# Patient Record
Sex: MALE | Race: BLACK OR AFRICAN AMERICAN | NOT HISPANIC OR LATINO | Employment: UNEMPLOYED | URBAN - METROPOLITAN AREA
[De-identification: names, ages, dates, MRNs, and addresses within clinical notes are randomized per-mention and may not be internally consistent; named-entity substitution may affect disease eponyms.]

---

## 2017-02-08 ENCOUNTER — HOSPITAL ENCOUNTER (OUTPATIENT)
Facility: OTHER | Age: 52
Discharge: HOME | End: 2017-02-08
Attending: INTERNAL MEDICINE | Admitting: INTERNAL MEDICINE
Payer: MEDICAID

## 2017-02-08 ENCOUNTER — SURGERY (OUTPATIENT)
Age: 52
End: 2017-02-08

## 2017-02-08 VITALS
HEART RATE: 71 BPM | OXYGEN SATURATION: 100 % | DIASTOLIC BLOOD PRESSURE: 107 MMHG | HEIGHT: 66 IN | SYSTOLIC BLOOD PRESSURE: 189 MMHG | TEMPERATURE: 98 F | RESPIRATION RATE: 20 BRPM | BODY MASS INDEX: 23.3 KG/M2 | WEIGHT: 145 LBS

## 2017-02-08 DIAGNOSIS — T82.49XA COMPLICATIONS, DIALYSIS, CATHETER, MECHANICAL, INITIAL ENCOUNTER: ICD-10-CM

## 2017-02-08 DIAGNOSIS — T82.49XA: ICD-10-CM

## 2017-02-08 PROCEDURE — 25000003 PHARM REV CODE 250

## 2017-02-08 PROCEDURE — C1752 CATH,HEMODIALYSIS,SHORT-TERM: HCPCS | Performed by: INTERNAL MEDICINE

## 2017-02-08 PROCEDURE — C1769 GUIDE WIRE: HCPCS | Performed by: INTERNAL MEDICINE

## 2017-02-08 PROCEDURE — 36581 REPLACE TUNNELED CV CATH: CPT | Performed by: INTERNAL MEDICINE

## 2017-02-08 PROCEDURE — 25000003 PHARM REV CODE 250: Performed by: INTERNAL MEDICINE

## 2017-02-08 PROCEDURE — 25500020 PHARM REV CODE 255: Performed by: INTERNAL MEDICINE

## 2017-02-08 PROCEDURE — 77001 FLUOROGUIDE FOR VEIN DEVICE: CPT | Performed by: INTERNAL MEDICINE

## 2017-02-08 PROCEDURE — 25500020 PHARM REV CODE 255

## 2017-02-08 RX ORDER — HEPARIN SODIUM 1000 [USP'U]/ML
INJECTION, SOLUTION INTRAVENOUS; SUBCUTANEOUS
Status: DISCONTINUED | OUTPATIENT
Start: 2017-02-08 | End: 2017-02-08 | Stop reason: HOSPADM

## 2017-02-08 RX ORDER — HEPARIN SOD,PORCINE/0.9 % NACL 1000/500ML
INTRAVENOUS SOLUTION INTRAVENOUS
Status: DISCONTINUED | OUTPATIENT
Start: 2017-02-08 | End: 2017-02-08 | Stop reason: HOSPADM

## 2017-02-08 RX ORDER — LIDOCAINE HYDROCHLORIDE 10 MG/ML
INJECTION INFILTRATION; PERINEURAL
Status: DISCONTINUED | OUTPATIENT
Start: 2017-02-08 | End: 2017-02-08 | Stop reason: HOSPADM

## 2017-02-08 RX ADMIN — HEPARIN SODIUM 4000 UNITS: 1000 INJECTION, SOLUTION INTRAVENOUS; SUBCUTANEOUS at 10:02

## 2017-02-08 RX ADMIN — IOHEXOL 4 ML: 300 INJECTION, SOLUTION INTRAVENOUS at 10:02

## 2017-02-08 RX ADMIN — LIDOCAINE HYDROCHLORIDE 10 ML: 10 INJECTION, SOLUTION INFILTRATION; PERINEURAL at 10:02

## 2017-02-08 RX ADMIN — HEPARIN SODIUM IN SODIUM CHLORIDE 500 ML: 200 INJECTION INTRAVENOUS at 09:02

## 2017-02-08 NOTE — BRIEF OP NOTE
[facility]  Brief Operative Note     SUMMARY     Surgery Date: 2/8/2017     Surgeon(s) and Role:     * Ady Chung MD - Primary    Assisting Surgeon: None    Pre-op Diagnosis:  ESRD (end stage renal disease) on dialysis [N18.6, Z99.2]  Mechanical complication of arteriovenous fistula surgically created [T82.590A]    Post-op Diagnosis:  Post-Op Diagnosis Codes:     * ESRD (end stage renal disease) on dialysis [N18.6, Z99.2]     * Mechanical complication of arteriovenous fistula surgically created [T82.590A]    Procedure(s) (LRB):  PERMCATH REWIRE- TUNNELED CATH REWIRE (Right)  ULTRASOUND (N/A)    Anesthesia: Local    Description of the findings of the procedure: TDC rewire    Findings/Key Components: patent SVC; new 23cm palindrome placed with tip right atrium    Estimated Blood Loss: * No values recorded between 2/8/2017 12:00 AM and 2/8/2017 10:39 AM *         Specimens:   Specimen     None          Discharge Note    SUMMARY     Admit Date: 2/8/2017    Discharge Date and Time:  02/08/2017 10:44 AM    Hospital Course (synopsis of major diagnoses, care, treatment, and services provided during the course of the hospital stay): patient admitted for TDC rewire; procedure successful no complications     Final Diagnosis: Post-Op Diagnosis Codes:     * ESRD (end stage renal disease) on dialysis [N18.6, Z99.2]     * Mechanical complication of arteriovenous fistula surgically created [T82.590A]    Disposition: Home or Self Care    Follow Up/Patient Instructions:     Medications:  Reconciled Home Medications:   Current Discharge Medication List      CONTINUE these medications which have NOT CHANGED    Details   amlodipine (NORVASC) 10 MG tablet Take 1 tablet (10 mg total) by mouth once daily.  Qty: 30 tablet, Refills: 0      levetiracetam (KEPPRA) 250 MG Tab Take 2 tablets (500 mg total) by mouth 2 (two) times daily.  Qty: 60 tablet, Refills: 0      sevelamer carbonate (RENVELA) 800 mg Tab Take 2  tablets (1,600 mg total) by mouth 3 (three) times daily with meals.  Qty: 90 tablet, Refills: 0             Discharge Procedure Orders  Activity as tolerated       Follow-up Information     Follow up with LOREN LINDER In 1 day.    Why:  As needed    Contact information:    Benny Lindo [address] [address]  [phone]        Follow up with Nydia Linder.    Specialty:  Dialysis Center    Contact information:    Benny LINDO [facility]  [phone]            Resume previous renal diet

## 2017-02-08 NOTE — H&P
[facility]  History & Physical    Subjective:      Chief Complaint/Reason for Admission: TDC exchange    Greyson Hudson III is a 51 y.o. male with ESRD who presents for above.  He states his current TDC was placed about 1 year ago in [place]; recently unable to achive sufficient BFR.  He has a right upper arm AVF that was placed about that time, but has never been used.  His dialysis facility is requesting ultrasound of AVF as well.    Past Medical History   Diagnosis Date    Hypertension     Kidney failure     Seizure disorder 2016     Past Surgical History   Procedure Laterality Date    Fistula creation Right 01/2016     History reviewed. No pertinent family history.  Social History   Substance Use Topics    Smoking status: Never Smoker    Smokeless tobacco: None    Alcohol use None       PTA Medications   Medication Sig    amlodipine (NORVASC) 10 MG tablet Take 1 tablet (10 mg total) by mouth once daily.    levetiracetam (KEPPRA) 250 MG Tab Take 2 tablets (500 mg total) by mouth 2 (two) times daily.    sevelamer carbonate (RENVELA) 800 mg Tab Take 2 tablets (1,600 mg total) by mouth 3 (three) times daily with meals.     Review of patient's allergies indicates:   Allergen Reactions    Pcn [penicillins] Itching        Review of Systems   Constitutional: Negative.    Respiratory: Negative.    Cardiovascular: Negative.    All other systems reviewed and are negative.      Objective:      Vital Signs (Most Recent)  Temp: 98.2 °F (36.8 °C) (02/08/17 0947)  Pulse: 69 (02/08/17 0947)  Resp: 18 (02/08/17 0947)  BP: (!) 201/98 (02/08/17 0947)  SpO2: 100 % (02/08/17 0947)    Vital Signs Range (Last 24H):  Temp:  [98.2 °F (36.8 °C)]   Pulse:  [69]   Resp:  [18]   BP: (201)/(98)   SpO2:  [100 %]     Physical Exam   Constitutional: He appears well-developed and well-nourished. No distress.   HENT:   Head: Normocephalic and atraumatic.   Cardiovascular: Normal rate and regular rhythm.     Pulmonary/Chest: No respiratory distress.   Musculoskeletal:   Right upper arm brachiocephalic AVF appears well developed; soft thrill   Neurological: He is alert.   Skin: He is not diaphoretic.           Assessment:    Poorly functioning TDC    Plan:    TDC rewire; Ultrasound to assess maturity of right upper extremity AVF

## 2017-02-08 NOTE — PROCEDURES
[facility] Interventional Nephrology Service    Date Of Procedure: 2/8/17    Procedure: Tunneled Dialysis Central Catheter Exchange    Indication: poor bfr via TDC    Primary Surgeon: Ady Chung MD  Assist: none    Referring Provider: Dr. Bazan    Blood Loss: 10cc  Contrast Used: 10cc    Procedure in Detail:  Patient prepped and draped in usual sterile fashion.  2% lidocaine with epinephrine used for local sedation to anesthetize soft tissues.  Blunt hemostat was used to dissect exit site and fibrin sheath around catheter and its cuff.  Catheter location right IJ.  Once the catheter and the cuff were freed, a guidewire was inserted through distal port into the central venous circulation (CVC).  This was done under fluoroscopic guidance.  Once wire was positioned to inferior vena cava, the old catheter was pulled while maintaining hemostasis as well as position of the wire in the CVC under fluoroscopy.  Before pulling catheter, 10cc of contrast was injected to evaluate for a fibrin sheath.  No sheath was noted. A new 23cm catheter was then placed over the guidewire and confirmed in correct positioning under fluoroscopy.  Both ports flushed well.  The catheter was packed with 1000 units/mL heparin.  Suture was used to secure the catheter at the exit site and to anchor to patient's skin.      A total of 3 sutures were placed    Patient tolerated the procedure well and there were no complications      Ultrasound of AVF:  - patent arterial anastamosis  - mid AVF approximately 8mm diameter; one large accessory vein noted  - AVF outflow with narrowing to approximately 5mm, but no pulsatility or critical stenosis  - BFV approximately 1250 ml/min      Impression/Plan:  - Successful rewire of TDC.  In addition to this, AVF appears mature for cannulation and recommend attempt at cannulation.  If unable to successfully cannulate for dialysis send for fistulogram.

## 2017-02-08 NOTE — IP AVS SNAPSHOT
[facility] Location ([place])  [address] [place] [address]  Phone: [phone]           Patient Discharge Instructions     Our goal is to set you up for success. This packet includes information on your condition, medications, and your home care. It will help you to care for yourself so you don't get sicker and need to go back to the hospital.     Please ask your nurse if you have any questions.        There are many details to remember when preparing to leave the hospital. Here is what you will need to do:    1. Take your medicine. If you are prescribed medications, review your Medication List in the following pages. You may have new medications to  at the pharmacy and others that you'll need to stop taking. Review the instructions for how and when to take your medications. Talk with your doctor or nurses if you are unsure of what to do.     2. Go to your follow-up appointments. Specific follow-up information is listed in the following pages. Your may be contacted by a transition nurse or clinical provider about future appointments. Be sure we have all of the phone numbers to reach you, if needed. Please contact your provider's office if you are unable to make an appointment.     3. Watch for warning signs. Your doctor or nurse will give you detailed warning signs to watch for and when to call for assistance. These instructions may also include educational information about your condition. If you experience any of warning signs to your health, call your doctor.               ** Verify the list of medication(s) below is accurate and up to date. Carry this with you in case of emergency. If your medications have changed, please notify your healthcare provider.             Medication List      ASK your doctor about these medications        Additional Info                      amlodipine 10 MG tablet   Commonly known as:  NORVASC   Quantity:  30 tablet   Refills:  0   Dose:  10 mg     "Instructions:  Take 1 tablet (10 mg total) by mouth once daily.     Begin Date    AM    Noon    PM    Bedtime       levetiracetam 250 MG Tab   Commonly known as:  KEPPRA   Quantity:  60 tablet   Refills:  0   Dose:  500 mg    Instructions:  Take 2 tablets (500 mg total) by mouth 2 (two) times daily.     Begin Date    AM    Noon    PM    Bedtime       sevelamer carbonate 800 mg Tab   Commonly known as:  RENVELA   Quantity:  90 tablet   Refills:  0   Dose:  1600 mg    Instructions:  Take 2 tablets (1,600 mg total) by mouth 3 (three) times daily with meals.     Begin Date    AM    Noon    PM    Bedtime                  Please bring to all follow up appointments:    1. A copy of your discharge instructions.  2. All medicines you are currently taking in their original bottles.  3. Identification and insurance card.    Please arrive 15 minutes ahead of scheduled appointment time.    Please call 24 hours in advance if you must reschedule your appointment and/or time.        Follow-up Information     Follow up with [facility] In 1 day.    Why:  As needed    Contact information:    [address] [place] [address]  [phone]        Discharge References/Attachments     CENTRAL VEIN ACCESS, CARING FOR YOUR (ENGLISH)    CENTRAL LINE (CENTRAL VENOUS ACCESS DEVICE) (ENGLISH)        Admission Information     Date & Time Provider Department CSN    2/8/2017  9:01 AM Ady Chung MD [facility] [id number]      Care Providers     Provider Role Specialty Primary office phone    Ady Chung MD Attending Provider Nephrology [phone]    Ady Chung MD Surgeon  Nephrology [phone]      Your Vitals Were     BP Pulse Temp Resp Height Weight    178/101 71 98.2 °F (36.8 °C) (Oral) 18 5' 6" (1.676 m) 65.8 kg (145 lb)    SpO2 BMI             100% 23.4 kg/m2         Recent Lab Values     No lab values to display.      Allergies as of 2/8/2017        Reactions    Pcn " [Penicillins] Itching      [facility]     [facility] Nurse Care Line - 24/7 Assistance  Unless otherwise directed by your provider, please contact [facility], our nurse care line that is available for 24/7 assistance.     Registered nurses in the [facility] provide clinical advisement, health education, appointment booking, and other advisory services.  Call for this free service at [phone].        Advance Directives     An advance directive is a document which, in the event you are no longer able to make decisions for yourself, tells your healthcare team what kind of treatment you do or do not want to receive, or who you would like to make those decisions for you.  If you do not currently have an advance directive, [facility] encourages you to create one.  For more information call:  [phone]), [phone] ([phone]),  or log on to [email or web address].        Language Assistance Services     ATTENTION: Language assistance services are available, free of charge. Please call [phone].      ATENCIÓN: Si habla español, tiene a lloyd disposición servicios gratuitos de asistencia lingüística. Llame al [phone].     CHÚ Ý: N?u b?n nói Ti?ng Vi?t, có các d?ch v? h? tr? ngôn ng? mi?n phí dành cho b?n. G?i s? [phone].        Chronic Kindey Disease Education             [facility] Sign-Up     Activating your [facility] account is as easy as 1-2-3!     1) Visit [email or web address], select Sign Up Now, enter this activation code and your date of birth, then select Next.  [email or web address]  Expires: 3/25/2017 10:39 AM      2) Create a username and password to use when you visit [facility] in the future and select a security question in case you lose your password and select Next.    3) Enter your e-mail address and click Sign Up!    Additional Information  If you have questions, please e-mail [email or web address] or call [phone] to talk to our [facility]  staff. Remember, [facility] is NOT to be used for urgent needs. For medical emergencies, dial 911.          [facility] complies with applicable Federal civil rights laws and does not discriminate on the basis of race, color, national origin, age, disability, or sex.

## 2017-02-11 ENCOUNTER — HOSPITAL ENCOUNTER (EMERGENCY)
Facility: OTHER | Age: 52
Discharge: HOME | End: 2017-02-11
Attending: EMERGENCY MEDICINE
Payer: MEDICAID

## 2017-02-11 VITALS
BODY MASS INDEX: 23.3 KG/M2 | HEIGHT: 66 IN | HEART RATE: 86 BPM | TEMPERATURE: 99 F | OXYGEN SATURATION: 100 % | DIASTOLIC BLOOD PRESSURE: 89 MMHG | WEIGHT: 145 LBS | SYSTOLIC BLOOD PRESSURE: 165 MMHG | RESPIRATION RATE: 14 BRPM

## 2017-02-11 DIAGNOSIS — T82.49XA COMPLICATIONS, DIALYSIS, CATHETER, MECHANICAL, INITIAL ENCOUNTER: Primary | ICD-10-CM

## 2017-02-11 LAB
ANION GAP SERPL CALC-SCNC: 10 MMOL/L
BUN SERPL-MCNC: 61 MG/DL
CALCIUM SERPL-MCNC: 8.5 MG/DL
CHLORIDE SERPL-SCNC: 107 MMOL/L
CO2 SERPL-SCNC: 22 MMOL/L
CREAT SERPL-MCNC: 13.2 MG/DL
EST. GFR  (AFRICAN AMERICAN): 4 ML/MIN/1.73 M^2
EST. GFR  (NON AFRICAN AMERICAN): 4 ML/MIN/1.73 M^2
GLUCOSE SERPL-MCNC: 93 MG/DL
POTASSIUM SERPL-SCNC: 4.1 MMOL/L
SODIUM SERPL-SCNC: 139 MMOL/L

## 2017-02-11 PROCEDURE — 80048 BASIC METABOLIC PNL TOTAL CA: CPT

## 2017-02-11 PROCEDURE — 99285 EMERGENCY DEPT VISIT HI MDM: CPT

## 2017-02-11 RX ORDER — HYDROCODONE BITARTRATE AND ACETAMINOPHEN 5; 325 MG/1; MG/1
1 TABLET ORAL EVERY 4 HOURS PRN
Qty: 4 TABLET | Refills: 0 | Status: ON HOLD | OUTPATIENT
Start: 2017-02-11 | End: 2018-11-01 | Stop reason: HOSPADM

## 2017-02-11 NOTE — ED AVS SNAPSHOT
[facility]  [address]  [facility] [address]               Greyson Hudson III   2017  5:12 PM   ED    Description:  Male : 1965   Department:  [facility]           Your Care was Coordinated By:     Provider Role From To    Nubia Pichardo MD Attending Provider [phone] --      Reason for Visit     Dialysis Port Problem           Diagnoses this Visit        Comments    Complications, dialysis, catheter, mechanical, initial encounter    -  Primary       ED Disposition     None           To Do List           Follow-up Information     Follow up with Primary Care Physician  .       These Medications        Disp Refills Start End    hydrocodone-acetaminophen 5-325mg (NORCO) 5-325 mg per tablet 4 tablet 0 2017     Take 1 tablet by mouth every 4 (four) hours as needed for Pain. - Oral    Pharmacy: [facility] [address] [place] [address] Ph #: [phone]         [facility]     [facility] Care [facility] -  Assistance  Registered nurses in the [facility] provide clinical advisement, health education, appointment booking, and other advisory services.  Call for this free service at [phone].             Medications           Message regarding Medications     Verify the changes and/or additions to your medication regime listed below are the same as discussed with your clinician today.  If any of these changes or additions are incorrect, please notify your healthcare provider.        START taking these NEW medications        Refills    hydrocodone-acetaminophen 5-325mg (NORCO) 5-325 mg per tablet 0    Sig: Take 1 tablet by mouth every 4 (four) hours as needed for Pain.    Class: Print    Route: Oral           Verify that the below list of medications is an accurate representation of the medications you are currently taking.  If none reported, the  "list may be blank. If incorrect, please contact your healthcare provider. Carry this list with you in case of emergency.           Current Medications     amlodipine (NORVASC) 10 MG tablet Take 1 tablet (10 mg total) by mouth once daily.    levetiracetam (KEPPRA) 250 MG Tab Take 2 tablets (500 mg total) by mouth 2 (two) times daily.    sevelamer carbonate (RENVELA) 800 mg Tab Take 2 tablets (1,600 mg total) by mouth 3 (three) times daily with meals.    hydrocodone-acetaminophen 5-325mg (NORCO) 5-325 mg per tablet Take 1 tablet by mouth every 4 (four) hours as needed for Pain.           Clinical Reference Information           Your Vitals Were     BP Pulse Temp Resp Height Weight    185/103 (BP Location: Left arm, Patient Position: Sitting, BP Method: Automatic) 85 98.8 °F (37.1 °C) (Oral) 14 5' 6" (1.676 m) 65.8 kg (145 lb)    SpO2 BMI             100% 23.4 kg/m2         Allergies as of 2/11/2017        Reactions    Pcn [Penicillins] Itching      Immunizations Administered on Date of Encounter - 2/11/2017     None      ED Micro, Lab, POCT     Start Ordered       Status Ordering Provider    02/11/17 1749 02/11/17 1748  Basic metabolic panel  STAT      Final result       ED Imaging Orders     Start Ordered       Status Ordering Provider    02/11/17 1749 02/11/17 1748  X-Ray Chest 1 View  1 time imaging      Final result         Discharge Instructions       We have prescribed you pain medication. Please fill and take as directed. Do not drink alcohol or drive while taking this medication.  Do not take any additional tylenol while taking this medication.  It is important to take stool softeners and/or eat plenty of fiber to prevent constipation while taking this medication.      Please return to the ER if you have chest pain, difficulty breathing, fevers, altered mental status, dizziness, weakness, or any other concerns.      Follow up with your primary care physician.        Discharge References/Attachments     CENTRAL " LINE (CENTRAL VENOUS ACCESS DEVICE) (ENGLISH)      [facility] Sign-Up     Activating your [facility] account is as easy as 1-2-3!     1) Visit [email or web address], select Sign Up Now, enter this activation code and your date of birth, then select Next.  [email or web address]  Expires: 3/25/2017 10:39 AM      2) Create a username and password to use when you visit [facility] in the future and select a security question in case you lose your password and select Next.    3) Enter your e-mail address and click Sign Up!    Additional Information  If you have questions, please e-mail [email or web address] or call [phone] to talk to our [facility] staff. Remember, [facility] is NOT to be used for urgent needs. For medical emergencies, dial 911.          [facility] complies with applicable Federal civil rights laws and does not discriminate on the basis of race, color, national origin, age, disability, or sex.        Language Assistance Services     ATTENTION: Language assistance services are available, free of charge. Please call [phone].      ATENCIÓN: Si habla español, tiene a lloyd disposición servicios gratuitos de asistencia lingüística. Llame al [phone].     CHÚ Ý: N?u b?n nói Ti?ng Vi?t, có các d?ch v? h? tr? ngôn ng? mi?n phí dành cho b?n. G?i s? [phone].

## 2017-02-11 NOTE — ED PROVIDER NOTES
Encounter Date: 2/11/2017    SCRIBE #1 NOTE: I, Tracy Early, am scribing for, and in the presence of,  Dr. Brooks I have scribed the entire note.       History     Chief Complaint   Patient presents with    Dialysis Port Problem     pt to ED with complaint of pain to port sight onset at dialysis.  Had port placed to right chest last week started using today approx 1 1/2 hours in started having pain     Review of patient's allergies indicates:   Allergen Reactions    Pcn [penicillins] Itching     HPI Comments: Time seen by provider: 5:44 PM    This is a 51 y.o. male who presents with complaint of dialysis port pain. He reports onset of symptoms was just prior to arrival in the ED. The patient states he recently had a new catheter placed 3 days ago to the right chest. He reports he also has a fistula to the right arm. The patient is uncertain which site was accessed for dialysis. Today was first use of the new port. He reports he was only able to have about 1.5 hr of dialysis prior to being sent to the ED for evaluation of chest discomfort. The patient denies any chest pain, SOB, palpitations, nausea, vomiting, fever or chills.     The history is provided by the patient.     Past Medical History   Diagnosis Date    Hypertension     Kidney failure     Seizure disorder 2016     No past medical history pertinent negatives.  Past Surgical History   Procedure Laterality Date    Fistula creation Right 01/2016     History reviewed. No pertinent family history.  Social History   Substance Use Topics    Smoking status: Never Smoker    Smokeless tobacco: None    Alcohol use None     Review of Systems   Constitutional: Negative for chills and fever.   Respiratory: Negative for cough and shortness of breath.    Cardiovascular: Negative for chest pain and palpitations.   Gastrointestinal: Negative for nausea and vomiting.   Skin: Negative for rash and wound.        Dialysis port site tenderness   Neurological: Negative  for dizziness and headaches.       Physical Exam   Initial Vitals   BP Pulse Resp Temp SpO2   02/11/17 1721 02/11/17 1721 02/11/17 1721 02/11/17 1721 02/11/17 1721   185/103 85 14 98.8 °F (37.1 °C) 100 %     Physical Exam    Nursing note and vitals reviewed.  Constitutional: He appears well-developed and well-nourished. He is not diaphoretic. No distress.   HENT:   Head: Normocephalic and atraumatic.   Right Ear: External ear normal.   Left Ear: External ear normal.   Eyes: Conjunctivae and EOM are normal.   Neck: Normal range of motion. Neck supple.   Cardiovascular: Normal rate, regular rhythm and normal heart sounds.   Pulmonary/Chest: He has no wheezes. He has no rhonchi. He has no rales.   Right upper chest catheter that is clean dry and intact.  No surrounding erythema.     Musculoskeletal: Normal range of motion. He exhibits edema. He exhibits no tenderness.   Right upper extremity AV fistula with palpable thrill. 1+ pitting edema BLE.    Lymphadenopathy:     He has no cervical adenopathy.   Neurological: He is alert and oriented to person, place, and time. He has normal strength.   Skin: Skin is warm and dry. No rash noted.         ED Course   Procedures  Labs Reviewed   BASIC METABOLIC PANEL - Abnormal; Notable for the following:        Result Value    CO2 22 (*)     BUN, Bld 61 (*)     Creatinine 13.2 (*)     Calcium 8.5 (*)     eGFR if  4 (*)     eGFR if non  4 (*)     All other components within normal limits        Imaging Results         X-Ray Chest 1 View (Final result) Result time:  02/11/17 18:06:51    Final result by Flower Rosen MD (02/11/17 18:06:51)    Impression:        As above.      Electronically signed by: FLOWER ROSEN MD, MD  Date:     02/11/17  Time:    18:06     Narrative:    COMPARISON: Chest radiograph 10/30/16    FINDINGS: AP portable upright view of the chest. Monitoring leads overlie the chest. The cardiomediastinal silhouette is stable without  evidence of failure.  Right IJ CVC tip appears somewhat lower in position projected over the right atrium.  No new focal opacity. No large pneumothorax or pleural effusion. Osseous structures appear intact.              X-Rays:   Independently Interpreted Readings:   Chest X-Ray: Portable Chest X-ray Reading (6:04 PM): Right catheter in place. Large heart. No effusion, infiltrate or edema.       Medical Decision Making:   History:   Old Medical Records: I decided to obtain old medical records.  Initial Assessment:   5:44PM:  Pt is a 52 y/o M who presents to ED for evaluation of the R upper chest catheter. Pt appears well, nontoxic.  His AV fistula seems patent.  He had a previous catheter but was replaced over a wire earlier this week.  Will plan for xray, labs, will continue to follow and reassess.    Independently Interpreted Test(s):   I have ordered and independently interpreted X-rays - see prior notes.  Clinical Tests:   Lab Tests: Ordered and Reviewed  Radiological Study: Ordered and Reviewed  Other:   I have discussed this case with another health care provider.    Additional MDM:   X-Rays: I have independently interpreted X-Ray(s) - see notes.     5:48 PM Called [facility] dialysis center    5:52 PM Staff at the dialysis center noted that both AV fistula and cath were access today. Staff report the patient typically has a dry weight of 61 kg and was 61.6 kg today. 0. 2 kg was removed today during 1.5 hrs. Patient was supposed to have a 4 hr session    7:45 PM Pt's potassium is stable.  CXR does not indicate significant fluid overload.  Will plan to page Dr. Chung of [facility]    7:47 PM Case discussed with Dr. Chung, states the patient can have expected pain at the site after catheter change and can be discharged home to follow up in clinic    7:55PM:  I updated pt regarding results and my conversation with Dr. Chung.  I have discussed with the pt ED return warnings and need for close PCP f/u.  Pt agreeable  to plan and all questions answered.  I feel that pt is stable for discharge and management as an outpatient and no further intervention is needed at this time.  Pt is comfortable returning to the ED if needed.  Will DC home in stable condition.             Scribe Attestation:   Scribe #1: I performed the above scribed service and the documentation accurately describes the services I performed. I attest to the accuracy of the note.    Attending Attestation:           Physician Attestation for Scribe:  Physician Attestation Statement for Scribe #1: I, Dr. Pichardo, reviewed documentation, as scribed by Tracy Early in my presence, and it is both accurate and complete.                 ED Course     Clinical Impression:     1. Complications, dialysis, catheter, mechanical, initial encounter                Nubia Pichardo MD  02/11/17 6444

## 2017-02-12 NOTE — ED NOTES
Patient moved to ED room 1 via EMS, patient assisted onto stretcher and changed into a gown. Patient placed on cardiac monitor, continuous pulse oximetry and automatic blood pressure cuff. Bed placed in low locked position, side rails up x 2, call light is within reach of patient or family, orientation to room and explanation of wait provided to family and patient, alarms set and turned on for monitor and pulse ox, awaiting MD evaluation and orders, will continue to monitor.

## 2017-02-12 NOTE — DISCHARGE INSTRUCTIONS
We have prescribed you pain medication. Please fill and take as directed. Do not drink alcohol or drive while taking this medication.  Do not take any additional tylenol while taking this medication.  It is important to take stool softeners and/or eat plenty of fiber to prevent constipation while taking this medication.      Please return to the ER if you have chest pain, difficulty breathing, fevers, altered mental status, dizziness, weakness, or any other concerns.      Follow up with your primary care physician.

## 2017-02-12 NOTE — ED TRIAGE NOTES
"Pt presents to ER w/ reports of new onset of pain to dialysis access to right chest wall. Pt reports access placed on Thursday and started hurting while at dialysis today. Pt states," Its like a stabbing pain every now and then but it just feels really uncomfortable". Pt denies chest pain, SOB, drainage or bleeding to site.   "

## 2017-02-12 NOTE — ED NOTES
Attempted x 1 for blood work pt states he does not want blood work. Dr. Pichardo aware. Pt aware need for blood work, Phlebotomy called for collection.

## 2017-02-12 NOTE — ED NOTES
Patient identifiers verified and correct for Greyson Tristan III.  Pt denies chest pain or SOB at this time.   LOC: The patient is awake, alert and aware of environment with an appropriate affect, the patient is oriented x 3 and speaking appropriately.  APPEARANCE: Patient resting comfortably and in no acute distress, patient is clean and well groomed, patient's clothing is properly fastened.  SKIN: The skin is warm and dry, color consistent with ethnicity, patient has normal skin turgor and moist mucus membranes, skin intact, no breakdown or bruising noted.  MUSCULOSKELETAL: Patient moving all extremities spontaneously, no obvious swelling or deformities noted.  RESPIRATORY: Airway is open and patent, respirations are spontaneous, patient has a normal effort and rate, no accessory muscle use noted, bilateral breath sounds clear.  CARDIAC: Patient has a normal rate and regular rhythm, no periphreal edema noted, capillary refill < 3 seconds. Dialysis access noted to RUE + thrill, right chest wall dialysis access noted w/ bandage applied no drainage or bleeding noted to site.   ABDOMEN: Soft and non tender to palpation, no distention noted.  NEUROLOGIC: PERRL, 3 mm bilaterally, eyes open spontaneously, behavior appropriate to situation, follows commands, facial expression symmetrical, bilateral hand grasp equal and even, purposeful motor response noted, normal sensation in all extremities when touched with a finger.    FALL RISK BAND, ALLERGY BAND AND LIMB ALERT APPLIED TO RUE.

## 2017-02-21 ENCOUNTER — HOSPITAL ENCOUNTER (EMERGENCY)
Facility: OTHER | Age: 52
Discharge: HOME | End: 2017-02-22
Attending: EMERGENCY MEDICINE
Payer: MEDICAID

## 2017-02-21 DIAGNOSIS — R05.9 COUGH: ICD-10-CM

## 2017-02-21 DIAGNOSIS — M79.10 MYALGIA: Primary | ICD-10-CM

## 2017-02-21 PROCEDURE — 93005 ELECTROCARDIOGRAM TRACING: CPT

## 2017-02-21 PROCEDURE — 96374 THER/PROPH/DIAG INJ IV PUSH: CPT

## 2017-02-21 PROCEDURE — 96375 TX/PRO/DX INJ NEW DRUG ADDON: CPT

## 2017-02-21 PROCEDURE — 99284 EMERGENCY DEPT VISIT MOD MDM: CPT | Mod: 25

## 2017-02-21 NOTE — ED AVS SNAPSHOT
[facility]  [address]  [facility] [address]               Greyson Hudson III   2017 11:24 PM   ED    Description:  Male : 1965   Department:  [facility]           Your Care was Coordinated By:     Provider Role From To    Nubia Pichardo MD Attending Provider 17 0137 --      Reason for Visit     Generalized Body Aches           Diagnoses this Visit        Comments    Myalgia    -  Primary     Cough           ED Disposition     None           To Do List           Follow-up Information     Follow up with Ady Chung MD.    Specialty:  Nephrology    Contact information:    [id number]  [facility] [address]  [phone]         These Medications        Disp Refills Start End    cephALEXin (KEFLEX) 500 MG capsule 10 capsule 0 2017    Take 1 capsule (500 mg total) by mouth every 12 (twelve) hours. - Oral    Pharmacy: [facility] [address] [place] [address] Ph #: [phone]         [facility]     [facility] -  Assistance  Registered nurses in the [facility] provide clinical advisement, health education, appointment booking, and other advisory services.  Call for this free service at [phone].             Medications           Message regarding Medications     Verify the changes and/or additions to your medication regime listed below are the same as discussed with your clinician today.  If any of these changes or additions are incorrect, please notify your healthcare provider.        START taking these NEW medications        Refills    cephALEXin (KEFLEX) 500 MG capsule 0    Sig: Take 1 capsule (500 mg total) by mouth every 12 (twelve) hours.    Class: Print    Route: Oral      These medications were administered today        Dose Freq    morphine injection 2 mg 2 mg ED 1 Time    Sig: Inject 1 mL (2  "mg total) into the vein ED 1 Time.    Class: Normal    Route: Intravenous    ondansetron injection 4 mg 4 mg ED 1 Time    Sig: Inject 4 mg into the vein ED 1 Time.    Class: Normal    Route: Intravenous           Verify that the below list of medications is an accurate representation of the medications you are currently taking.  If none reported, the list may be blank. If incorrect, please contact your healthcare provider. Carry this list with you in case of emergency.           Current Medications     amlodipine (NORVASC) 10 MG tablet Take 1 tablet (10 mg total) by mouth once daily.    hydrocodone-acetaminophen 5-325mg (NORCO) 5-325 mg per tablet Take 1 tablet by mouth every 4 (four) hours as needed for Pain.    levetiracetam (KEPPRA) 250 MG Tab Take 2 tablets (500 mg total) by mouth 2 (two) times daily.    sevelamer carbonate (RENVELA) 800 mg Tab Take 2 tablets (1,600 mg total) by mouth 3 (three) times daily with meals.    cephALEXin (KEFLEX) 500 MG capsule Take 1 capsule (500 mg total) by mouth every 12 (twelve) hours.           Clinical Reference Information           Your Vitals Were     BP Pulse Temp Resp Height Weight    161/89 94 99 °F (37.2 °C) (Oral) 0 5' 6" (1.676 m) 61.7 kg (136 lb)    SpO2 BMI             100% 21.95 kg/m2         Allergies as of 2/22/2017        Reactions    Pcn [Penicillins] Itching      Immunizations Administered on Date of Encounter - 2/22/2017     None      ED Micro, Lab, POCT     Start Ordered       Status Ordering Provider    02/22/17 0150 02/22/17 0150  Comprehensive metabolic panel  STAT      Final result     02/22/17 0150 02/22/17 0150  CBC auto differential  STAT      Preliminary result     02/22/17 0150 02/22/17 0150  Blood Culture #1 **CANNOT BE ORDERED STAT**  Once      In process     02/22/17 0150 02/22/17 0150  Influenza antigen Nasopharyngeal Swab  Once      Final result     02/22/17 0150 02/22/17 0150  Magnesium  STAT      Final result       ED Imaging Orders     Start " Ordered       Status Ordering Provider    02/22/17 0150 02/22/17 0150  X-Ray Chest 1 View  1 time imaging      Final result         Discharge Instructions       We have prescribed you antibiotics. Please fill and take as directed. It is important that you completely finish the antibiotics.      Please return to the ER if you have chest pain, difficulty breathing, fevers, altered mental status, dizziness, weakness, or any other concerns.      Follow up with your nephrologist.    Discharge References/Attachments     MYALGIAS (ENGLISH)      [facility] Sign-Up     Activating your [facility] account is as easy as 1-2-3!     1) Visit [email or web address], select Sign Up Now, enter this activation code and your date of birth, then select Next.  [email or web address]  Expires: 3/25/2017 10:39 AM      2) Create a username and password to use when you visit [facility] in the future and select a security question in case you lose your password and select Next.    3) Enter your e-mail address and click Sign Up!    Additional Information  If you have questions, please e-mail [email or web address] or call [phone] to talk to our [facility] staff. Remember, [facility] is NOT to be used for urgent needs. For medical emergencies, dial 911.          [facility] complies with applicable Federal civil rights laws and does not discriminate on the basis of race, color, national origin, age, disability, or sex.        Language Assistance Services     ATTENTION: Language assistance services are available, free of charge. Please call [phone].      ATENCIÓN: Si habla español, tiene a lloyd disposición servicios gratuitos de asistencia lingüística. Llame al [phone].     CHÚ Ý: N?u b?n nói Ti?ng Vi?t, có các d?ch v? h? tr? ngôn ng? mi?n phí dành cho b?n. G?i s? [phone].

## 2017-02-22 VITALS
TEMPERATURE: 99 F | RESPIRATION RATE: 16 BRPM | WEIGHT: 136 LBS | BODY MASS INDEX: 21.86 KG/M2 | HEIGHT: 66 IN | HEART RATE: 94 BPM | SYSTOLIC BLOOD PRESSURE: 161 MMHG | DIASTOLIC BLOOD PRESSURE: 89 MMHG | OXYGEN SATURATION: 100 %

## 2017-02-22 LAB
ALBUMIN SERPL BCP-MCNC: 2.8 G/DL
ALP SERPL-CCNC: 61 U/L
ALT SERPL W/O P-5'-P-CCNC: 7 U/L
ANION GAP SERPL CALC-SCNC: 15 MMOL/L
AST SERPL-CCNC: 13 U/L
BASOPHILS # BLD AUTO: 0.01 K/UL
BASOPHILS NFR BLD: 0.2 %
BILIRUB SERPL-MCNC: 0.3 MG/DL
BUN SERPL-MCNC: 68 MG/DL
CALCIUM SERPL-MCNC: 8.2 MG/DL
CHLORIDE SERPL-SCNC: 108 MMOL/L
CO2 SERPL-SCNC: 18 MMOL/L
CREAT SERPL-MCNC: 13.7 MG/DL
DIFFERENTIAL METHOD: ABNORMAL
EOSINOPHIL # BLD AUTO: 0.2 K/UL
EOSINOPHIL NFR BLD: 3.9 %
ERYTHROCYTE [DISTWIDTH] IN BLOOD BY AUTOMATED COUNT: 15 %
EST. GFR  (AFRICAN AMERICAN): 4 ML/MIN/1.73 M^2
EST. GFR  (NON AFRICAN AMERICAN): 4 ML/MIN/1.73 M^2
FLUAV AG SPEC QL IA: NEGATIVE
FLUBV AG SPEC QL IA: NEGATIVE
GLUCOSE SERPL-MCNC: 90 MG/DL
HCT VFR BLD AUTO: 28.3 %
HGB BLD-MCNC: 9.4 G/DL
LYMPHOCYTES # BLD AUTO: 1.1 K/UL
LYMPHOCYTES NFR BLD: 22.1 %
MAGNESIUM SERPL-MCNC: 1.8 MG/DL
MCH RBC QN AUTO: 29.1 PG
MCHC RBC AUTO-ENTMCNC: 33.2 %
MCV RBC AUTO: 88 FL
MONOCYTES # BLD AUTO: 0.4 K/UL
MONOCYTES NFR BLD: 7.7 %
NEUTROPHILS # BLD AUTO: 3.4 K/UL
NEUTROPHILS NFR BLD: 66.1 %
PLATELET # BLD AUTO: 125 K/UL
PLATELET BLD QL SMEAR: ABNORMAL
PMV BLD AUTO: 11.1 FL
POTASSIUM SERPL-SCNC: 4.1 MMOL/L
PROT SERPL-MCNC: 9.1 G/DL
RBC # BLD AUTO: 3.23 M/UL
SODIUM SERPL-SCNC: 141 MMOL/L
SPECIMEN SOURCE: NORMAL
WBC # BLD AUTO: 5.17 K/UL

## 2017-02-22 PROCEDURE — 87077 CULTURE AEROBIC IDENTIFY: CPT

## 2017-02-22 PROCEDURE — 63600175 PHARM REV CODE 636 W HCPCS: Performed by: EMERGENCY MEDICINE

## 2017-02-22 PROCEDURE — 93010 ELECTROCARDIOGRAM REPORT: CPT | Mod: ,,, | Performed by: INTERNAL MEDICINE

## 2017-02-22 PROCEDURE — 83735 ASSAY OF MAGNESIUM: CPT

## 2017-02-22 PROCEDURE — 85025 COMPLETE CBC W/AUTO DIFF WBC: CPT

## 2017-02-22 PROCEDURE — 80053 COMPREHEN METABOLIC PANEL: CPT

## 2017-02-22 PROCEDURE — 87186 SC STD MICRODIL/AGAR DIL: CPT

## 2017-02-22 PROCEDURE — 87400 INFLUENZA A/B EACH AG IA: CPT | Mod: 59

## 2017-02-22 PROCEDURE — 87040 BLOOD CULTURE FOR BACTERIA: CPT

## 2017-02-22 RX ORDER — CEPHALEXIN 500 MG/1
500 CAPSULE ORAL EVERY 12 HOURS
Qty: 10 CAPSULE | Refills: 0 | Status: SHIPPED | OUTPATIENT
Start: 2017-02-22 | End: 2017-02-27

## 2017-02-22 RX ORDER — ONDANSETRON 2 MG/ML
4 INJECTION INTRAMUSCULAR; INTRAVENOUS
Status: COMPLETED | OUTPATIENT
Start: 2017-02-22 | End: 2017-02-22

## 2017-02-22 RX ORDER — MORPHINE SULFATE 2 MG/ML
2 INJECTION, SOLUTION INTRAMUSCULAR; INTRAVENOUS
Status: COMPLETED | OUTPATIENT
Start: 2017-02-22 | End: 2017-02-22

## 2017-02-22 RX ADMIN — MORPHINE SULFATE 2 MG: 2 INJECTION, SOLUTION INTRAMUSCULAR; INTRAVENOUS at 02:02

## 2017-02-22 RX ADMIN — ONDANSETRON 4 MG: 2 INJECTION, SOLUTION INTRAMUSCULAR; INTRAVENOUS at 02:02

## 2017-02-22 NOTE — ED NOTES
Rounding on the patient has been done. he has been updated on the plan of care and his current status. Pain was assessed and is currently a 8/10. Comfort positioning and restroom needs were addressed. Necessary items were placed with in his reach and he was advised when a reassessment would take place. The call bell remains at the bedside for any additional patient needs. The patient is resting comfortably on the stretcher, respirations are even and unlabored, skin warm and dry. Will continue to monitor.

## 2017-02-22 NOTE — DISCHARGE INSTRUCTIONS
We have prescribed you antibiotics. Please fill and take as directed. It is important that you completely finish the antibiotics.      Please return to the ER if you have chest pain, difficulty breathing, fevers, altered mental status, dizziness, weakness, or any other concerns.      Follow up with your nephrologist.

## 2017-02-22 NOTE — ED NOTES
Pt c/o body ache starting during dialysis yesterday afternoon @ 1400 hrs. Pt states he did not get to complete his dialysis. Pt has a small area of infection to the suture site of the Argueta cath w/ small amt of pus, redness and inflammation noted. Pt is A & O x 3, denies SOB, fever, chills and N/V/D. Skin is warm and dry w/ pink mucosa. VSS. EAMON x 3mm. BBS- CTA. Abd- SNT. PSM x 4 exts. Bed is locked and in the low position w/ the side rails up and locked for safety. Call bell @ BS. Will continue to monitor closely.

## 2017-02-22 NOTE — ED PROVIDER NOTES
Encounter Date: 2/21/2017    SCRIBE #1 NOTE: I, Tori Crocker , am scribing for, and in the presence of, Dr. rBooks       History     Chief Complaint   Patient presents with    Generalized Body Aches     with chills, and cough since having dialysis today.     Review of patient's allergies indicates:   Allergen Reactions    Pcn [penicillins] Itching     HPI Comments: Time seen by provider: 1:45 AM    This is a 51 y.o. male who presents with complaint of generalized body aches. Symptoms began today.  Pt reports chills, congestion, nausea, vomiting, cough, and headache, but denies fever, fatigue, rhinorrhea, sneezing, SOB, blood in vomitus, abdominal pain, or difficulty urinating. Symptoms are described as constant. Pt denies any alleviating factors, and notes symptoms began after undergoing half of his dialysis session (dialyzed every Tuesday, Thursday, and Saturday) and therefore he stopped his session prematurely.  He has an AV fistula and chest wall catheter that are both being used.  His last full session of dialysis was Saturday. Pt does still urinate.      The history is provided by the patient.     Past Medical History   Diagnosis Date    Hypertension     Kidney failure     Seizure disorder 2016     No past medical history pertinent negatives.  Past Surgical History   Procedure Laterality Date    Fistula creation Right 01/2016     No family history on file.  Social History   Substance Use Topics    Smoking status: Never Smoker    Smokeless tobacco: Not on file    Alcohol use Not on file     Review of Systems   Constitutional: Positive for chills. Negative for fever.   HENT: Positive for congestion. Negative for rhinorrhea, sneezing and sore throat.    Eyes: Negative for redness and visual disturbance.   Respiratory: Positive for cough. Negative for chest tightness and shortness of breath.    Cardiovascular: Negative for chest pain and palpitations.   Gastrointestinal: Positive for nausea and vomiting.  Negative for abdominal pain and diarrhea.        Negative for blood in vomitus.   Genitourinary: Negative for difficulty urinating and dysuria.   Musculoskeletal: Positive for myalgias. Negative for back pain.   Skin: Negative for rash.   Neurological: Positive for headaches. Negative for dizziness and weakness.   Psychiatric/Behavioral: Negative for confusion.       Physical Exam   Initial Vitals   BP Pulse Resp Temp SpO2   02/21/17 2327 02/21/17 2327 02/21/17 2327 02/21/17 2327 02/21/17 2327   170/88 99 18 99 °F (37.2 °C) 100 %     Physical Exam    Nursing note and vitals reviewed.  Constitutional: He appears well-developed and well-nourished. He is not diaphoretic. No distress.   HENT:   Head: Normocephalic and atraumatic.   Right Ear: External ear normal.   Left Ear: External ear normal.   Eyes: EOM are normal. Pupils are equal, round, and reactive to light. Right eye exhibits no discharge. Left eye exhibits no discharge.   Neck: Normal range of motion. Neck supple.   Cardiovascular: Normal rate, regular rhythm and normal heart sounds.   Pulses:       Dorsalis pedis pulses are 2+ on the right side, and 2+ on the left side.   Pulmonary/Chest: Breath sounds normal. No respiratory distress. He has no wheezes. He has no rhonchi. He has no rales.   Lungs are clear to auscultation bilaterally. Catheter in place in right chest wall. Entry site is clean, dry, and intact with no erythema.  However, suture sites of catheter are slight erythematous and tender to touch.  No visible drainage.   Abdominal: Soft. Bowel sounds are normal. He exhibits no distension. There is no tenderness. There is no rebound and no guarding.   Musculoskeletal: Normal range of motion. He exhibits no edema or tenderness.   No lower extremity edema bilaterally.   Lymphadenopathy:     He has no cervical adenopathy.   Neurological: He is alert and oriented to person, place, and time.   Skin: Skin is warm and dry. No rash and no abscess noted. No  erythema. No pallor.   Psychiatric: He has a normal mood and affect. His behavior is normal. Judgment and thought content normal.         ED Course   Procedures  Labs Reviewed   COMPREHENSIVE METABOLIC PANEL - Abnormal; Notable for the following:        Result Value    CO2 18 (*)     BUN, Bld 68 (*)     Creatinine 13.7 (*)     Calcium 8.2 (*)     Total Protein 9.1 (*)     Albumin 2.8 (*)     ALT 7 (*)     eGFR if  4 (*)     eGFR if non  4 (*)     All other components within normal limits   CBC W/ AUTO DIFFERENTIAL - Abnormal; Notable for the following:     RBC 3.23 (*)     Hemoglobin 9.4 (*)     Hematocrit 28.3 (*)     RDW 15.0 (*)     Platelets 125 (*)     Platelet Estimate Decreased (*)     All other components within normal limits   CULTURE, BLOOD   INFLUENZA A AND B ANTIGEN   MAGNESIUM     Imaging Results         X-Ray Chest 1 View (Final result) Result time:  02/22/17 02:08:31    Final result by Flowre Rosen MD (02/22/17 02:08:31)    Impression:        No detrimental change or radiographic acute intrathoracic process seen on this single view.      Electronically signed by: FLOWER ROSEN MD, MD  Date:     02/22/17  Time:    02:08     Narrative:    COMPARISON: Chest radiograph 2/11/17    FINDINGS: AP portable semiupright view of the chest. Right-sided CVC appears grossly stable. Cardiomediastinal silhouette is stable without evidence of failure. The lungs are symmetrically normally inflated and grossly clear. No large pleural effusion or pneumothorax. Included osseous structures appear stable without acute process seen.              EKG Readings: (Independently Interpreted)   2:43 AM- Normal sinus rhythm at rate of 93 bpm. Left axis deviation. Normal intervals. Anterior Q waves. No other ST or ischemic changes.             X-Rays:   Independently Interpreted Readings:   Chest X-Ray: Trachea midline. Right-sided catheter in place. No cardiomegaly. No infiltrate, effusion, or  edema.      Medical Decision Making:   History:   Old Medical Records: I decided to obtain old medical records.  Old Records Summarized: records from clinic visits, records from previous admission(s) and other records.  Initial Assessment:   1:45AM:  Pt is a 50 y/o M who presents to ED with congestion, cough, N/V, myalgias, occurring during dialysis today and therefore did not finish session.  Pt appears well, nontoxic.  His catheter does have a mild amount of erythema along the suture site, and the area is slightly tender to touch, may be developing a slight infection. Will plan for labs, EKG, CXR, influenza. Will continue to follow and reassess.    Independently Interpreted Test(s):   I have ordered and independently interpreted X-rays - see prior notes.  I have ordered and independently interpreted EKG Reading(s) - see prior notes  Clinical Tests:   Lab Tests: Ordered and Reviewed  Radiological Study: Ordered and Reviewed  Medical Tests: Ordered and Reviewed    3:24 AM: Pt doing well. He remains stable and feels better. He has not had any episodes of vomiting here. His labs are unremarkable with a normal potassium and a stable CXR and EKG.  Given the developing erythema of suture sites, will plan to treat with a course of keflex and have him f/u with his nephrologist. I updated pt regarding results and discussed plan.  I counseled pt regarding supportive care measures.  I have discussed with the pt ED return warnings and need for close f/u.  Pt agreeable to plan and all questions answered.  I feel that pt is stable for discharge and management as an outpatient and no further intervention is needed at this time.  Pt is comfortable returning to the ED if needed.  Will DC home in stable condition.                Scribe Attestation:   Scribe #1: I performed the above scribed service and the documentation accurately describes the services I performed. I attest to the accuracy of the note.    Attending Attestation:            Physician Attestation for Scribe:  Physician Attestation Statement for Scribe #1: I, Dr. Pichardo, reviewed documentation, as scribed by Tori Crocker  in my presence, and it is both accurate and complete.                 ED Course     Clinical Impression:     1. Myalgia    2. Cough                Nubia Pichardo MD  02/22/17 0438

## 2017-02-23 NOTE — ED NOTES
Patient notified per MD request of positive blood culture and need to return to ED for possible admit.

## 2017-02-24 LAB
BACTERIA BLD CULT: NORMAL

## 2018-01-06 PROCEDURE — 96366 THER/PROPH/DIAG IV INF ADDON: CPT

## 2018-01-06 PROCEDURE — 96365 THER/PROPH/DIAG IV INF INIT: CPT

## 2018-01-06 PROCEDURE — 99285 EMERGENCY DEPT VISIT HI MDM: CPT | Mod: 25

## 2018-01-06 PROCEDURE — 21400001 HC TELEMETRY ROOM

## 2018-01-06 PROCEDURE — 96375 TX/PRO/DX INJ NEW DRUG ADDON: CPT

## 2018-01-07 ENCOUNTER — HOSPITAL ENCOUNTER (INPATIENT)
Facility: HOSPITAL | Age: 53
LOS: 3 days | Discharge: HOME | DRG: 640 | End: 2018-01-10
Attending: EMERGENCY MEDICINE | Admitting: INTERNAL MEDICINE
Payer: MEDICAID

## 2018-01-07 DIAGNOSIS — R31.9 HEMATURIA, UNSPECIFIED TYPE: ICD-10-CM

## 2018-01-07 DIAGNOSIS — Z99.2 HEMODIALYSIS STATUS: ICD-10-CM

## 2018-01-07 DIAGNOSIS — N18.6 ESRD ON DIALYSIS: Chronic | ICD-10-CM

## 2018-01-07 DIAGNOSIS — R56.9 SEIZURE: Chronic | ICD-10-CM

## 2018-01-07 DIAGNOSIS — N18.6 ESRD (END STAGE RENAL DISEASE): ICD-10-CM

## 2018-01-07 DIAGNOSIS — D63.1 ANEMIA OF CHRONIC RENAL FAILURE, STAGE 5: ICD-10-CM

## 2018-01-07 DIAGNOSIS — Z99.2 ESRD ON DIALYSIS: Chronic | ICD-10-CM

## 2018-01-07 DIAGNOSIS — Z91.199 NONCOMPLIANCE: Primary | Chronic | ICD-10-CM

## 2018-01-07 DIAGNOSIS — B20 HIV DISEASE: ICD-10-CM

## 2018-01-07 DIAGNOSIS — Z91.199 H/O NONCOMPLIANCE WITH MEDICAL TREATMENT, PRESENTING HAZARDS TO HEALTH: ICD-10-CM

## 2018-01-07 DIAGNOSIS — R07.9 CHEST PAIN: ICD-10-CM

## 2018-01-07 DIAGNOSIS — E87.5 HYPERKALEMIA, DIMINISHED RENAL EXCRETION: ICD-10-CM

## 2018-01-07 DIAGNOSIS — L02.91 ABSCESS: ICD-10-CM

## 2018-01-07 DIAGNOSIS — K92.2 GI BLEED: ICD-10-CM

## 2018-01-07 DIAGNOSIS — K62.5 BRBPR (BRIGHT RED BLOOD PER RECTUM): ICD-10-CM

## 2018-01-07 DIAGNOSIS — E87.70 HYPERVOLEMIA, UNSPECIFIED HYPERVOLEMIA TYPE: ICD-10-CM

## 2018-01-07 DIAGNOSIS — J01.90 ACUTE SINUSITIS: ICD-10-CM

## 2018-01-07 DIAGNOSIS — I10 ESSENTIAL HYPERTENSION: Chronic | ICD-10-CM

## 2018-01-07 DIAGNOSIS — N18.5 ANEMIA OF CHRONIC RENAL FAILURE, STAGE 5: ICD-10-CM

## 2018-01-07 DIAGNOSIS — D64.9 ANEMIA, UNSPECIFIED TYPE: ICD-10-CM

## 2018-01-07 DIAGNOSIS — R10.9 ABDOMINAL PAIN, UNSPECIFIED ABDOMINAL LOCATION: ICD-10-CM

## 2018-01-07 DIAGNOSIS — L98.9 SKIN LESION: ICD-10-CM

## 2018-01-07 DIAGNOSIS — E87.5 HYPERKALEMIA: ICD-10-CM

## 2018-01-07 LAB
ALBUMIN SERPL BCP-MCNC: 2.9 G/DL
ALP SERPL-CCNC: 72 U/L
ALT SERPL W/O P-5'-P-CCNC: 9 U/L
ANION GAP SERPL CALC-SCNC: 15 MMOL/L
APTT BLDCRRT: 31.1 SEC
AST SERPL-CCNC: 17 U/L
BACTERIA #/AREA URNS HPF: ABNORMAL /HPF
BASOPHILS # BLD AUTO: 0 K/UL
BASOPHILS NFR BLD: 0 %
BILIRUB SERPL-MCNC: 0.3 MG/DL
BILIRUB UR QL STRIP: NEGATIVE
BNP SERPL-MCNC: 3004 PG/ML
BUN SERPL-MCNC: 129 MG/DL
CALCIUM SERPL-MCNC: 9 MG/DL
CHLORIDE SERPL-SCNC: 116 MMOL/L
CLARITY UR: CLEAR
CO2 SERPL-SCNC: 10 MMOL/L
COLOR UR: ABNORMAL
CREAT SERPL-MCNC: 21.6 MG/DL
CTP QC/QA: YES
DIFFERENTIAL METHOD: ABNORMAL
EOSINOPHIL # BLD AUTO: 0.1 K/UL
EOSINOPHIL NFR BLD: 1.7 %
ERYTHROCYTE [DISTWIDTH] IN BLOOD BY AUTOMATED COUNT: 15.8 %
EST. GFR  (AFRICAN AMERICAN): 2 ML/MIN/1.73 M^2
EST. GFR  (NON AFRICAN AMERICAN): 2 ML/MIN/1.73 M^2
FECAL OCCULT BLOOD, POC: POSITIVE
GLUCOSE SERPL-MCNC: 88 MG/DL
GLUCOSE UR QL STRIP: ABNORMAL
HCT VFR BLD AUTO: 22.6 %
HGB BLD-MCNC: 7.4 G/DL
HGB UR QL STRIP: ABNORMAL
HYALINE CASTS #/AREA URNS LPF: 0 /LPF
INR PPP: 1
KETONES UR QL STRIP: NEGATIVE
LACTATE SERPL-SCNC: 1.2 MMOL/L
LEUKOCYTE ESTERASE UR QL STRIP: NEGATIVE
LIPASE SERPL-CCNC: 232 U/L
LYMPHOCYTES # BLD AUTO: 1 K/UL
LYMPHOCYTES NFR BLD: 24.1 %
MCH RBC QN AUTO: 30.8 PG
MCHC RBC AUTO-ENTMCNC: 32.7 G/DL
MCV RBC AUTO: 94 FL
MICROSCOPIC COMMENT: ABNORMAL
MONOCYTES # BLD AUTO: 0.4 K/UL
MONOCYTES NFR BLD: 8.5 %
NEUTROPHILS # BLD AUTO: 2.7 K/UL
NEUTROPHILS NFR BLD: 65.9 %
NITRITE UR QL STRIP: NEGATIVE
PH UR STRIP: 6 [PH] (ref 5–8)
PLATELET # BLD AUTO: 236 K/UL
PMV BLD AUTO: 10 FL
POCT GLUCOSE: 109 MG/DL (ref 70–110)
POTASSIUM SERPL-SCNC: 6.7 MMOL/L
PROT SERPL-MCNC: 9.9 G/DL
PROT UR QL STRIP: ABNORMAL
PROTHROMBIN TIME: 10.7 SEC
RBC # BLD AUTO: 2.4 M/UL
RBC #/AREA URNS HPF: 12 /HPF (ref 0–4)
SODIUM SERPL-SCNC: 141 MMOL/L
SP GR UR STRIP: 1.01 (ref 1–1.03)
SQUAMOUS #/AREA URNS HPF: 2 /HPF
TROPONIN I SERPL DL<=0.01 NG/ML-MCNC: 0.08 NG/ML
URN SPEC COLLECT METH UR: ABNORMAL
UROBILINOGEN UR STRIP-ACNC: NEGATIVE EU/DL
WBC # BLD AUTO: 4.11 K/UL
WBC #/AREA URNS HPF: 2 /HPF (ref 0–5)

## 2018-01-07 PROCEDURE — 80100016 HC MAINTENANCE HEMODIALYSIS

## 2018-01-07 PROCEDURE — 83605 ASSAY OF LACTIC ACID: CPT

## 2018-01-07 PROCEDURE — 81000 URINALYSIS NONAUTO W/SCOPE: CPT

## 2018-01-07 PROCEDURE — 25000003 PHARM REV CODE 250: Performed by: NURSE PRACTITIONER

## 2018-01-07 PROCEDURE — 83690 ASSAY OF LIPASE: CPT

## 2018-01-07 PROCEDURE — 93005 ELECTROCARDIOGRAM TRACING: CPT

## 2018-01-07 PROCEDURE — 80053 COMPREHEN METABOLIC PANEL: CPT

## 2018-01-07 PROCEDURE — 86359 T CELLS TOTAL COUNT: CPT

## 2018-01-07 PROCEDURE — 85610 PROTHROMBIN TIME: CPT

## 2018-01-07 PROCEDURE — 84484 ASSAY OF TROPONIN QUANT: CPT

## 2018-01-07 PROCEDURE — 85730 THROMBOPLASTIN TIME PARTIAL: CPT

## 2018-01-07 PROCEDURE — 87536 HIV-1 QUANT&REVRSE TRNSCRPJ: CPT

## 2018-01-07 PROCEDURE — 86360 T CELL ABSOLUTE COUNT/RATIO: CPT

## 2018-01-07 PROCEDURE — 94640 AIRWAY INHALATION TREATMENT: CPT

## 2018-01-07 PROCEDURE — 36415 COLL VENOUS BLD VENIPUNCTURE: CPT

## 2018-01-07 PROCEDURE — 93010 ELECTROCARDIOGRAM REPORT: CPT | Mod: ,,, | Performed by: INTERNAL MEDICINE

## 2018-01-07 PROCEDURE — 87040 BLOOD CULTURE FOR BACTERIA: CPT | Mod: 59

## 2018-01-07 PROCEDURE — 25000003 PHARM REV CODE 250: Performed by: INTERNAL MEDICINE

## 2018-01-07 PROCEDURE — 85025 COMPLETE CBC W/AUTO DIFF WBC: CPT

## 2018-01-07 PROCEDURE — 63600175 PHARM REV CODE 636 W HCPCS: Performed by: NURSE PRACTITIONER

## 2018-01-07 PROCEDURE — 21400001 HC TELEMETRY ROOM

## 2018-01-07 PROCEDURE — 83880 ASSAY OF NATRIURETIC PEPTIDE: CPT

## 2018-01-07 PROCEDURE — 25000242 PHARM REV CODE 250 ALT 637 W/ HCPCS: Performed by: NURSE PRACTITIONER

## 2018-01-07 RX ORDER — SODIUM CHLORIDE 9 MG/ML
INJECTION, SOLUTION INTRAVENOUS ONCE
Status: DISCONTINUED | OUTPATIENT
Start: 2018-01-07 | End: 2018-01-10 | Stop reason: HOSPADM

## 2018-01-07 RX ORDER — SODIUM CHLORIDE 9 MG/ML
INJECTION, SOLUTION INTRAVENOUS
Status: DISCONTINUED | OUTPATIENT
Start: 2018-01-07 | End: 2018-01-10 | Stop reason: HOSPADM

## 2018-01-07 RX ORDER — HYDRALAZINE HYDROCHLORIDE 20 MG/ML
10 INJECTION INTRAMUSCULAR; INTRAVENOUS EVERY 4 HOURS PRN
Status: DISCONTINUED | OUTPATIENT
Start: 2018-01-07 | End: 2018-01-10 | Stop reason: HOSPADM

## 2018-01-07 RX ORDER — ASPIRIN 325 MG
325 TABLET ORAL
Status: DISCONTINUED | OUTPATIENT
Start: 2018-01-07 | End: 2018-01-07

## 2018-01-07 RX ORDER — ALBUTEROL SULFATE 2.5 MG/.5ML
2.5 SOLUTION RESPIRATORY (INHALATION)
Status: COMPLETED | OUTPATIENT
Start: 2018-01-07 | End: 2018-01-07

## 2018-01-07 RX ORDER — SODIUM BICARBONATE 1 MEQ/ML
50 SYRINGE (ML) INTRAVENOUS
Status: COMPLETED | OUTPATIENT
Start: 2018-01-07 | End: 2018-01-07

## 2018-01-07 RX ORDER — HYDROCODONE BITARTRATE AND ACETAMINOPHEN 5; 325 MG/1; MG/1
1 TABLET ORAL EVERY 4 HOURS PRN
Status: DISCONTINUED | OUTPATIENT
Start: 2018-01-07 | End: 2018-01-10 | Stop reason: HOSPADM

## 2018-01-07 RX ORDER — ACETAMINOPHEN 325 MG/1
650 TABLET ORAL EVERY 8 HOURS PRN
Status: DISCONTINUED | OUTPATIENT
Start: 2018-01-07 | End: 2018-01-10 | Stop reason: HOSPADM

## 2018-01-07 RX ORDER — AMLODIPINE BESYLATE 5 MG/1
10 TABLET ORAL DAILY
Status: DISCONTINUED | OUTPATIENT
Start: 2018-01-07 | End: 2018-01-10 | Stop reason: HOSPADM

## 2018-01-07 RX ORDER — ACYCLOVIR 200 MG/1
800 CAPSULE ORAL 3 TIMES DAILY
Status: DISCONTINUED | OUTPATIENT
Start: 2018-01-07 | End: 2018-01-10 | Stop reason: HOSPADM

## 2018-01-07 RX ORDER — SEVELAMER CARBONATE 800 MG/1
1600 TABLET, FILM COATED ORAL
Status: DISCONTINUED | OUTPATIENT
Start: 2018-01-07 | End: 2018-01-10 | Stop reason: HOSPADM

## 2018-01-07 RX ORDER — CLONIDINE HYDROCHLORIDE 0.1 MG/1
0.2 TABLET ORAL
Status: COMPLETED | OUTPATIENT
Start: 2018-01-07 | End: 2018-01-07

## 2018-01-07 RX ORDER — DOXYCYCLINE HYCLATE 100 MG
100 TABLET ORAL EVERY 12 HOURS
Status: DISCONTINUED | OUTPATIENT
Start: 2018-01-07 | End: 2018-01-10 | Stop reason: HOSPADM

## 2018-01-07 RX ORDER — ONDANSETRON 4 MG/1
4 TABLET, ORALLY DISINTEGRATING ORAL EVERY 8 HOURS PRN
Status: DISCONTINUED | OUTPATIENT
Start: 2018-01-07 | End: 2018-01-10 | Stop reason: HOSPADM

## 2018-01-07 RX ORDER — HYDROCODONE BITARTRATE AND ACETAMINOPHEN 10; 325 MG/1; MG/1
1 TABLET ORAL EVERY 4 HOURS PRN
Status: DISCONTINUED | OUTPATIENT
Start: 2018-01-07 | End: 2018-01-10 | Stop reason: HOSPADM

## 2018-01-07 RX ORDER — MUPIROCIN 20 MG/G
1 OINTMENT TOPICAL 3 TIMES DAILY
Status: DISCONTINUED | OUTPATIENT
Start: 2018-01-07 | End: 2018-01-10 | Stop reason: HOSPADM

## 2018-01-07 RX ORDER — LEVETIRACETAM 500 MG/1
500 TABLET ORAL 2 TIMES DAILY
Status: DISCONTINUED | OUTPATIENT
Start: 2018-01-07 | End: 2018-01-10 | Stop reason: HOSPADM

## 2018-01-07 RX ORDER — ONDANSETRON 2 MG/ML
4 INJECTION INTRAMUSCULAR; INTRAVENOUS EVERY 8 HOURS PRN
Status: DISCONTINUED | OUTPATIENT
Start: 2018-01-07 | End: 2018-01-10 | Stop reason: HOSPADM

## 2018-01-07 RX ORDER — PANTOPRAZOLE SODIUM 40 MG/1
40 TABLET, DELAYED RELEASE ORAL DAILY
Status: DISCONTINUED | OUTPATIENT
Start: 2018-01-07 | End: 2018-01-10 | Stop reason: HOSPADM

## 2018-01-07 RX ORDER — DEXTROSE 50 % IN WATER (D50W) INTRAVENOUS SYRINGE
25
Status: COMPLETED | OUTPATIENT
Start: 2018-01-07 | End: 2018-01-07

## 2018-01-07 RX ORDER — FLUOXETINE 10 MG/1
10 CAPSULE ORAL DAILY
Status: DISCONTINUED | OUTPATIENT
Start: 2018-01-07 | End: 2018-01-10 | Stop reason: HOSPADM

## 2018-01-07 RX ADMIN — LEVETIRACETAM 500 MG: 500 TABLET, FILM COATED ORAL at 10:01

## 2018-01-07 RX ADMIN — SODIUM BICARBONATE 50 MEQ: 84 INJECTION, SOLUTION INTRAVENOUS at 05:01

## 2018-01-07 RX ADMIN — DOXYCYCLINE HYCLATE 100 MG: 100 TABLET, COATED ORAL at 10:01

## 2018-01-07 RX ADMIN — CALCIUM GLUCONATE 1 G: 94 INJECTION, SOLUTION INTRAVENOUS at 04:01

## 2018-01-07 RX ADMIN — CLONIDINE HYDROCHLORIDE 0.2 MG: 0.1 TABLET ORAL at 04:01

## 2018-01-07 RX ADMIN — ACYCLOVIR 800 MG: 200 CAPSULE ORAL at 10:01

## 2018-01-07 RX ADMIN — AMLODIPINE BESYLATE 10 MG: 5 TABLET ORAL at 09:01

## 2018-01-07 RX ADMIN — MUPIROCIN 22 G: 20 OINTMENT TOPICAL at 10:01

## 2018-01-07 RX ADMIN — LEVETIRACETAM 500 MG: 500 TABLET, FILM COATED ORAL at 09:01

## 2018-01-07 RX ADMIN — HYDROCODONE BITARTRATE AND ACETAMINOPHEN 1 TABLET: 5; 325 TABLET ORAL at 05:01

## 2018-01-07 RX ADMIN — DOXYCYCLINE HYCLATE 100 MG: 100 TABLET, COATED ORAL at 09:01

## 2018-01-07 RX ADMIN — INSULIN HUMAN 10 UNITS: 100 INJECTION, SOLUTION PARENTERAL at 05:01

## 2018-01-07 RX ADMIN — MUPIROCIN 22 G: 20 OINTMENT TOPICAL at 01:01

## 2018-01-07 RX ADMIN — DEXTROSE MONOHYDRATE 25 G: 500 INJECTION PARENTERAL at 05:01

## 2018-01-07 RX ADMIN — SEVELAMER CARBONATE 1600 MG: 800 TABLET, FILM COATED ORAL at 11:01

## 2018-01-07 RX ADMIN — ALBUTEROL SULFATE 2.5 MG: 2.5 SOLUTION RESPIRATORY (INHALATION) at 04:01

## 2018-01-07 RX ADMIN — SODIUM POLYSTYRENE SULFONATE 15 G: 15 SUSPENSION ORAL; RECTAL at 04:01

## 2018-01-07 RX ADMIN — PANTOPRAZOLE SODIUM 40 MG: 40 TABLET, DELAYED RELEASE ORAL at 11:01

## 2018-01-07 RX ADMIN — ONDANSETRON 4 MG: 4 TABLET, ORALLY DISINTEGRATING ORAL at 07:01

## 2018-01-07 NOTE — ED TRIAGE NOTES
Pt reports coughing up blood earlier today.  Pt also reports of abd cramping, headache and generalized weakness.  Pt with groin abscess and to the buttocks.

## 2018-01-07 NOTE — H&P
[facility] Hospital Medicine  History & Physical    Patient Name: Greyson Hudson III  MRN: [id number]  Admission Date: 1/7/2018  Attending Physician: Freida Moore MD  Primary Care Provider: Primary Doctor No         Patient information was obtained from patient, past medical records and ER records.     Subjective:     Principal Problem:Hyperkalemia, diminished renal excretion    Chief Complaint:   Chief Complaint   Patient presents with    Hemoptysis     reports he started coughing up blood starting 20 mins pta, states coughing started 2 days ago    Abscess     to groin, starting yesterday, hx of sypholisis as well as HIV        HPI: Mr. Hudson is a 53 yo man with ESRD on dialysis TTS, non-compliance, MDD, HTN, seizure disorder, HIV not on ART CD4 120 in 10/2017, hx syphilis, anemia in chronic kidney disease, 2/2 hyperparathyroidism, hyperphosphatemia, hx UGIB, julius-wilson tear, and stroke who presented with multiple complaints and was found to have a potassium of 6.7. He reports cough x2 days and streaky hemoptysis 20 min prior to calling EMS. He complains of generalized weakness, abdominal cramps, chest pain, and intermittent shortness of breath that are chronic. He has lesions on his penis, left axilla, and gluteal fold that he reports started 2 day ago. He also reports recent diarrhea with blood in his stool, subjective fever/chills, congestion with rhinorrhea, and cough. He has been noncompliant with HD and cannot tell me the last time he attended dialysis. He cannot tell me the name of any of his medications or how many he takes. He cannot tell me the name of any of his doctors; he just knows he usually goes to [facility].    In the ER he was found to have hyperkalemia. No peaked T waves. Nephrology was consulted. He was started on doxycycline and ID was consulted. He was also found to have +FOBT and GI was consulted.    Past Medical History:   Diagnosis Date    HIV (human immunodeficiency  virus infection)     Hypertension     Kidney failure     Seizure disorder 2016       Past Surgical History:   Procedure Laterality Date    fistula creation Right 01/2016       Review of patient's allergies indicates:   Allergen Reactions    Pcn [penicillins] Itching       No current facility-administered medications on file prior to encounter.      Current Outpatient Prescriptions on File Prior to Encounter   Medication Sig    amlodipine (NORVASC) 10 MG tablet Take 1 tablet (10 mg total) by mouth once daily.    hydrocodone-acetaminophen 5-325mg (NORCO) 5-325 mg per tablet Take 1 tablet by mouth every 4 (four) hours as needed for Pain.    levetiracetam (KEPPRA) 250 MG Tab Take 2 tablets (500 mg total) by mouth 2 (two) times daily.    sevelamer carbonate (RENVELA) 800 mg Tab Take 2 tablets (1,600 mg total) by mouth 3 (three) times daily with meals.     Family History     None        Social History Main Topics    Smoking status: Never Smoker    Smokeless tobacco: Never Used    Alcohol use No    Drug use: No    Sexual activity: Not Currently     Partners: Female     Review of Systems   Constitutional: Positive for appetite change, chills, fatigue and fever.   HENT: Positive for congestion, ear pain and rhinorrhea.    Eyes: Positive for discharge and itching.   Respiratory: Positive for cough, shortness of breath and wheezing.    Cardiovascular: Positive for chest pain.   Gastrointestinal: Positive for abdominal pain, blood in stool and diarrhea.   Musculoskeletal: Positive for arthralgias and myalgias.   Skin: Positive for rash and wound.   Neurological: Positive for weakness and headaches.     Objective:     Vital Signs (Most Recent):  Temp: 97.9 °F (36.6 °C) (01/07/18 1210)  Pulse: 94 (01/07/18 1210)  Resp: 20 (01/07/18 1210)  BP: (!) 164/81 (01/07/18 1210)  SpO2: 100 % (01/07/18 1210) Vital Signs (24h Range):  Temp:  [97.9 °F (36.6 °C)-99.1 °F (37.3 °C)] 97.9 °F (36.6 °C)  Pulse:  [86-98] 94  Resp:   [17-20] 20  SpO2:  [96 %-100 %] 100 %  BP: (164-199)/() 164/81     Weight: 56.6 kg (124 lb 12.5 oz)  Body mass index is 20.14 kg/m².    Physical Exam   Constitutional: He is oriented to person, place, and time. He appears well-developed and well-nourished. No distress.   Chronically ill appearing. Appears older than stated age.   HENT:   Right Ear: External ear normal.   Left Ear: External ear normal.   Nose: Nose normal.   Eyes: EOM are normal. Pupils are equal, round, and reactive to light. No scleral icterus.   Neck: Normal range of motion. Neck supple. No thyromegaly present.   Cardiovascular: Normal rate and normal heart sounds.  Exam reveals no friction rub.    No murmur heard.  Pulmonary/Chest: Effort normal and breath sounds normal. No respiratory distress. He has no wheezes. He has no rales.   Abdominal: Soft. Bowel sounds are normal. He exhibits no mass. There is no tenderness.   Musculoskeletal: Normal range of motion. He exhibits no edema or deformity.   Neurological: He is alert and oriented to person, place, and time. No cranial nerve deficit.   Skin: Skin is warm and dry. No pallor.   Penile shaft lesion without ulceration or tenderness  Left axilla lesion without ulceration, +tender  Lesions in gluteal fold with excoriation.  No erythema or purulence.         CRANIAL NERVES     CN III, IV, VI   Pupils are equal, round, and reactive to light.  Extraocular motions are normal.        Significant Labs: All pertinent labs within the past 24 hours have been reviewed.    Significant Imaging: I have reviewed and interpreted all pertinent imaging results/findings within the past 24 hours.    Assessment/Plan:     * Hyperkalemia, diminished renal excretion    Given Kayexalate, D5/insulin in the ER. Nephrology consulted for HD.        GI bleed    He has a history of UGIB with Karen-Ferreira tear diagnosed at [facility]. +FOBT. Started on PPI. Appreciate GI recs. EGD 1/8/2017. H&H near baseline. Monitor. VSS.         Skin lesion    Recently diagnosed with genital herpes and started on Acyclovir. Though he says these lesions are new, he is not the greatest historian. Restart acyclovir. Appreciate ID input.        Acute sinusitis    Started on Doxycycline in the ER. No fever or leukocytosis. Symptomatic treatment. On exam he exhibits no signs of sinusitis.        Seizure    Unspecified seizure disorder. Started on home keppra.        HIV disease    Last CD4 of 120/10.4 % 10/9/2017 during inpatient admission.  [facility] records 7/21/17 reviewed not on ART at that time. Dapsone prophylaxis.   States that he has not taken HIV medications since the 80s and is not really interested in taking HIV medications.        Anemia of chronic renal failure, stage 5    CBC 10/10/17  Hemoglobin 8.0   Hematocrit 23.6   MCV 90.8         Essential hypertension    Suspect uncontrolled due to non-compliance with HD. Does not appear to have medication for HTN based on [facility] records. Amlodipine started in ER. PRN.        ESRD on dialysis    Non-complaint with HD.   Dialysis schedule Tues/Thurs/Sat at [facility] on [address].   Multiple admissions for emergent dialysis.  Reports that he has issues with his transportation.   He has a  through [facility]. [email or web address]          VTE Risk Mitigation         Ordered     Medium Risk of VTE  Once      01/07/18 0843     Reason for no Mechanical VTE Prophylaxis  Once      01/07/18 0843             Freida Moore MD  Department of Hospital Medicine   [facility]

## 2018-01-07 NOTE — NURSING
Report received from Nanci.  Patient accompanied to bed.  Patient arrived from the ER.  Telemetry monitoring continued.

## 2018-01-07 NOTE — ASSESSMENT & PLAN NOTE
Suspect uncontrolled due to non-compliance with HD. Does not appear to have medication for HTN based on [facility] records. Amlodipine started in ER. PRN.

## 2018-01-07 NOTE — ASSESSMENT & PLAN NOTE
He has a history of UGIB with Karen-Ferreira tear diagnosed at [facility]. +FOBT. Started on PPI. Appreciate GI recs. EGD 1/8/2017. H&H near baseline. Monitor. VSS.

## 2018-01-07 NOTE — HPI
Mr. Hudson is a 53 yo man with ESRD on dialysis TTS, non-compliance, MDD, HTN, seizure disorder, HIV not on ART CD4 120 in 10/2017, hx syphilis, anemia in chronic kidney disease, 2/2 hyperparathyroidism, hyperphosphatemia, hx UGIB, julius-wilson tear, and stroke who presented with multiple complaints and was found to have a potassium of 6.7. He reports cough x2 days and streaky hemoptysis 20 min prior to calling EMS. He complains of generalized weakness, abdominal cramps, chest pain, and intermittent shortness of breath that are chronic. He has lesions on his penis, left axilla, and gluteal fold that he reports started 2 day ago. He also reports recent diarrhea with blood in his stool, subjective fever/chills, congestion with rhinorrhea, and cough. He has been noncompliant with HD and cannot tell me the last time he attended dialysis. He cannot tell me the name of any of his medications or how many he takes. He cannot tell me the name of any of his doctors; he just knows he usually goes to [facility].

## 2018-01-07 NOTE — PROGRESS NOTES
Patient went to dialysis as ordered. Patient awake alert and oriented without complaints of discomfort no apparent distress noted.

## 2018-01-07 NOTE — ASSESSMENT & PLAN NOTE
Started on Doxycycline in the ER. No fever or leukocytosis. Symptomatic treatment. On exam he exhibits no signs of sinusitis.

## 2018-01-07 NOTE — CONSULTS
Patient examined and consult dictated:202054  1. abdomianal pain  2. Lower GI bleeding  3. ESRD  4. HTN  5. Hemoptysis  6.Hx of HIV  Plan; will start on pantoprazole 40mg po qd. Schedule for EGD in AM. Thanks for the consult.

## 2018-01-07 NOTE — PLAN OF CARE
Problem: Hemodialysis (Adult)  Intervention: Monitor/Manage Fluid Electrolyte/Acid Base Balance   01/07/18 1753   Positioning   Body Position ambulate in room       Goal: Signs and Symptoms of Listed Potential Problems Will be Absent, Minimized or Managed (Hemodialysis)  Signs and symptoms of listed potential problems will be absent, minimized or managed by discharge/transition of care (reference Hemodialysis (Adult) CPG).   Outcome: Ongoing (interventions implemented as appropriate)   01/07/18 1753   Hemodialysis   Problems Assessed (Hemodialysis) all

## 2018-01-07 NOTE — ASSESSMENT & PLAN NOTE
Non-complaint with HD.   Dialysis schedule Tues/Thurs/Sat at [facility] on [address].   Multiple admissions for emergent dialysis.  Reports that he has issues with his transportation.   He has a  through [email or web address]

## 2018-01-07 NOTE — CONSULTS
52 y o AAM with hx of esrd htn hiv 2nd hypeprth and anemia of ckd admitted with hyperK and htn. Apparently he goes to HD at [facility] but has missed number of treatments. Has no good excuse for missing.    I saw him during his rx and he has no fluid on to speak off. Co of the length of treatment and want to come off. We are trying to convince him to stay at least 3 hours.    Very weak voiced difficult to get a ros    Past Medical History:   Diagnosis Date    HIV (human immunodeficiency virus infection)     Hypertension     Kidney failure     Seizure disorder 2016     Social History     Social History    Marital status: Single     Spouse name: N/A    Number of children: N/A    Years of education: N/A     Occupational History    Not on file.     Social History Main Topics    Smoking status: Never Smoker    Smokeless tobacco: Never Used    Alcohol use No    Drug use: No    Sexual activity: Not Currently     Partners: Female     Other Topics Concern    Not on file     Social History Narrative    No narrative on file     History reviewed. No pertinent family history.    Review of patient's allergies indicates:   Allergen Reactions    Pcn [penicillins] Itching       Current Facility-Administered Medications   Medication    0.9%  NaCl infusion    0.9%  NaCl infusion    0.9%  NaCl infusion    0.9%  NaCl infusion    acetaminophen tablet 650 mg    acetaminophen tablet 650 mg    acyclovir capsule 800 mg    amLODIPine tablet 10 mg    doxycycline tablet 100 mg    FLUoxetine capsule 10 mg    hydrALAZINE injection 10 mg    hydrocodone-acetaminophen 10-325mg per tablet 1 tablet    hydrocodone-acetaminophen 5-325mg per tablet 1 tablet    levETIRAcetam tablet 500 mg    mupirocin 2 % ointment 22 g    ondansetron disintegrating tablet 4 mg    ondansetron injection 4 mg    pantoprazole EC tablet 40 mg    sevelamer carbonate tablet 1,600 mg       LABS    Recent Results (from the past 24 hour(s))   POCT occult  blood stool    Collection Time: 01/07/18  1:54 AM   Result Value Ref Range    Fecal Occult Blood Positive (A) Negative     Acceptable Yes    Blood culture    Collection Time: 01/07/18  2:46 AM   Result Value Ref Range    Blood Culture, Routine No Growth to date    CBC auto differential    Collection Time: 01/07/18  2:48 AM   Result Value Ref Range    WBC 4.11 3.90 - 12.70 K/uL    RBC 2.40 (L) 4.60 - 6.20 M/uL    Hemoglobin 7.4 (L) 14.0 - 18.0 g/dL    Hematocrit 22.6 (L) 40.0 - 54.0 %    MCV 94 82 - 98 fL    MCH 30.8 27.0 - 31.0 pg    MCHC 32.7 32.0 - 36.0 g/dL    RDW 15.8 (H) 11.5 - 14.5 %    Platelets 236 150 - 350 K/uL    MPV 10.0 9.2 - 12.9 fL    Gran # 2.7 1.8 - 7.7 K/uL    Lymph # 1.0 1.0 - 4.8 K/uL    Mono # 0.4 0.3 - 1.0 K/uL    Eos # 0.1 0.0 - 0.5 K/uL    Baso # 0.00 0.00 - 0.20 K/uL    Gran% 65.9 38.0 - 73.0 %    Lymph% 24.1 18.0 - 48.0 %    Mono% 8.5 4.0 - 15.0 %    Eosinophil% 1.7 0.0 - 8.0 %    Basophil% 0.0 0.0 - 1.9 %    Differential Method Automated    Comprehensive metabolic panel    Collection Time: 01/07/18  2:48 AM   Result Value Ref Range    Sodium 141 136 - 145 mmol/L    Potassium 6.7 (HH) 3.5 - 5.1 mmol/L    Chloride 116 (H) 95 - 110 mmol/L    CO2 10 (L) 23 - 29 mmol/L    Glucose 88 70 - 110 mg/dL    BUN, Bld 129 (H) 6 - 20 mg/dL    Creatinine 21.6 (H) 0.5 - 1.4 mg/dL    Calcium 9.0 8.7 - 10.5 mg/dL    Total Protein 9.9 (H) 6.0 - 8.4 g/dL    Albumin 2.9 (L) 3.5 - 5.2 g/dL    Total Bilirubin 0.3 0.1 - 1.0 mg/dL    Alkaline Phosphatase 72 55 - 135 U/L    AST 17 10 - 40 U/L    ALT 9 (L) 10 - 44 U/L    Anion Gap 15 8 - 16 mmol/L    eGFR if African American 2 (A) >60 mL/min/1.73 m^2    eGFR if non African American 2 (A) >60 mL/min/1.73 m^2   Troponin I #1    Collection Time: 01/07/18  2:48 AM   Result Value Ref Range    Troponin I 0.076 (H) 0.000 - 0.026 ng/mL   B-Type natriuretic peptide (BNP)    Collection Time: 01/07/18  2:48 AM   Result Value Ref Range    BNP 3,004 (H) 0 - 99  pg/mL   Lipase    Collection Time: 01/07/18  2:48 AM   Result Value Ref Range    Lipase 232 (H) 4 - 60 U/L   Lactic acid, plasma    Collection Time: 01/07/18  2:48 AM   Result Value Ref Range    Lactate (Lactic Acid) 1.2 0.5 - 2.2 mmol/L   Protime-INR    Collection Time: 01/07/18  2:48 AM   Result Value Ref Range    Prothrombin Time 10.7 9.0 - 12.5 sec    INR 1.0 0.8 - 1.2   APTT    Collection Time: 01/07/18  2:48 AM   Result Value Ref Range    aPTT 31.1 21.0 - 32.0 sec   Blood culture    Collection Time: 01/07/18  3:00 AM   Result Value Ref Range    Blood Culture, Routine No Growth to date    Urinalysis    Collection Time: 01/07/18  3:13 AM   Result Value Ref Range    Specimen UA Urine, Clean Catch     Color, UA Straw Yellow, Straw, Rosa [place]    Appearance, UA Clear Clear    pH, UA 6.0 5.0 - 8.0    Specific Gravity, UA 1.010 1.005 - 1.030    Protein, UA 2+ (A) Negative    Glucose, UA 2+ (A) Negative    Ketones, UA Negative Negative    Bilirubin (UA) Negative Negative    Occult Blood UA 2+ (A) Negative    Nitrite, UA Negative Negative    Urobilinogen, UA Negative <2.0 EU/dL    Leukocytes, UA Negative Negative   Urinalysis Microscopic    Collection Time: 01/07/18  3:13 AM   Result Value Ref Range    RBC, UA 12 (H) 0 - 4 /hpf    WBC, UA 2 0 - 5 /hpf    Bacteria, UA Rare None-Occ /hpf    Squam Epithel, UA 2 /hpf    Hyaline Casts, UA 0 0-1/lpf /lpf    Microscopic Comment SEE COMMENT    POCT glucose    Collection Time: 01/07/18  5:33 AM   Result Value Ref Range    POCT Glucose 109 70 - 110 mg/dL   ]    I/O last 3 completed shifts:  In: 100 [IV Piggyback:100]  Out: -     Vitals:    01/07/18 1530 01/07/18 1600 01/07/18 1630 01/07/18 1700   BP: (!) 156/90 (!) 154/100 (!) 150/96 (!) 151/95   Pulse: 80 80 85 102   Resp: 18 18 17 18   Temp:  97.8 °F (36.6 °C)     TempSrc:  Oral     SpO2:       Weight:       Height:           No Jvd, Thyromegaly or Lymphadenopathy  Lungs: Fairly clear anteriorly and laterally  Cor: RRR no G or  rubs  Abd: Soft benign good bowel sounds non tender  Ext: No E C C    A)  ESRD on hd  HTN on meds and HD  Hyperk 2nd  To esrd, diet and missing hd  2nd hyperpth on renvela  HIV dont see HIV meds in his home med list. If he is not taking them he might be AIDS  Hemoptysis  Abd pain GIB GI following

## 2018-01-07 NOTE — SUBJECTIVE & OBJECTIVE
Past Medical History:   Diagnosis Date    HIV (human immunodeficiency virus infection)     Hypertension     Kidney failure     Seizure disorder 2016       Past Surgical History:   Procedure Laterality Date    fistula creation Right 01/2016       Review of patient's allergies indicates:   Allergen Reactions    Pcn [penicillins] Itching       No current facility-administered medications on file prior to encounter.      Current Outpatient Prescriptions on File Prior to Encounter   Medication Sig    amlodipine (NORVASC) 10 MG tablet Take 1 tablet (10 mg total) by mouth once daily.    hydrocodone-acetaminophen 5-325mg (NORCO) 5-325 mg per tablet Take 1 tablet by mouth every 4 (four) hours as needed for Pain.    levetiracetam (KEPPRA) 250 MG Tab Take 2 tablets (500 mg total) by mouth 2 (two) times daily.    sevelamer carbonate (RENVELA) 800 mg Tab Take 2 tablets (1,600 mg total) by mouth 3 (three) times daily with meals.     Family History     None        Social History Main Topics    Smoking status: Never Smoker    Smokeless tobacco: Never Used    Alcohol use No    Drug use: No    Sexual activity: Not Currently     Partners: Female     Review of Systems   Constitutional: Positive for appetite change, chills, fatigue and fever.   HENT: Positive for congestion, ear pain and rhinorrhea.    Eyes: Positive for discharge and itching.   Respiratory: Positive for cough, shortness of breath and wheezing.    Cardiovascular: Positive for chest pain.   Gastrointestinal: Positive for abdominal pain, blood in stool and diarrhea.   Musculoskeletal: Positive for arthralgias and myalgias.   Skin: Positive for rash and wound.   Neurological: Positive for weakness and headaches.     Objective:     Vital Signs (Most Recent):  Temp: 97.9 °F (36.6 °C) (01/07/18 1210)  Pulse: 94 (01/07/18 1210)  Resp: 20 (01/07/18 1210)  BP: (!) 164/81 (01/07/18 1210)  SpO2: 100 % (01/07/18 1210) Vital Signs (24h Range):  Temp:  [97.9 °F (36.6  °C)-99.1 °F (37.3 °C)] 97.9 °F (36.6 °C)  Pulse:  [86-98] 94  Resp:  [17-20] 20  SpO2:  [96 %-100 %] 100 %  BP: (164-199)/() 164/81     Weight: 56.6 kg (124 lb 12.5 oz)  Body mass index is 20.14 kg/m².    Physical Exam   Constitutional: He is oriented to person, place, and time. He appears well-developed and well-nourished. No distress.   Chronically ill appearing. Appears older than stated age.   HENT:   Right Ear: External ear normal.   Left Ear: External ear normal.   Nose: Nose normal.   Eyes: EOM are normal. Pupils are equal, round, and reactive to light. No scleral icterus.   Neck: Normal range of motion. Neck supple. No thyromegaly present.   Cardiovascular: Normal rate and normal heart sounds.  Exam reveals no friction rub.    No murmur heard.  Pulmonary/Chest: Effort normal and breath sounds normal. No respiratory distress. He has no wheezes. He has no rales.   Abdominal: Soft. Bowel sounds are normal. He exhibits no mass. There is no tenderness.   Musculoskeletal: Normal range of motion. He exhibits no edema or deformity.   Neurological: He is alert and oriented to person, place, and time. No cranial nerve deficit.   Skin: Skin is warm and dry. No pallor.   Penile shaft lesion without ulceration or tenderness  Left axilla lesion without ulceration, +tender  Lesions in gluteal fold with excoriation.  No erythema or purulence.         CRANIAL NERVES     CN III, IV, VI   Pupils are equal, round, and reactive to light.  Extraocular motions are normal.        Significant Labs: All pertinent labs within the past 24 hours have been reviewed.    Significant Imaging: I have reviewed and interpreted all pertinent imaging results/findings within the past 24 hours.

## 2018-01-07 NOTE — ED PROVIDER NOTES
Encounter Date: 1/6/2018       History     Chief Complaint   Patient presents with    Hemoptysis     reports he started coughing up blood starting 20 mins pta, states coughing started 2 days ago    Abscess     to groin, starting yesterday, hx of sypholisis as well as HIV     Chief complaint: Hemoptysis, abscess    History of present illness: Patient is a 52-year-old male who states that 2 days ago he began to have coughing and then just prior to arrival began have bloody sputum.  He also reports a history of abscesses that began yesterday to the penis, left axilla, upper gluteal fold.  His history is significant for HIV and syphilis as well as hypertension and end-stage renal disease on hemodialysis.  He reports having this his last hemodialysis treatments.  He reports fever which is subjective, and chills, congestion, runny nose, ear pain, eyes watery itchy, cough and wheezing with shortness of breath and chest pain, abdominal pain, diarrhea ×2 episodes today with blood in stool, nausea and vomiting ×2 episodes today, fatigue.  Denies urinary frequency, urgency, hematuria, dysuria.      The history is provided by the patient. No  was used.     Review of patient's allergies indicates:   Allergen Reactions    Pcn [penicillins] Itching     Past Medical History:   Diagnosis Date    Hypertension     Kidney failure     Seizure disorder 2016     Past Surgical History:   Procedure Laterality Date    fistula creation Right 01/2016     History reviewed. No pertinent family history.  Social History   Substance Use Topics    Smoking status: Never Smoker    Smokeless tobacco: Never Used    Alcohol use No     Review of Systems   Constitutional: Positive for chills, fatigue and fever. Negative for appetite change and diaphoresis.   HENT: Positive for congestion, ear pain and rhinorrhea. Negative for ear discharge, postnasal drip, sinus pressure, sneezing, sore throat and voice change.    Eyes:  Positive for discharge and itching. Negative for visual disturbance.   Respiratory: Positive for cough, shortness of breath and wheezing.    Cardiovascular: Positive for chest pain. Negative for palpitations and leg swelling.   Gastrointestinal: Positive for abdominal pain, blood in stool, diarrhea, nausea and vomiting.   Endocrine: Negative for polydipsia, polyphagia and polyuria.   Genitourinary: Negative for difficulty urinating, discharge, dysuria, frequency, hematuria, penile pain, penile swelling and urgency.   Musculoskeletal: Negative for arthralgias and myalgias.   Skin: Negative for rash and wound.   Neurological: Negative for dizziness, seizures, syncope and weakness.   Hematological: Negative for adenopathy. Does not bruise/bleed easily.   Psychiatric/Behavioral: Negative for agitation and self-injury. The patient is not nervous/anxious.        Physical Exam     Initial Vitals [01/06/18 2356]   BP Pulse Resp Temp SpO2   (!) 193/104 89 20 98.7 °F (37.1 °C) 100 %      MAP       133.67         Physical Exam    Nursing note and vitals reviewed.  Constitutional: He appears well-developed and well-nourished. He is not diaphoretic. No distress. He is not intubated.   HENT:   Head: Normocephalic and atraumatic.   Right Ear: External ear normal.   Left Ear: External ear normal.   Nose: Nose normal. No epistaxis.   Mouth/Throat: Oropharynx is clear and moist.   Eyes: Pupils are equal, round, and reactive to light. Right eye exhibits no discharge. Left eye exhibits no discharge. No scleral icterus.   Neck: Normal range of motion.   Cardiovascular: Regular rhythm, S1 normal, S2 normal and normal heart sounds. Exam reveals no gallop.    No murmur heard.  Pulmonary/Chest: Effort normal and breath sounds normal. No accessory muscle usage. No apnea, no tachypnea and no bradypnea. He is not intubated. No respiratory distress. He has no decreased breath sounds. He has no wheezes. He has no rhonchi. He has no rales.    Abdominal: Soft. Normal appearance and bowel sounds are normal. He exhibits no distension. There is generalized tenderness. There is guarding.   Musculoskeletal: Normal range of motion.   Neurological: He is alert and oriented to person, place, and time.   Skin: Skin is dry. Capillary refill takes less than 2 seconds.   Dry, crusted pustules to the mons pubis, left axilla, and gluteal fold.  None with visible redness, warmth or exudate.         ED Course   Critical Care  Date/Time: 1/27/2018 11:33 AM  Performed by: NIKA BENSON  Authorized by: PETE MILNER   Total critical care time (exclusive of procedural time) : 99 minutes  Critical care start time: 1/7/2018 3:42 AM  Critical care end time: 1/7/2018 5:21 AM  Critical care time was exclusive of separately billable procedures and treating other patients and teaching time.  Critical care was necessary to treat or prevent imminent or life-threatening deterioration of the following conditions: metabolic crisis.  Critical care was time spent personally by me on the following activities: evaluation of patient's response to treatment, examination of patient, ordering and review of laboratory studies, re-evaluation of patient's condition and review of old charts.  Subsequent provider of critical care: I assumed direction of critical care for this patient from another provider of my specialty.        Labs Reviewed   CBC W/ AUTO DIFFERENTIAL - Abnormal; Notable for the following:        Result Value    RBC 2.40 (*)     Hemoglobin 7.4 (*)     Hematocrit 22.6 (*)     RDW 15.8 (*)     All other components within normal limits   COMPREHENSIVE METABOLIC PANEL - Abnormal; Notable for the following:     Potassium 6.7 (*)     Chloride 116 (*)     CO2 10 (*)     BUN, Bld 129 (*)     Creatinine 21.6 (*)     Total Protein 9.9 (*)     Albumin 2.9 (*)     ALT 9 (*)     eGFR if  2 (*)     eGFR if non  2 (*)     All other components within  normal limits    Narrative:        Potassium critical result(s) called and verbal readback obtained   from JCARLOS Ayala RN  , 01/07/2018 03:39   TROPONIN I - Abnormal; Notable for the following:     Troponin I 0.076 (*)     All other components within normal limits   B-TYPE NATRIURETIC PEPTIDE - Abnormal; Notable for the following:     BNP 3,004 (*)     All other components within normal limits   LIPASE - Abnormal; Notable for the following:     Lipase 232 (*)     All other components within normal limits   URINALYSIS - Abnormal; Notable for the following:     Protein, UA 2+ (*)     Glucose, UA 2+ (*)     Occult Blood UA 2+ (*)     All other components within normal limits   URINALYSIS MICROSCOPIC - Abnormal; Notable for the following:     RBC, UA 12 (*)     All other components within normal limits   POCT OCCULT BLOOD STOOL - Abnormal; Notable for the following:     Fecal Occult Blood Positive (*)     All other components within normal limits   CULTURE, BLOOD   CULTURE, BLOOD   LACTIC ACID, PLASMA   PROTIME-INR   APTT     EKG Readings: (Independently Interpreted)   Initial Reading: No STEMI. Rhythm: Normal Sinus Rhythm. Heart Rate: 86. Ectopy: No Ectopy.   Other EKG Interpretations: NSR with rate of 93 and no ectopy and no  Acute MI.          Medical Decision Making:   History:   Old Records Summarized: records from clinic visits.  Initial Assessment:   Pt is a 52 y.o. year old male with a history of hemoptysis and skin abscesses.  Physical exam reveals a nontoxic, afebrile male with abdominal guarding and tenderness x4 quadrants, bbs cta, heart: rrr no mcr, no neuro defecits.  Differential Diagnosis:   Differential diagnosis includes but is not limited to appendicitis, bowel obstruction, umbilical or incisional hernia, gastroenteritis, AAA, mesenteric ischemia, aortic dissection, DKA, opiate withdrawal,and herpes zoster, pulmonary embolism, anaphylaxis, asthma, COPD exacerbation, pleural effusion, cardiogenic or  noncardiogenic edema, pneumothorax, pneumonia, congestive heart failure, pericardial effusion, acute coronary syndrome, aortic dissection, pericarditis, pulmonary embolism, pneumothorax, pneumonia, pneumomediastinum, GERD, costochondritis, rib contusion or fracture.   ED Management:  I have ordered bloodwork and my plan is to evaluate results as patient has missed multiple HD treatments.  I will treat his skin problems with topical bactroban and doxycyline.   Care of the patient discussed with Dr. Garcia who agreed with the assessment and plan.                     ED Course as of Jan 07 0522   Sun Jan 07, 2018   0309 Protime: 10.7 [VC]   0309 Coumadin Monitoring INR: 1.0 [VC]   0309 aPTT: 31.1 [VC]   0316 CBC: leukocyte count was normal, the H&H was reduced. The platelet count was normal. This indicates anemia    [VC]   0318 Does not represent anaerobic metabolism as in sepsis. Lactate, Tito: 1.2 [VC]   0328 Known history of ESRD on HD with 2 missed treatments. BNP: (!) 3,004 [VC]   0334 Known renal failure with missed appointments for HD. Troponin I: (!) 0.076 [VC]   0341 No sign of infection in urine. POs for hematuria, glucosuria, proteinuria.  [VC]   0342 Calcium gluconate, albuterol and kayexalate ordered.  Potassium: (!!) 6.7 [VC]   0346 No thoracic noncontrast CT findings to account for the reported history of hemoptysis.    Cardiomegaly with small pericardial effusion.    Indeterminate low-attenuation left renal cortical lesion recommend further characterization with a nonemergent renal ultrasound.No thoracic noncontrast CT findings to account for the reported history of hemoptysis.    Cardiomegaly with small pericardial effusion.    Indeterminate low-attenuation left renal cortical lesion recommend further characterization with a nonemergent renal ultrasound. CT Chest Without Contrast [VC]   0347 Elevated, awaiting ct result. Lipase: (!) 232 [VC]   0349 Pt verbalized willingness to be admitted.  [VC]    0351 BP: (!) 199/90 [VC]   0351 Pulse: 92 [VC]   0351 SpO2: 100 % [VC]   0351 Temp: 98.4 °F (36.9 °C) [VC]   0351 Resp: 18 [VC]   0422 Probable small cystic lesions in the left lobe of the liver and upper pole left kidney.  Followup ultrasound abdominal ultrasound can be performed to further assess.    The pancreas appears enlarged without significant fat stranding or peripancreatic fluid collection identified.  Correlate clinically with lipase/amylase for potential pancreatitis.    Abnormal loops of mildly distended proximal jejunum in the upper abdomen demonstrating circumferential wall thickening, nonspecific, possibly related to enteritis or reactive inflammation to adjacent pancreatitis.  CT Abdomen Pelvis  Without Contrast (!) [VC]   0459 Spoke with Dr. Ryder of nephrology who states that patient should be given bicarbonate and catapres.  Both ordered.  He states patient will be dialyzed at 8am.  [VC]   0521 Sign off report given to Dr. Hughes who accepted the patient for Dr. Justin Champion and requested insulin and D50 for reduction of hyperkalemia.  Ordered.    [VC]      ED Course User Index  [VC] Johan Lui DNP     Clinical Impression:   The primary encounter diagnosis was HIV disease. Diagnoses of Chest pain, BRBPR (bright red blood per rectum), Abscess, Hypervolemia, unspecified hypervolemia type, ESRD (end stage renal disease), Hemodialysis status, Hyperkalemia, Anemia, unspecified type, Hematuria, unspecified type, H/O noncompliance with medical treatment, presenting hazards to health, and Hyperkalemia, diminished renal excretion were also pertinent to this visit.    Disposition:   Disposition: Admitted  Condition: Fair                        Johan Lui DNP  01/07/18 0522       Johan Lui DNP  01/27/18 113

## 2018-01-07 NOTE — ASSESSMENT & PLAN NOTE
Recently diagnosed with genital herpes and started on Acyclovir. Though he says these lesions are new, he is not the greatest historian. Restart acyclovir. Appreciate ID input.

## 2018-01-07 NOTE — ASSESSMENT & PLAN NOTE
Last CD4 of 120/10.4 % 10/9/2017 during inpatient admission.  [facility] records 7/21/17 reviewed not on ART at that time. Dapsone prophylaxis.   States that he has not taken HIV medications since the 80s and is not really interested in taking HIV medications.

## 2018-01-08 ENCOUNTER — ANESTHESIA (OUTPATIENT)
Dept: ENDOSCOPY | Facility: HOSPITAL | Age: 53
DRG: 640 | End: 2018-01-08
Payer: MEDICAID

## 2018-01-08 ENCOUNTER — ANESTHESIA EVENT (OUTPATIENT)
Dept: ENDOSCOPY | Facility: HOSPITAL | Age: 53
DRG: 640 | End: 2018-01-08
Payer: MEDICAID

## 2018-01-08 LAB
ABO + RH BLD: NORMAL
ABSOLUTE CD3: 731 CELLS/UL (ref 700–2100)
ABSOLUTE CD8: 605 CELLS/UL (ref 200–900)
ALBUMIN SERPL BCP-MCNC: 2.5 G/DL
ALP SERPL-CCNC: 52 U/L
ALT SERPL W/O P-5'-P-CCNC: 7 U/L
ANION GAP SERPL CALC-SCNC: 10 MMOL/L
AST SERPL-CCNC: 15 U/L
BASOPHILS # BLD AUTO: 0.01 K/UL
BASOPHILS NFR BLD: 0.5 %
BILIRUB SERPL-MCNC: 0.3 MG/DL
BLD GP AB SCN CELLS X3 SERPL QL: NORMAL
BLD PROD TYP BPU: NORMAL
BLOOD UNIT EXPIRATION DATE: NORMAL
BLOOD UNIT TYPE CODE: 5100
BLOOD UNIT TYPE: NORMAL
BUN SERPL-MCNC: 48 MG/DL
CALCIUM SERPL-MCNC: 8.5 MG/DL
CD3%: 67.7 % (ref 55–83)
CD3+CD4+ CELLS # BLD: 77 CELLS/UL (ref 300–1400)
CD3+CD4+ CELLS NFR BLD: 7.2 % (ref 28–57)
CD4/CD8 RATIO: 0.13 (ref 0.9–3.6)
CD8 % SUPPRESSOR T CELL: 56 % (ref 10–39)
CHLORIDE SERPL-SCNC: 101 MMOL/L
CO2 SERPL-SCNC: 23 MMOL/L
CODING SYSTEM: NORMAL
CREAT SERPL-MCNC: 10 MG/DL
DIFFERENTIAL METHOD: ABNORMAL
DISPENSE STATUS: NORMAL
EOSINOPHIL # BLD AUTO: 0 K/UL
EOSINOPHIL NFR BLD: 1.9 %
ERYTHROCYTE [DISTWIDTH] IN BLOOD BY AUTOMATED COUNT: 15.4 %
EST. GFR  (AFRICAN AMERICAN): 6 ML/MIN/1.73 M^2
EST. GFR  (NON AFRICAN AMERICAN): 5 ML/MIN/1.73 M^2
GLUCOSE SERPL-MCNC: 88 MG/DL
HCT VFR BLD AUTO: 18.9 %
HGB BLD-MCNC: 6.4 G/DL
LYMPHOCYTES # BLD AUTO: 0.4 K/UL
LYMPHOCYTES NFR BLD: 19.2 %
MCH RBC QN AUTO: 30.9 PG
MCHC RBC AUTO-ENTMCNC: 33.9 G/DL
MCV RBC AUTO: 91 FL
MONOCYTES # BLD AUTO: 0.4 K/UL
MONOCYTES NFR BLD: 19.2 %
NEUTROPHILS # BLD AUTO: 1.3 K/UL
NEUTROPHILS NFR BLD: 59.2 %
NUM UNITS TRANS PACKED RBC: NORMAL
PLATELET # BLD AUTO: 189 K/UL
PMV BLD AUTO: 10.3 FL
POTASSIUM SERPL-SCNC: 4.1 MMOL/L
PROT SERPL-MCNC: 8.2 G/DL
RBC # BLD AUTO: 2.07 M/UL
SODIUM SERPL-SCNC: 134 MMOL/L
TRIGL SERPL-MCNC: 80 MG/DL
WBC # BLD AUTO: 2.14 K/UL

## 2018-01-08 PROCEDURE — 25000003 PHARM REV CODE 250: Performed by: INTERNAL MEDICINE

## 2018-01-08 PROCEDURE — 85025 COMPLETE CBC W/AUTO DIFF WBC: CPT

## 2018-01-08 PROCEDURE — 36430 TRANSFUSION BLD/BLD COMPNT: CPT

## 2018-01-08 PROCEDURE — 86920 COMPATIBILITY TEST SPIN: CPT

## 2018-01-08 PROCEDURE — 80053 COMPREHEN METABOLIC PANEL: CPT

## 2018-01-08 PROCEDURE — 84478 ASSAY OF TRIGLYCERIDES: CPT

## 2018-01-08 PROCEDURE — P9038 RBC IRRADIATED: HCPCS

## 2018-01-08 PROCEDURE — 86901 BLOOD TYPING SEROLOGIC RH(D): CPT

## 2018-01-08 PROCEDURE — 25000003 PHARM REV CODE 250: Performed by: NURSE PRACTITIONER

## 2018-01-08 PROCEDURE — 36415 COLL VENOUS BLD VENIPUNCTURE: CPT

## 2018-01-08 PROCEDURE — 21400001 HC TELEMETRY ROOM

## 2018-01-08 RX ORDER — HYDROCODONE BITARTRATE AND ACETAMINOPHEN 500; 5 MG/1; MG/1
TABLET ORAL
Status: DISCONTINUED | OUTPATIENT
Start: 2018-01-08 | End: 2018-01-10 | Stop reason: HOSPADM

## 2018-01-08 RX ADMIN — SEVELAMER CARBONATE 1600 MG: 800 TABLET, FILM COATED ORAL at 01:01

## 2018-01-08 RX ADMIN — LEVETIRACETAM 500 MG: 500 TABLET, FILM COATED ORAL at 02:01

## 2018-01-08 RX ADMIN — ACYCLOVIR 800 MG: 200 CAPSULE ORAL at 05:01

## 2018-01-08 RX ADMIN — DOXYCYCLINE HYCLATE 100 MG: 100 TABLET, COATED ORAL at 09:01

## 2018-01-08 RX ADMIN — ACYCLOVIR 800 MG: 200 CAPSULE ORAL at 09:01

## 2018-01-08 RX ADMIN — DOXYCYCLINE HYCLATE 100 MG: 100 TABLET, COATED ORAL at 01:01

## 2018-01-08 RX ADMIN — AMLODIPINE BESYLATE 10 MG: 5 TABLET ORAL at 01:01

## 2018-01-08 RX ADMIN — MUPIROCIN 22 G: 20 OINTMENT TOPICAL at 05:01

## 2018-01-08 RX ADMIN — MUPIROCIN 22 G: 20 OINTMENT TOPICAL at 02:01

## 2018-01-08 RX ADMIN — SODIUM CHLORIDE 1000 ML: 0.9 INJECTION, SOLUTION INTRAVENOUS at 09:01

## 2018-01-08 RX ADMIN — ACYCLOVIR 800 MG: 200 CAPSULE ORAL at 02:01

## 2018-01-08 RX ADMIN — PANTOPRAZOLE SODIUM 40 MG: 40 TABLET, DELAYED RELEASE ORAL at 01:01

## 2018-01-08 RX ADMIN — MUPIROCIN 22 G: 20 OINTMENT TOPICAL at 09:01

## 2018-01-08 RX ADMIN — FLUOXETINE 10 MG: 10 CAPSULE ORAL at 02:01

## 2018-01-08 RX ADMIN — LEVETIRACETAM 500 MG: 500 TABLET, FILM COATED ORAL at 09:01

## 2018-01-08 NOTE — ANESTHESIA PREPROCEDURE EVALUATION
01/08/2018  Greyson Hudson III is a 52 y.o., male.    Anesthesia Evaluation    I have reviewed the Patient Summary Reports.     I have reviewed the Medications.     Review of Systems  Anesthesia Hx:  No problems with previous Anesthesia    Social:  No Alcohol Use, Non-Smoker    Hematology/Oncology:         -- Anemia: Hematology Comments: HIV+   Cardiovascular:   Hypertension    Renal/:   Chronic Renal Disease, ESRD    Neurological:   Seizures        Physical Exam  General:  Well nourished    Airway/Jaw/Neck:  Airway Findings: Mouth Opening: Normal Tongue: Normal  General Airway Assessment: Adult  Mallampati: II  TM Distance: Normal, at least 6 cm  Jaw/Neck Findings:  Neck ROM: Normal ROM      Dental:  Dental Findings: In tact   Chest/Lungs:  Chest/Lungs Findings: Clear to auscultation, Normal Respiratory Rate     Heart/Vascular:  Heart Findings: Rate: Normal        Mental Status:  Mental Status Findings:  Cooperative, Alert and Oriented         Anesthesia Plan  Type of Anesthesia, risks & benefits discussed:  Anesthesia Type:  general  Patient's Preference:   Intra-op Monitoring Plan: standard ASA monitors  Intra-op Monitoring Plan Comments:   Post Op Pain Control Plan: multimodal analgesia, IV/PO Opioids PRN and per primary service following discharge from PACU  Post Op Pain Control Plan Comments:   Induction:   IV  Beta Blocker:  Patient is not currently on a Beta-Blocker (No further documentation required).       Informed Consent: Patient understands risks and agrees with Anesthesia plan.  Questions answered. Anesthesia consent signed with patient.  ASA Score: 3     Day of Surgery Review of History & Physical:    H&P update referred to the surgeon.         Ready For Surgery From Anesthesia Perspective.

## 2018-01-08 NOTE — HOSPITAL COURSE
In the ER he was found to have hyperkalemia. No peaked T waves. Nephrology was consulted. He was started on doxycycline for sinusitis and acyclovir for genital herpes. He was also found to have +FOBT and GI was consulted. EGD planned 1/8/18 however he was anemic, so EGD postponed and he was transfused 1 unit RBC. H&H improved appropriately and remained stable. He underwent EGD 1/9 that showed mild gastritis. No bleed source. Biopsy pending. PPI started. No further bleeding. He will need Cscope as outpatient.   He had an enlarged pancreas on imaging. The etiology of this is unclear. Possibly related to HIV or meds? The patient denies any new medications or alcohol use.  A triglyceride level is normal.  He was tolerating a solid diet and stable for discharge with outpatient follow up.

## 2018-01-08 NOTE — PROGRESS NOTES
[facility] Hospital Medicine  Progress Note    Patient Name: Greyson Hudson III  MRN: [id number]  Patient Class: IP- Inpatient   Admission Date: 1/7/2018  Length of Stay: 1 days  Attending Physician: Freida Moore MD  Primary Care Provider: Primary Doctor No        Subjective:     Principal Problem:Hyperkalemia, diminished renal excretion    HPI:  Mr. Hudson is a 51 yo man with ESRD on dialysis TTS, non-compliance, MDD, HTN, seizure disorder, HIV not on ART CD4 120 in 10/2017, hx syphilis, anemia in chronic kidney disease, 2/2 hyperparathyroidism, hyperphosphatemia, hx UGIB, julius-wilson tear, and stroke who presented with multiple complaints and was found to have a potassium of 6.7. He reports cough x2 days and streaky hemoptysis 20 min prior to calling EMS. He complains of generalized weakness, abdominal cramps, chest pain, and intermittent shortness of breath that are chronic. He has lesions on his penis, left axilla, and gluteal fold that he reports started 2 day ago. He also reports recent diarrhea with blood in his stool, subjective fever/chills, congestion with rhinorrhea, and cough. He has been noncompliant with HD and cannot tell me the last time he attended dialysis. He cannot tell me the name of any of his medications or how many he takes. He cannot tell me the name of any of his doctors; he just knows he usually goes to [facility].    Hospital Course:  In the ER he was found to have hyperkalemia. No peaked T waves. Nephrology was consulted. He was started on doxycycline for sinusitis and acyclovir for herpes. He was also found to have +FOBT and GI was consulted. EGD planned 1/8/18 however he was anemic, so EGD postponed and he was transfused 1 unit RBC.       Interval History:  H&H dropped    Review of Systems   Constitutional: Positive for appetite change, chills, fatigue and fever.   HENT: Positive for congestion, ear pain and rhinorrhea.    Eyes: Positive for discharge and itching.    Respiratory: Positive for cough, shortness of breath and wheezing.    Cardiovascular: Positive for chest pain.   Gastrointestinal: Positive for abdominal pain, blood in stool and diarrhea.   Musculoskeletal: Positive for arthralgias and myalgias.   Skin: Positive for rash and wound.   Neurological: Positive for weakness and headaches.     Objective:     Vital Signs (Most Recent):  Temp: 98.7 °F (37.1 °C) (01/08/18 1610)  Pulse: 88 (01/08/18 1610)  Resp: 18 (01/08/18 1610)  BP: 134/73 (01/08/18 1610)  SpO2: 100 % (01/08/18 1610) Vital Signs (24h Range):  Temp:  [98 °F (36.7 °C)-99.3 °F (37.4 °C)] 98.7 °F (37.1 °C)  Pulse:  [] 88  Resp:  [16-18] 18  SpO2:  [97 %-100 %] 100 %  BP: (122-158)/(61-96) 134/73     Weight: 55.8 kg (123 lb 0.3 oz)  Body mass index is 19.86 kg/m².    Physical Exam   Constitutional: He is oriented to person, place, and time. He appears well-developed and well-nourished. No distress.   Chronically ill appearing. Appears older than stated age.   HENT:   Right Ear: External ear normal.   Left Ear: External ear normal.   Nose: Nose normal.   Eyes: EOM are normal. Pupils are equal, round, and reactive to light. No scleral icterus.   Neck: Normal range of motion. Neck supple. No thyromegaly present.   Cardiovascular: Normal rate and normal heart sounds.  Exam reveals no friction rub.    No murmur heard.  Pulmonary/Chest: Effort normal and breath sounds normal. No respiratory distress. He has no wheezes. He has no rales.   Abdominal: Soft. Bowel sounds are normal. He exhibits no mass. There is no tenderness.   Musculoskeletal: Normal range of motion. He exhibits no edema or deformity.   Neurological: He is alert and oriented to person, place, and time. No cranial nerve deficit.   Skin: Skin is warm and dry. No pallor.   Penile shaft lesion without ulceration or tenderness  Left axilla lesion without ulceration, +tender  Lesions in gluteal fold with excoriation.  No erythema or purulence.          CRANIAL NERVES     CN III, IV, VI   Pupils are equal, round, and reactive to light.  Extraocular motions are normal.        Significant Labs: All pertinent labs within the past 24 hours have been reviewed.    Significant Imaging: I have reviewed and interpreted all pertinent imaging results/findings within the past 24 hours.    Assessment/Plan:      * Hyperkalemia, diminished renal excretion    Given Kayexalate, D5/insulin in the ER. Nephrology consulted for HD. Resolved. Monitor.         GI bleed    He has a history of UGIB with Karen-Ferreira tear diagnosed at [facility]. +FOBT. Started on PPI. Appreciate GI recs. EGD 1/8/2017 postponed as hgb dropped to 6.4. Transfuse and monitor. VSS.        Skin lesion    Recently diagnosed with genital herpes and started on Acyclovir. Though he says these lesions are new, he is not the greatest historian. Restart acyclovir.         Acute sinusitis    Started on Doxycycline in the ER. No fever or leukocytosis. Symptomatic treatment. On exam he exhibits no signs of sinusitis.        Seizure    Unspecified seizure disorder. Started on home keppra.        HIV disease    Last CD4 of 120/10.4 % 10/9/2017 during inpatient admission.  [facility] records 7/21/17 reviewed not on ART at that time. Dapsone prophylaxis.   States that he has not taken HIV medications since the 80s and is not really interested in taking HIV medications.        Anemia of chronic renal failure, stage 5    CBC 10/10/17  Hemoglobin 8.0   Hematocrit 23.6   MCV 90.8         Essential hypertension    Suspect uncontrolled due to non-compliance with HD. Does not appear to have medication for HTN based on [facility] records. Amlodipine started in ER. PRN.        ESRD on dialysis    Non-complaint with HD.   Dialysis schedule Tues/Thurs/Sat at [facility].   Multiple admissions for emergent dialysis.  Reports that he has issues with his transportation.   He has a  through [facility].  [email or web address]          VTE Risk Mitigation         Ordered     Medium Risk of VTE  Once      01/07/18 0843     Reason for no Mechanical VTE Prophylaxis  Once      01/07/18 0843              Freida Moore MD  Department of Hospital Medicine   [facility]

## 2018-01-08 NOTE — PROGRESS NOTES
The patient presented for an EGD.  Although he states he did not have bleeding overnight, his H/H dropped and he needs to be transfused pRBCs, so his EGD is being rescheduled for tomorrow.  He reports he still has generalized abdominal pain.    Vitals:    01/07/18 2000 01/08/18 0030 01/08/18 0452 01/08/18 0903   BP:  126/61 132/74    BP Location:  Left arm Left arm    Patient Position:  Lying Lying    Pulse: 81 99 95 88   Resp:  18 18    Temp:  99.2 °F (37.3 °C) 99.2 °F (37.3 °C)    TempSrc:  Oral Oral    SpO2:  100% 97% 100%   Weight:   55.8 kg (123 lb 0.3 oz)    Height:          sodium chloride 0.9%   Intravenous Once    sodium chloride 0.9%   Intravenous Once    acyclovir  800 mg Oral TID    amLODIPine  10 mg Oral Daily    doxycycline  100 mg Oral Q12H    FLUoxetine  10 mg Oral Daily    levETIRAcetam  500 mg Oral BID    mupirocin  1 Tube Topical (Top) TID    pantoprazole  40 mg Oral Daily    sevelamer carbonate  1,600 mg Oral TID WM     P.E.:  GEN: A x O x 3, NAD  HEENT: EOMI, PERRL, anicteric sclera  CV: RRR, no M/R/G  Chest: CTA B  Abdomen: soft, tender, nondistended, normoactive BS  Ext: No C/C/E. 2+ dorsalis pedis pulses B    Labs:  Lab Results   Component Value Date    WBC 2.14 (L) 01/08/2018    HGB 6.4 (L) 01/08/2018    HCT 18.9 (LL) 01/08/2018    MCV 91 01/08/2018     01/08/2018     CMP  Sodium   Date Value Ref Range Status   01/08/2018 134 (L) 136 - 145 mmol/L Final     Potassium   Date Value Ref Range Status   01/08/2018 4.1 3.5 - 5.1 mmol/L Final     Chloride   Date Value Ref Range Status   01/08/2018 101 95 - 110 mmol/L Final     CO2   Date Value Ref Range Status   01/08/2018 23 23 - 29 mmol/L Final     Glucose   Date Value Ref Range Status   01/08/2018 88 70 - 110 mg/dL Final     BUN, Bld   Date Value Ref Range Status   01/08/2018 48 (H) 6 - 20 mg/dL Final     Creatinine   Date Value Ref Range Status   01/08/2018 10.0 (H) 0.5 - 1.4 mg/dL Final     Calcium   Date Value Ref Range Status    01/08/2018 8.5 (L) 8.7 - 10.5 mg/dL Final     Total Protein   Date Value Ref Range Status   01/08/2018 8.2 6.0 - 8.4 g/dL Final     Albumin   Date Value Ref Range Status   01/08/2018 2.5 (L) 3.5 - 5.2 g/dL Final     Total Bilirubin   Date Value Ref Range Status   01/08/2018 0.3 0.1 - 1.0 mg/dL Final     Comment:     For infants and newborns, interpretation of results should be based  on gestational age, weight and in agreement with clinical  observations.  Premature Infant recommended reference ranges:  Up to 24 hours.............<8.0 mg/dL  Up to 48 hours............<12.0 mg/dL  3-5 days..................<15.0 mg/dL  6-29 days.................<15.0 mg/dL       Alkaline Phosphatase   Date Value Ref Range Status   01/08/2018 52 (L) 55 - 135 U/L Final     AST   Date Value Ref Range Status   01/08/2018 15 10 - 40 U/L Final     ALT   Date Value Ref Range Status   01/08/2018 7 (L) 10 - 44 U/L Final     Anion Gap   Date Value Ref Range Status   01/08/2018 10 8 - 16 mmol/L Final     eGFR if    Date Value Ref Range Status   01/08/2018 6 (A) >60 mL/min/1.73 m^2 Final     eGFR if non    Date Value Ref Range Status   01/08/2018 5 (A) >60 mL/min/1.73 m^2 Final     Comment:     Calculation used to obtain the estimated glomerular filtration  rate (eGFR) is the CKD-EPI equation.          No results for input(s): PT, INR, APTT in the last 24 hours.    CT of Abdomen/Pelvis:    Probable small cystic lesions in the left lobe of the liver and upper pole left kidney.  Followup ultrasound abdominal ultrasound can be performed to further assess.    The pancreas appears enlarged without significant fat stranding or peripancreatic fluid collection identified.  Correlate clinically with lipase/amylase for potential pancreatitis.    Abnormal loops of mildly distended proximal jejunum in the upper abdomen demonstrating circumferential wall thickening, nonspecific, possibly related to enteritis or reactive  inflammation to adjacent pancreatitis.      A/P:  The patient is a 52 year old man with a history of HTN, ESRD, HIV, syphilis, and seizures presenting abscesses, hemoptysis, rectal bleeding, and pancreatitis.  1.  GI Bleed - he had hemoptysis then had rectal bleeding.  It is unclear if he had true hematochezia or if he swallowed blood after having hemoptysis.  He denies having bleeding overnight.  Protonix can be continued.  An EGD was planned today, however, his H/H decreased and he is being transfused pRBCs.  An EGD can be performed tomorrow if his H/H is stable.  He will eventually benefit from a colonoscopy. This can be performed in the outpatient setting if he has no further bleeding.  2.  Pancreatitis - the etiology of this is unclear.  He has HIV.  The patient denies any new medications or alcohol use.  A triglyceride level and an ultrasound will be checked.  He wishes to try a clear liquid diet.    The patient was discussed with Dr. Moore.

## 2018-01-08 NOTE — SUBJECTIVE & OBJECTIVE
Interval History:  H&H dropped    Review of Systems   Constitutional: Positive for appetite change, chills, fatigue and fever.   HENT: Positive for congestion, ear pain and rhinorrhea.    Eyes: Positive for discharge and itching.   Respiratory: Positive for cough, shortness of breath and wheezing.    Cardiovascular: Positive for chest pain.   Gastrointestinal: Positive for abdominal pain, blood in stool and diarrhea.   Musculoskeletal: Positive for arthralgias and myalgias.   Skin: Positive for rash and wound.   Neurological: Positive for weakness and headaches.     Objective:     Vital Signs (Most Recent):  Temp: 98.7 °F (37.1 °C) (01/08/18 1610)  Pulse: 88 (01/08/18 1610)  Resp: 18 (01/08/18 1610)  BP: 134/73 (01/08/18 1610)  SpO2: 100 % (01/08/18 1610) Vital Signs (24h Range):  Temp:  [98 °F (36.7 °C)-99.3 °F (37.4 °C)] 98.7 °F (37.1 °C)  Pulse:  [] 88  Resp:  [16-18] 18  SpO2:  [97 %-100 %] 100 %  BP: (122-158)/(61-96) 134/73     Weight: 55.8 kg (123 lb 0.3 oz)  Body mass index is 19.86 kg/m².    Physical Exam   Constitutional: He is oriented to person, place, and time. He appears well-developed and well-nourished. No distress.   Chronically ill appearing. Appears older than stated age.   HENT:   Right Ear: External ear normal.   Left Ear: External ear normal.   Nose: Nose normal.   Eyes: EOM are normal. Pupils are equal, round, and reactive to light. No scleral icterus.   Neck: Normal range of motion. Neck supple. No thyromegaly present.   Cardiovascular: Normal rate and normal heart sounds.  Exam reveals no friction rub.    No murmur heard.  Pulmonary/Chest: Effort normal and breath sounds normal. No respiratory distress. He has no wheezes. He has no rales.   Abdominal: Soft. Bowel sounds are normal. He exhibits no mass. There is no tenderness.   Musculoskeletal: Normal range of motion. He exhibits no edema or deformity.   Neurological: He is alert and oriented to person, place, and time. No cranial  nerve deficit.   Skin: Skin is warm and dry. No pallor.   Penile shaft lesion without ulceration or tenderness  Left axilla lesion without ulceration, +tender  Lesions in gluteal fold with excoriation.  No erythema or purulence.         CRANIAL NERVES     CN III, IV, VI   Pupils are equal, round, and reactive to light.  Extraocular motions are normal.        Significant Labs: All pertinent labs within the past 24 hours have been reviewed.    Significant Imaging: I have reviewed and interpreted all pertinent imaging results/findings within the past 24 hours.

## 2018-01-08 NOTE — PLAN OF CARE
01/07/18 1001   Discharge Assessment   Assessment Type Discharge Planning Assessment   Patient/Family In Agreement With Plan other (see comments)  (Attempted d/c planning assessment)

## 2018-01-08 NOTE — OR NURSING
PROCEDURE ABORTED; LOW H&H. WILL RETURN TO ROOM 313B. DR. HERMOSILLO SPOKE TO MR. GARCIA AND EXPLAINED THE NEED TO RECEIVE BLOOD PRIOR TO HAVING EGD.

## 2018-01-08 NOTE — ASSESSMENT & PLAN NOTE
He has a history of UGIB with Karen-Ferreira tear diagnosed at [facility]. +FOBT. Started on PPI. Appreciate GI recs. EGD 1/8/2017 postponed as hgb dropped to 6.4. Transfuse and monitor. VSS.

## 2018-01-08 NOTE — ASSESSMENT & PLAN NOTE
Recently diagnosed with genital herpes and started on Acyclovir. Though he says these lesions are new, he is not the greatest historian. Restart acyclovir.

## 2018-01-09 ENCOUNTER — ANESTHESIA EVENT (OUTPATIENT)
Dept: ENDOSCOPY | Facility: HOSPITAL | Age: 53
DRG: 640 | End: 2018-01-09
Payer: MEDICAID

## 2018-01-09 ENCOUNTER — ANESTHESIA (OUTPATIENT)
Dept: ENDOSCOPY | Facility: HOSPITAL | Age: 53
DRG: 640 | End: 2018-01-09
Payer: MEDICAID

## 2018-01-09 LAB
ALBUMIN SERPL BCP-MCNC: 2.6 G/DL
ALP SERPL-CCNC: 52 U/L
ALT SERPL W/O P-5'-P-CCNC: 8 U/L
ANION GAP SERPL CALC-SCNC: 11 MMOL/L
AST SERPL-CCNC: 16 U/L
BASOPHILS # BLD AUTO: 0.01 K/UL
BASOPHILS NFR BLD: 0.3 %
BILIRUB SERPL-MCNC: 0.6 MG/DL
BUN SERPL-MCNC: 66 MG/DL
CALCIUM SERPL-MCNC: 8.6 MG/DL
CHLORIDE SERPL-SCNC: 105 MMOL/L
CO2 SERPL-SCNC: 21 MMOL/L
CREAT SERPL-MCNC: 12.4 MG/DL
DIFFERENTIAL METHOD: ABNORMAL
EOSINOPHIL # BLD AUTO: 0.1 K/UL
EOSINOPHIL NFR BLD: 1.5 %
ERYTHROCYTE [DISTWIDTH] IN BLOOD BY AUTOMATED COUNT: 15.3 %
EST. GFR  (AFRICAN AMERICAN): 5 ML/MIN/1.73 M^2
EST. GFR  (NON AFRICAN AMERICAN): 4 ML/MIN/1.73 M^2
GLUCOSE SERPL-MCNC: 86 MG/DL
HCT VFR BLD AUTO: 23.4 %
HGB BLD-MCNC: 7.9 G/DL
HIV UQ DATE RECEIVED: ABNORMAL
HIV UQ DATE REPORTED: ABNORMAL
HIV1 RNA # SERPL NAA+PROBE: ABNORMAL COPIES/ML
HIV1 RNA SERPL NAA+PROBE-LOG#: 4.62 LOG (10) COPIES/ML
HIV1 RNA SERPL QL NAA+PROBE: DETECTED
LYMPHOCYTES # BLD AUTO: 0.9 K/UL
LYMPHOCYTES NFR BLD: 27.9 %
MCH RBC QN AUTO: 30.7 PG
MCHC RBC AUTO-ENTMCNC: 33.8 G/DL
MCV RBC AUTO: 91 FL
MONOCYTES # BLD AUTO: 0.6 K/UL
MONOCYTES NFR BLD: 17.6 %
NEUTROPHILS # BLD AUTO: 1.7 K/UL
NEUTROPHILS NFR BLD: 52.7 %
PLATELET # BLD AUTO: 202 K/UL
PMV BLD AUTO: 10.6 FL
POTASSIUM SERPL-SCNC: 4.9 MMOL/L
PROT SERPL-MCNC: 8.7 G/DL
RBC # BLD AUTO: 2.57 M/UL
SODIUM SERPL-SCNC: 137 MMOL/L
TRIGL SERPL-MCNC: 106 MG/DL
WBC # BLD AUTO: 3.3 K/UL

## 2018-01-09 PROCEDURE — 37000008 HC ANESTHESIA 1ST 15 MINUTES: Performed by: INTERNAL MEDICINE

## 2018-01-09 PROCEDURE — 43239 EGD BIOPSY SINGLE/MULTIPLE: CPT | Performed by: INTERNAL MEDICINE

## 2018-01-09 PROCEDURE — 21400001 HC TELEMETRY ROOM

## 2018-01-09 PROCEDURE — 25000003 PHARM REV CODE 250: Performed by: INTERNAL MEDICINE

## 2018-01-09 PROCEDURE — 27201012 HC FORCEPS, HOT/COLD, DISP: Performed by: INTERNAL MEDICINE

## 2018-01-09 PROCEDURE — 85025 COMPLETE CBC W/AUTO DIFF WBC: CPT

## 2018-01-09 PROCEDURE — 88305 TISSUE EXAM BY PATHOLOGIST: CPT | Performed by: PATHOLOGY

## 2018-01-09 PROCEDURE — 88305 TISSUE EXAM BY PATHOLOGIST: CPT | Mod: 26,,, | Performed by: PATHOLOGY

## 2018-01-09 PROCEDURE — 86706 HEP B SURFACE ANTIBODY: CPT

## 2018-01-09 PROCEDURE — D9220A PRA ANESTHESIA: Mod: CRNA,,, | Performed by: NURSE ANESTHETIST, CERTIFIED REGISTERED

## 2018-01-09 PROCEDURE — 84478 ASSAY OF TRIGLYCERIDES: CPT

## 2018-01-09 PROCEDURE — D9220A PRA ANESTHESIA: Mod: ANES,,, | Performed by: ANESTHESIOLOGY

## 2018-01-09 PROCEDURE — 36415 COLL VENOUS BLD VENIPUNCTURE: CPT

## 2018-01-09 PROCEDURE — 25000003 PHARM REV CODE 250: Performed by: NURSE PRACTITIONER

## 2018-01-09 PROCEDURE — 37000009 HC ANESTHESIA EA ADD 15 MINS: Performed by: INTERNAL MEDICINE

## 2018-01-09 PROCEDURE — 87340 HEPATITIS B SURFACE AG IA: CPT

## 2018-01-09 PROCEDURE — 80053 COMPREHEN METABOLIC PANEL: CPT

## 2018-01-09 PROCEDURE — 0DB68ZX EXCISION OF STOMACH, VIA NATURAL OR ARTIFICIAL OPENING ENDOSCOPIC, DIAGNOSTIC: ICD-10-PCS | Performed by: INTERNAL MEDICINE

## 2018-01-09 PROCEDURE — 90935 HEMODIALYSIS ONE EVALUATION: CPT

## 2018-01-09 PROCEDURE — 63600175 PHARM REV CODE 636 W HCPCS: Performed by: NURSE ANESTHETIST, CERTIFIED REGISTERED

## 2018-01-09 RX ORDER — PROPOFOL 10 MG/ML
VIAL (ML) INTRAVENOUS
Status: DISPENSED
Start: 2018-01-09 | End: 2018-01-10

## 2018-01-09 RX ORDER — LIDOCAINE HYDROCHLORIDE 20 MG/ML
INJECTION, SOLUTION EPIDURAL; INFILTRATION; INTRACAUDAL; PERINEURAL
Status: DISPENSED
Start: 2018-01-09 | End: 2018-01-10

## 2018-01-09 RX ORDER — PROPOFOL 10 MG/ML
VIAL (ML) INTRAVENOUS
Status: DISCONTINUED | OUTPATIENT
Start: 2018-01-09 | End: 2018-01-09

## 2018-01-09 RX ORDER — LIDOCAINE HCL/PF 100 MG/5ML
SYRINGE (ML) INTRAVENOUS
Status: DISCONTINUED | OUTPATIENT
Start: 2018-01-09 | End: 2018-01-09

## 2018-01-09 RX ORDER — SODIUM CHLORIDE 9 MG/ML
INJECTION, SOLUTION INTRAVENOUS CONTINUOUS
Status: DISCONTINUED | OUTPATIENT
Start: 2018-01-09 | End: 2018-01-10 | Stop reason: HOSPADM

## 2018-01-09 RX ADMIN — ACYCLOVIR 800 MG: 200 CAPSULE ORAL at 06:01

## 2018-01-09 RX ADMIN — SODIUM CHLORIDE: 0.9 INJECTION, SOLUTION INTRAVENOUS at 12:01

## 2018-01-09 RX ADMIN — LEVETIRACETAM 500 MG: 500 TABLET, FILM COATED ORAL at 10:01

## 2018-01-09 RX ADMIN — DOXYCYCLINE HYCLATE 100 MG: 100 TABLET, COATED ORAL at 10:01

## 2018-01-09 RX ADMIN — ACYCLOVIR 800 MG: 200 CAPSULE ORAL at 10:01

## 2018-01-09 RX ADMIN — FLUOXETINE 10 MG: 10 CAPSULE ORAL at 03:01

## 2018-01-09 RX ADMIN — LIDOCAINE HYDROCHLORIDE 100 MG: 20 INJECTION, SOLUTION INTRAVENOUS at 01:01

## 2018-01-09 RX ADMIN — MUPIROCIN 22 G: 20 OINTMENT TOPICAL at 10:01

## 2018-01-09 RX ADMIN — MUPIROCIN 22 G: 20 OINTMENT TOPICAL at 06:01

## 2018-01-09 RX ADMIN — AMLODIPINE BESYLATE 10 MG: 5 TABLET ORAL at 03:01

## 2018-01-09 RX ADMIN — PANTOPRAZOLE SODIUM 40 MG: 40 TABLET, DELAYED RELEASE ORAL at 03:01

## 2018-01-09 RX ADMIN — MUPIROCIN 22 G: 20 OINTMENT TOPICAL at 03:01

## 2018-01-09 RX ADMIN — LEVETIRACETAM 500 MG: 500 TABLET, FILM COATED ORAL at 03:01

## 2018-01-09 RX ADMIN — PROPOFOL 40 MG: 10 INJECTION, EMULSION INTRAVENOUS at 01:01

## 2018-01-09 RX ADMIN — DOXYCYCLINE HYCLATE 100 MG: 100 TABLET, COATED ORAL at 03:01

## 2018-01-09 RX ADMIN — HYDROCODONE BITARTRATE AND ACETAMINOPHEN 1 TABLET: 10; 325 TABLET ORAL at 04:01

## 2018-01-09 RX ADMIN — PROPOFOL 60 MG: 10 INJECTION, EMULSION INTRAVENOUS at 01:01

## 2018-01-09 RX ADMIN — ACYCLOVIR 800 MG: 200 CAPSULE ORAL at 03:01

## 2018-01-09 NOTE — PLAN OF CARE
Problem: Infection, Risk/Actual (Adult)  Intervention: Manage Suspected/Actual Infection   01/09/18 1018   Safety Interventions   Isolation Precautions environmental surveillance     Intervention: Prevent Infection/Maximize Resistance   01/09/18 1018   Respiratory Interventions   Airway/Ventilation Management airway patency maintained   Nutrition Interventions   Oral Nutrition Promotion calorie dense foods provided       Goal: Identify Related Risk Factors and Signs and Symptoms  Related risk factors and signs and symptoms are identified upon initiation of Human Response Clinical Practice Guideline (CPG)   Outcome: Ongoing (interventions implemented as appropriate)   01/09/18 0723   Infection, Risk/Actual   Related Risk Factors (Infection, Risk/Actual) chronic illness/condition;poor personal hygiene;immunosuppressed;malnutrition;sleep disturbance;surgery/procedure   Signs and Symptoms (Infection, Risk/Actual) malaise;pain     Goal: Infection Prevention/Resolution  Patient will demonstrate the desired outcomes by discharge/transition of care.   Outcome: Ongoing (interventions implemented as appropriate)   01/09/18 0723   Infection, Risk/Actual (Adult)   Infection Prevention/Resolution making progress toward outcome       Problem: [facility] (Adult,Obstetrics,Pediatric)  Intervention: Foster Nettleton   01/09/18 1018   Coping/Psychosocial Interventions   Supportive Measures active listening utilized   Psychosocial Support   Family/Support System Care caregiver stress acknowledged       Goal: Identify Related Risk Factors and Signs and Symptoms  Related risk factors and signs and symptoms are identified upon initiation of Human Response Clinical Practice Guideline (CPG)  Outcome: Ongoing (interventions implemented as appropriate)   01/09/18 1018   [facility]   Related Risk Factors ([facility]) coping skills ineffective   Signs and Symptoms ([facility]) altered problem-solving skills;hostility/resistance;lack of  lifestyle change;questions need for more treatment;unused/overused medications     Goal: Collaboration with Healthcare Team  Patient will demonstrate the desired outcomes by discharge/transition of care.  Outcome: Ongoing (interventions implemented as appropriate)   01/09/18 1018   [facility] (Adult,Obstetrics,Pediatric)   Collaboration with Healthcare Team unable to achieve outcome     Goal: Increased Sense of Empowerment/Autonomy  Patient will demonstrate the desired outcomes by discharge/transition of care.  Outcome: Ongoing (interventions implemented as appropriate)   01/09/18 1018   [facility] (Adult,Obstetrics,Pediatric)   Increased Sense of Empowerment/Autonomy making progress toward outcome

## 2018-01-09 NOTE — TRANSFER OF CARE
"Anesthesia Transfer of Care Note    Patient: Greyson Hudson III    Procedure(s) Performed: Procedure(s) (LRB):  ESOPHAGOGASTRODUODENOSCOPY (EGD) (Left)    Patient location: GI    Anesthesia Type: general    Transport from OR: Transported from OR on room air with adequate spontaneous ventilation    Post pain: adequate analgesia    Post assessment: no apparent anesthetic complications    Post vital signs: stable    Level of consciousness: sedated and awake    Nausea/Vomiting: no nausea/vomiting    Complications: none    Transfer of care protocol was followed      Last vitals:   Visit Vitals  /83 (BP Location: Left arm, Patient Position: Lying)   Pulse 100   Temp 37.1 °C (98.8 °F) (Oral)   Resp 12   Ht 5' 6" (1.676 m)   Wt 55.8 kg (123 lb 0.3 oz)   SpO2 95%   BMI 19.86 kg/m²     "

## 2018-01-09 NOTE — PLAN OF CARE
Problem: Infection, Risk/Actual (Adult)  Goal: Identify Related Risk Factors and Signs and Symptoms  Related risk factors and signs and symptoms are identified upon initiation of Human Response Clinical Practice Guideline (CPG)   Outcome: Ongoing (interventions implemented as appropriate)   01/09/18 0723   Infection, Risk/Actual   Related Risk Factors (Infection, Risk/Actual) chronic illness/condition;poor personal hygiene;immunosuppressed;malnutrition;sleep disturbance;surgery/procedure   Signs and Symptoms (Infection, Risk/Actual) malaise;pain     Goal: Infection Prevention/Resolution  Patient will demonstrate the desired outcomes by discharge/transition of care.   Outcome: Ongoing (interventions implemented as appropriate)   01/09/18 0723   Infection, Risk/Actual (Adult)   Infection Prevention/Resolution making progress toward outcome

## 2018-01-09 NOTE — PROGRESS NOTES
Call placed to 24 hour dialysis number at [phone]. Spoke with TERESSA Odom. He is aware of pts dialysis order and was waiting for pt to be transferred from [facility]. Pt will be transported to dialysis today.

## 2018-01-09 NOTE — NURSING
Report received by TERESSA Payne. The pt is lying in bed watching tv. He is not in any pain or discomfort. Tele monitor in progress with alarms set. Safety precautions maintained and bed locked in lowest position. Side rails up X2. Call bell and personal items within reach.

## 2018-01-09 NOTE — PROGRESS NOTES
Patient to scheduled endo procedure as ordered. Patient awake, alert, oriented without c/o discomfort at this time. No apparent distress noted.

## 2018-01-09 NOTE — PROGRESS NOTES
Contacted by Herb in dialysis and he informed me that he is currently in the ICU with patient's who need dialysis and he will get to  after he has completed the ICU dialysis sessions.

## 2018-01-09 NOTE — CONSULTS
DATE OF CONSULTATION:  01/07/2018.    CONSULTING PHYSICIAN:  Dr. Moore.    INDICATIONS FOR THE CONSULTATION:  Anemia, abdominal pain and GI bleeding    CLINICAL SUMMARY:  This is a 52-year-old gentleman with a history of   hypertension, end-stage renal disease and HIV disease, who was doing well until   yesterday.  The patient developed acute abdominal pain and had some blood in the   stool.  Consultation was made for evaluation of the same.  The patient tells me   he is having some hemoptysis, coughing up some blood and really throwing up any   blood.  He does have abdominal pain.  He had one bowel movement, which was some   blood in it.  The patient denies of having any melena.  The patient has no   dysphagia.  The patient has no nausea or vomiting at the present time.    PAST MEDICAL HISTORY:  Pertinent for hypertension, HIV and end-stage renal   disease, on dialysis.    PAST SURGICAL HISTORY:  Negative.    MEDICATIONS AT HOME:  Include amlodipine 10 mg p.o. every day, dicyclomine 10 mg   p.o. q.12h.   He is on Levitra 500 mg p.o. b.i.d.  He is on mupirocin ointment   to the eyes, pantoprazole 40 mg p.o. every day.  He is also on sevelamer 1600 mg   p.o. t.i.d.  He is on Zofran as needed basis.    ALLERGIES:  ALLERGIC FOR PENICILLIN.    SOCIAL HISTORY:  He is not , has one child.  Does drink alcohol, does not   smoke.    FAMILY HISTORY:  Pertinent for hypertension, diabetes mellitus in the family.    REVIEW OF SYSTEMS:  He denies of having any headache, any problem with his eyes,   nose and throat, does complain of generalized body pain.  Denies of having any   chest pain, shortness of breath, cough and congestion.  The patient denies of   having any significant abdominal pain at the present time.  He does have some   rectal bleeding.  Denies of having any melena.  He denies of having any problem   with his extremities.  Neurologically, he does not have any neurological   symptoms at the present  time.    PHYSICAL EXAMINATION:  GENERAL:  This is an adult gentleman under no apparent distress.  He is   afebrile.  VITAL SIGNS:  Temperature of 97.8, pulse 86, respirations 17, blood pressure   164/80.  He has a pulse ox of 96% on room air.  HEENT:  He has no icterus.  No JVD, no bruits.  HEART:  Regular rate and rhythm.  LUNGS:  Clear.  ABDOMEN:  Soft, positive bowel sounds.  Epigastric tenderness.  No rebound or   guarding.  EXTREMITIES:  No edema was noted.    LABORATORY EVALUATION:  Revealed white count 4000, H and H of 7.4 and 22.6, his   platelet count of 236.  PT 10.7, INR 1.1, PTT within normal limits.  His   potassium 6.7, sodium 141.  His BUN and creatinine 129 and 21.6.  His glucose   88, albumin 2.9, lipase 232.  AST and ALT within normal limits.  Troponin is   normal.  Lactate normal.  Urinalysis showed 12 rbc's, otherwise unremarkable.    The patient has had a CT scan of the abdomen done, which showed small cyst on   the left lobe of the liver and on left kidney, pancreas appears enlarged without   any significant inflammation.  The abnormal loops of jejunum was noted rule out   enteritis.    IMPRESSION:  1.  Abdominal pain.  2.  Abnormal CT of the abdomen.  3.  Anemia.  4.  Renal failure, on dialysis.  5.  Hypertension.  6.  Diabetes mellitus.  7.  Slight elevation of lipase, most likely from chronic renal failure.    PLAN:  The patient will be continued on clear liquids.  I will schedule him for   an EGD tomorrow and will make further recommendations based on the EGD report.    Thank you Dr. Garcia for allowing us to see Mr. Greyson Hudson's care.      STR/IN  dd: 01/07/2018 13:02:21 (CST)  td: 01/07/2018 21:39:43 (CST)  Doc ID   #[id number]  Job ID #[id number]    CC:

## 2018-01-09 NOTE — ANESTHESIA POSTPROCEDURE EVALUATION
"Anesthesia Post Evaluation    Patient: Greyson Hudson III    Procedure(s) Performed: Procedure(s) (LRB):  ESOPHAGOGASTRODUODENOSCOPY (EGD) (Left)    Final Anesthesia Type: general  Patient location during evaluation: GI PACU  Patient participation: Yes- Able to Participate  Level of consciousness: awake and alert, awake and oriented  Post-procedure vital signs: reviewed and stable  Pain management: adequate  Airway patency: patent  PONV status at discharge: No PONV  Anesthetic complications: no      Cardiovascular status: blood pressure returned to baseline and hemodynamically stable  Respiratory status: unassisted, spontaneous ventilation and room air  Hydration status: euvolemic  Follow-up not needed.        Visit Vitals  BP (!) 148/80   Pulse 90   Temp 37.1 °C (98.8 °F) (Oral)   Resp 18   Ht 5' 6" (1.676 m)   Wt 55.8 kg (123 lb 0.3 oz)   SpO2 97%   BMI 19.86 kg/m²       Pain/Steph Score: Pain Assessment Performed: Yes (1/9/2018  2:06 PM)  Presence of Pain: denies (1/9/2018  2:06 PM)  Pain Rating Prior to Med Admin: 8 (1/9/2018  4:11 AM)  Pain Rating Post Med Admin: 2 (1/9/2018  5:11 AM)  Steph Score: 10 (1/9/2018  2:06 PM)      "

## 2018-01-09 NOTE — ANESTHESIA PREPROCEDURE EVALUATION
01/09/2018  Greyson Hudson III is a 52 y.o., male.    Anesthesia Evaluation    I have reviewed the Patient Summary Reports.     I have reviewed the Medications.     Review of Systems  Anesthesia Hx:  No problems with previous Anesthesia    Social:  No Alcohol Use, Non-Smoker    Hematology/Oncology:         -- Anemia: Hematology Comments: HIV+   Cardiovascular:   Hypertension    Renal/:   Chronic Renal Disease, ESRD    Neurological:   Seizures        Physical Exam  General:  Well nourished    Airway/Jaw/Neck:  Airway Findings: Mouth Opening: Normal Tongue: Normal  General Airway Assessment: Adult  Mallampati: II  TM Distance: Normal, at least 6 cm  Jaw/Neck Findings:  Neck ROM: Normal ROM      Dental:  Dental Findings: In tact   Chest/Lungs:  Chest/Lungs Findings: Clear to auscultation, Normal Respiratory Rate     Heart/Vascular:  Heart Findings: Rate: Normal        Mental Status:  Mental Status Findings:  Cooperative, Alert and Oriented         Anesthesia Plan  Type of Anesthesia, risks & benefits discussed:  Anesthesia Type:  general  Patient's Preference:   Intra-op Monitoring Plan: standard ASA monitors  Intra-op Monitoring Plan Comments:   Post Op Pain Control Plan: multimodal analgesia, IV/PO Opioids PRN and per primary service following discharge from PACU  Post Op Pain Control Plan Comments:   Induction:   IV  Beta Blocker:  Patient is not currently on a Beta-Blocker (No further documentation required).       Informed Consent: Patient understands risks and agrees with Anesthesia plan.  Questions answered. Anesthesia consent signed with patient.  ASA Score: 3     Day of Surgery Review of History & Physical:    H&P update referred to the surgeon.         Ready For Surgery From Anesthesia Perspective.

## 2018-01-09 NOTE — DISCHARGE SUMMARY
[facility]  Discharge Summary      Admit Date: 1/7/2018    Discharge Date and Time:  01/09/2018 1:29 PM    Attending Physician: Freida Moore MD     Reason for Admission: Abdominal Pain, Bleeding    Procedures Performed: Procedure(s) (LRB):  ESOPHAGOGASTRODUODENOSCOPY (EGD) (Left)    Hospital Course (synopsis of major diagnoses, care, treatment, and services provided during the course of the hospital stay): Ongoing     Consults: GI    Significant Diagnostic Studies: EGD    Final Diagnoses:    Principal Problem: Hyperkalemia, diminished renal excretion   Secondary Diagnoses: Pancreatitis    Discharged Condition: good    Disposition: Admitted as an Inpatient    Follow Up/Patient Instructions: Follow-up with referring physician             Resume previous diet and activity.    Medications:  Transfer Medications (for Discharge Readmit only):   Current Facility-Administered Medications   Medication Dose Route Frequency Provider Last Rate Last Dose    0.9%  NaCl infusion (for blood administration)   Intravenous Q24H PRN Freida Moore MD        0.9%  NaCl infusion   Intravenous PRN Freida Moore MD        0.9%  NaCl infusion   Intravenous Once Freida Moore MD        0.9%  NaCl infusion   Intravenous PRN Baljit Og MD   1,000 mL at 01/08/18 0914    0.9%  NaCl infusion   Intravenous Once Baljit Og MD        0.9%  NaCl infusion   Intravenous Continuous Dhruv Shore MD        acetaminophen tablet 650 mg  650 mg Oral Q8H PRN Johan Lui DNP        acetaminophen tablet 650 mg  650 mg Oral Q8H PRN Johan Lui DNP        acyclovir capsule 800 mg  800 mg Oral TID Freida Moore MD   800 mg at 01/09/18 0639    amLODIPine tablet 10 mg  10 mg Oral Daily Johan Lui DNP   10 mg at 01/08/18 1359    doxycycline tablet 100 mg  100 mg Oral Q12H Johan Lui DNP   100 mg at 01/08/18 2141    FLUoxetine capsule 10 mg   10 mg Oral Daily Freida Moore MD   10 mg at 01/08/18 1400    hydrALAZINE injection 10 mg  10 mg Intravenous Q4H PRN Freida Moore MD        hydrocodone-acetaminophen 10-325mg per tablet 1 tablet  1 tablet Oral Q4H PRN Johan Lui DNP   1 tablet at 01/09/18 0411    hydrocodone-acetaminophen 5-325mg per tablet 1 tablet  1 tablet Oral Q4H PRN Johan Lui DNP   1 tablet at 01/07/18 1733    levETIRAcetam tablet 500 mg  500 mg Oral BID Johan Lui DNP   500 mg at 01/08/18 2141    mupirocin 2 % ointment 22 g  1 Tube Topical (Top) TID Johan Lui DNP   22 g at 01/09/18 0639    ondansetron disintegrating tablet 4 mg  4 mg Oral Q8H PRN Johan Lui DNP   4 mg at 01/07/18 1947    ondansetron injection 4 mg  4 mg Intravenous Q8H PRN Johan Lui DNP        pantoprazole EC tablet 40 mg  40 mg Oral Daily Aydin Bruce MD   40 mg at 01/08/18 1359    sevelamer carbonate tablet 1,600 mg  1,600 mg Oral TID WM Johan Lui DNP   1,600 mg at 01/08/18 1357     Facility-Administered Medications Ordered in Other Encounters   Medication Dose Route Frequency Provider Last Rate Last Dose    lidocaine (cardiac) injection    PRN Radha Horne CRNA   100 mg at 01/09/18 1324    propofol (DIPRIVAN) 10 mg/mL infusion    PRN Radha Horne CRNA   40 mg at 01/09/18 1328     No discharge procedures on file.

## 2018-01-10 VITALS
RESPIRATION RATE: 19 BRPM | SYSTOLIC BLOOD PRESSURE: 135 MMHG | WEIGHT: 123 LBS | HEART RATE: 90 BPM | DIASTOLIC BLOOD PRESSURE: 83 MMHG | TEMPERATURE: 99 F | BODY MASS INDEX: 19.77 KG/M2 | OXYGEN SATURATION: 98 % | HEIGHT: 66 IN

## 2018-01-10 PROBLEM — K92.2 GI BLEED: Status: RESOLVED | Noted: 2018-01-07 | Resolved: 2018-01-10

## 2018-01-10 PROBLEM — E87.5 HYPERKALEMIA, DIMINISHED RENAL EXCRETION: Status: RESOLVED | Noted: 2018-01-07 | Resolved: 2018-01-10

## 2018-01-10 PROBLEM — Z91.199 NONCOMPLIANCE: Chronic | Status: ACTIVE | Noted: 2018-01-10

## 2018-01-10 LAB
ALBUMIN SERPL BCP-MCNC: 2.6 G/DL
ALP SERPL-CCNC: 56 U/L
ALT SERPL W/O P-5'-P-CCNC: 8 U/L
ANION GAP SERPL CALC-SCNC: 7 MMOL/L
AST SERPL-CCNC: 16 U/L
BASOPHILS # BLD AUTO: 0.01 K/UL
BASOPHILS NFR BLD: 0.4 %
BILIRUB SERPL-MCNC: 0.4 MG/DL
BUN SERPL-MCNC: 32 MG/DL
CALCIUM SERPL-MCNC: 9.2 MG/DL
CHLORIDE SERPL-SCNC: 108 MMOL/L
CO2 SERPL-SCNC: 20 MMOL/L
CREAT SERPL-MCNC: 8 MG/DL
DIFFERENTIAL METHOD: ABNORMAL
EOSINOPHIL # BLD AUTO: 0.1 K/UL
EOSINOPHIL NFR BLD: 3.2 %
ERYTHROCYTE [DISTWIDTH] IN BLOOD BY AUTOMATED COUNT: 14.9 %
EST. GFR  (AFRICAN AMERICAN): 8 ML/MIN/1.73 M^2
EST. GFR  (NON AFRICAN AMERICAN): 7 ML/MIN/1.73 M^2
GLUCOSE SERPL-MCNC: 123 MG/DL
HBV SURFACE AG SERPL QL IA: NEGATIVE
HCT VFR BLD AUTO: 22.6 %
HEP. B SURF AB, QUAL: POSITIVE
HEP. B SURF AB, QUANT.: NORMAL MIU/ML
HGB BLD-MCNC: 7.8 G/DL
LYMPHOCYTES # BLD AUTO: 0.8 K/UL
LYMPHOCYTES NFR BLD: 31.5 %
MCH RBC QN AUTO: 31.2 PG
MCHC RBC AUTO-ENTMCNC: 34.5 G/DL
MCV RBC AUTO: 90 FL
MONOCYTES # BLD AUTO: 0.5 K/UL
MONOCYTES NFR BLD: 18.1 %
NEUTROPHILS # BLD AUTO: 1.2 K/UL
NEUTROPHILS NFR BLD: 46.8 %
PLATELET # BLD AUTO: 173 K/UL
PMV BLD AUTO: 10.3 FL
POCT GLUCOSE: 72 MG/DL (ref 70–110)
POTASSIUM SERPL-SCNC: 4.5 MMOL/L
PROT SERPL-MCNC: 8.9 G/DL
RBC # BLD AUTO: 2.5 M/UL
SODIUM SERPL-SCNC: 135 MMOL/L
WBC # BLD AUTO: 2.48 K/UL

## 2018-01-10 PROCEDURE — 85025 COMPLETE CBC W/AUTO DIFF WBC: CPT

## 2018-01-10 PROCEDURE — 80053 COMPREHEN METABOLIC PANEL: CPT

## 2018-01-10 PROCEDURE — 36415 COLL VENOUS BLD VENIPUNCTURE: CPT

## 2018-01-10 PROCEDURE — 25000003 PHARM REV CODE 250: Performed by: INTERNAL MEDICINE

## 2018-01-10 PROCEDURE — 25000003 PHARM REV CODE 250: Performed by: NURSE PRACTITIONER

## 2018-01-10 RX ORDER — ACYCLOVIR 200 MG/1
800 CAPSULE ORAL 3 TIMES DAILY
Qty: 360 CAPSULE | Refills: 11 | Status: ON HOLD | OUTPATIENT
Start: 2018-01-10 | End: 2018-11-26 | Stop reason: HOSPADM

## 2018-01-10 RX ORDER — DOXYCYCLINE HYCLATE 100 MG
100 TABLET ORAL EVERY 12 HOURS
Qty: 8 TABLET | Refills: 0 | Status: SHIPPED | OUTPATIENT
Start: 2018-01-10 | End: 2018-01-14

## 2018-01-10 RX ORDER — PANTOPRAZOLE SODIUM 40 MG/1
40 TABLET, DELAYED RELEASE ORAL DAILY
Qty: 30 TABLET | Refills: 11 | Status: SHIPPED | OUTPATIENT
Start: 2018-01-10 | End: 2019-08-08

## 2018-01-10 RX ADMIN — LEVETIRACETAM 500 MG: 500 TABLET, FILM COATED ORAL at 08:01

## 2018-01-10 RX ADMIN — AMLODIPINE BESYLATE 10 MG: 5 TABLET ORAL at 08:01

## 2018-01-10 RX ADMIN — DOXYCYCLINE HYCLATE 100 MG: 100 TABLET, COATED ORAL at 08:01

## 2018-01-10 RX ADMIN — FLUOXETINE 10 MG: 10 CAPSULE ORAL at 08:01

## 2018-01-10 RX ADMIN — SEVELAMER CARBONATE 1600 MG: 800 TABLET, FILM COATED ORAL at 08:01

## 2018-01-10 RX ADMIN — ACYCLOVIR 800 MG: 200 CAPSULE ORAL at 06:01

## 2018-01-10 RX ADMIN — MUPIROCIN 22 G: 20 OINTMENT TOPICAL at 06:01

## 2018-01-10 RX ADMIN — HYDROCODONE BITARTRATE AND ACETAMINOPHEN 1 TABLET: 5; 325 TABLET ORAL at 08:01

## 2018-01-10 NOTE — PROGRESS NOTES
"EDUCATION:  TN provided with educational information on GI Disorders, previously discussed on first meeting with pt..  Information reviewed and placed in :My Healthcare Packet" to be brought home for pt to use as resource after discharge.  Information included:  signs and symptoms to look for and call the doctor if experiencing, and symptoms that may indicate a medical emergency: CALL 911.    Reminded pt things he will be responsible for to manage his healthcare at home: getting Rx filled, attending follow up appointments, and taking medication as prescribed were discussed.   Teach back method used.  All questions answered.  Patient verbalized understanding of all information.  Pt stated he will be attending HD on tomorrow.    TN informed floor nurse, Kerry, care management is complete and can proceed with discharge teaching.      "

## 2018-01-10 NOTE — NURSING
Received report from TERESSA Sanders. The pt is stable and is not in any pain or discomfort. Tele monitor in progress with alarms set. 1000 ml of fluid was removed in dialysis. Safety precautions maintained and bed locked in lowest position. Side rails up X2. Call bell and personal items within reach. Will continue to monitor pt for any changes.

## 2018-01-10 NOTE — PROGRESS NOTES
TN contacted [facility] at [phone]; spoke with Ashwini; pt sees Dr. Herb Mason. Pt will be scheduled and contacted by the clinic.

## 2018-01-10 NOTE — PROGRESS NOTES
The patient tolerated solid food without issues.  He did not have rectal bleeding.    Vitals:    01/10/18 0021 01/10/18 0155 01/10/18 0413 01/10/18 0538   BP: (!) 145/94  129/79    BP Location: Left arm  Left arm    Patient Position: Lying  Lying    Pulse: 95  104    Resp: 20  (!) 2    Temp: 98 °F (36.7 °C)  97.2 °F (36.2 °C)    TempSrc: Oral  Oral    SpO2: 99% 98% 98% 98%   Weight:       Height:          sodium chloride 0.9%   Intravenous Once    sodium chloride 0.9%   Intravenous Once    acyclovir  800 mg Oral TID    amLODIPine  10 mg Oral Daily    doxycycline  100 mg Oral Q12H    FLUoxetine  10 mg Oral Daily    levETIRAcetam  500 mg Oral BID    mupirocin  1 Tube Topical (Top) TID    pantoprazole  40 mg Oral Daily    sevelamer carbonate  1,600 mg Oral TID WM     P.E.:  GEN: A x O x 3, NAD  HEENT: EOMI, PERRL, anicteric sclera  CV: RRR, no M/R/G  Chest: CTA B  Abdomen: soft, tender, nondistended, normoactive BS  Ext: No C/C/E. 2+ dorsalis pedis pulses B    Labs:  Lab Results   Component Value Date    WBC 3.30 (L) 01/09/2018    HGB 7.9 (L) 01/09/2018    HCT 23.4 (L) 01/09/2018    MCV 91 01/09/2018     01/09/2018     CMP  Sodium   Date Value Ref Range Status   01/09/2018 137 136 - 145 mmol/L Final     Potassium   Date Value Ref Range Status   01/09/2018 4.9 3.5 - 5.1 mmol/L Final     Chloride   Date Value Ref Range Status   01/09/2018 105 95 - 110 mmol/L Final     CO2   Date Value Ref Range Status   01/09/2018 21 (L) 23 - 29 mmol/L Final     Glucose   Date Value Ref Range Status   01/09/2018 86 70 - 110 mg/dL Final     BUN, Bld   Date Value Ref Range Status   01/09/2018 66 (H) 6 - 20 mg/dL Final     Creatinine   Date Value Ref Range Status   01/09/2018 12.4 (H) 0.5 - 1.4 mg/dL Final     Calcium   Date Value Ref Range Status   01/09/2018 8.6 (L) 8.7 - 10.5 mg/dL Final     Total Protein   Date Value Ref Range Status   01/09/2018 8.7 (H) 6.0 - 8.4 g/dL Final     Albumin   Date Value Ref Range Status    01/09/2018 2.6 (L) 3.5 - 5.2 g/dL Final     Total Bilirubin   Date Value Ref Range Status   01/09/2018 0.6 0.1 - 1.0 mg/dL Final     Comment:     For infants and newborns, interpretation of results should be based  on gestational age, weight and in agreement with clinical  observations.  Premature Infant recommended reference ranges:  Up to 24 hours.............<8.0 mg/dL  Up to 48 hours............<12.0 mg/dL  3-5 days..................<15.0 mg/dL  6-29 days.................<15.0 mg/dL       Alkaline Phosphatase   Date Value Ref Range Status   01/09/2018 52 (L) 55 - 135 U/L Final     AST   Date Value Ref Range Status   01/09/2018 16 10 - 40 U/L Final     ALT   Date Value Ref Range Status   01/09/2018 8 (L) 10 - 44 U/L Final     Anion Gap   Date Value Ref Range Status   01/09/2018 11 8 - 16 mmol/L Final     eGFR if    Date Value Ref Range Status   01/09/2018 5 (A) >60 mL/min/1.73 m^2 Final     eGFR if non    Date Value Ref Range Status   01/09/2018 4 (A) >60 mL/min/1.73 m^2 Final     Comment:     Calculation used to obtain the estimated glomerular filtration  rate (eGFR) is the CKD-EPI equation.          No results for input(s): PT, INR, APTT in the last 24 hours.    CT of Abdomen/Pelvis:    Probable small cystic lesions in the left lobe of the liver and upper pole left kidney.  Followup ultrasound abdominal ultrasound can be performed to further assess.    The pancreas appears enlarged without significant fat stranding or peripancreatic fluid collection identified.  Correlate clinically with lipase/amylase for potential pancreatitis.    Abnormal loops of mildly distended proximal jejunum in the upper abdomen demonstrating circumferential wall thickening, nonspecific, possibly related to enteritis or reactive inflammation to adjacent pancreatitis.      Ultrasound:       Fatty infiltration of the liver.  Increased echogenicity of the kidneys bilaterally suggestive of medical renal  disease.  Left renal cyst.  Incidental note made of a pericardial effusion.       A/P:  The patient is a 52 year old man with a history of HTN, ESRD, HIV, syphilis, and seizures presenting abscesses, hemoptysis, rectal bleeding, and pancreatitis.  1.  GI Bleed - he had hemoptysis then had rectal bleeding once or twice.  It is unclear if he had true hematochezia or if he swallowed blood after having hemoptysis.  An EGD was performed and showed mild gastritis.  Pathology is pending.  NSAIDs should be avoided and protonix can be continued.  Labs from today are pending, but he has not had further bleeding.  He will eventually benefit from a colonoscopy. This can be performed in the outpatient setting if his H/H is stable and he states he would like this to be performed in [facility], where he is from.  2.  Pancreatitis - the etiology of this is unclear.  He has HIV.  The patient denies any new medications or alcohol use.  A triglyceride level is normal.  He is tolerating a solid diet.    Gastroenterology has nothing additional to add.  Please call GI with any further issues.

## 2018-01-10 NOTE — PLAN OF CARE
Problem: Hemodialysis (Adult)  Goal: Signs and Symptoms of Listed Potential Problems Will be Absent, Minimized or Managed (Hemodialysis)  Signs and symptoms of listed potential problems will be absent, minimized or managed by discharge/transition of care (reference Hemodialysis (Adult) CPG).    01/09/18 1933   Hemodialysis   Problems Assessed (Hemodialysis) all   Problems Present (Hemodialysis) cardiovascular complications;electrolyte imbalance;fluid imbalance;infection;neurologic complications;process complications;situational response   3.5 hour hd tx in progress as ordered.

## 2018-01-10 NOTE — PLAN OF CARE
Problem: Infection, Risk/Actual (Adult)  Intervention: Manage Suspected/Actual Infection   01/09/18 2215   Safety Interventions   Isolation Precautions environmental surveillance     Intervention: Prevent Infection/Maximize Resistance   01/09/18 1018   Respiratory Interventions   Airway/Ventilation Management airway patency maintained   Nutrition Interventions   Oral Nutrition Promotion calorie dense foods provided       Goal: Identify Related Risk Factors and Signs and Symptoms  Related risk factors and signs and symptoms are identified upon initiation of Human Response Clinical Practice Guideline (CPG)   Outcome: Ongoing (interventions implemented as appropriate)   01/09/18 0723   Infection, Risk/Actual   Related Risk Factors (Infection, Risk/Actual) chronic illness/condition;poor personal hygiene;immunosuppressed;malnutrition;sleep disturbance;surgery/procedure   Signs and Symptoms (Infection, Risk/Actual) malaise;pain     Goal: Infection Prevention/Resolution  Patient will demonstrate the desired outcomes by discharge/transition of care.   Outcome: Ongoing (interventions implemented as appropriate)   01/09/18 0723   Infection, Risk/Actual (Adult)   Infection Prevention/Resolution making progress toward outcome       Problem: Fall Risk (Adult)  Intervention: Monitor/Assist with Self Care   01/09/18 2215 01/10/18 0731 01/10/18 0800   Functional Level Current   Ambulation 2 - assistive person --  --    Transferring 2 - assistive person --  --    Toileting 2 - assistive person --  --    Bathing 2 - assistive person --  --    Dressing 2 - assistive person --  --    Eating 0 - independent --  --    Communication 0 - understands/communicates without difficulty --  --    Swallowing 0 - swallows foods/liquids without difficulty --  --    Daily Care Interventions   Self-Care Promotion --  independence encouraged --    Activity   Activity Assistance Provided --  --  assistance, stand-by     Intervention: Reduce  Risk/Promote Restraint Free Environment   01/07/18 0543   Safety Interventions   Safety Precautions emergency equipment at bedside     Intervention: Patient Rounds   01/10/18 0800   Safety Interventions   Patient Rounds bed in low position;call light in reach;bed wheels locked;clutter free environment maintained;ID band on;placement of personal items at bedside;toileting offered;visualized patient     Intervention: Safety Promotion/Fall Prevention   01/10/18 0800   Safety Interventions   Safety Promotion/Fall Prevention assistive device/personal item within reach;bed alarm set;side rails raised x 2     Intervention: Safety Precautions   01/07/18 0543   Safety Interventions   Safety Precautions emergency equipment at bedside       Goal: Identify Related Risk Factors and Signs and Symptoms  Related risk factors and signs and symptoms are identified upon initiation of Human Response Clinical Practice Guideline (CPG)   Outcome: Ongoing (interventions implemented as appropriate)   01/09/18 0723   Fall Risk   Related Risk Factors (Fall Risk) age-related changes;confusion/agitation;gait/mobility problems;sleep pattern alteration;polypharmacy;sensory deficits;environment unfamiliar   Signs and Symptoms (Fall Risk) presence of risk factors     Goal: Absence of Falls  Patient will demonstrate the desired outcomes by discharge/transition of care.   Outcome: Ongoing (interventions implemented as appropriate)   01/10/18 0545   Fall Risk (Adult)   Absence of Falls making progress toward outcome

## 2018-01-10 NOTE — PLAN OF CARE
Problem: Infection, Risk/Actual (Adult)  Goal: Identify Related Risk Factors and Signs and Symptoms  Related risk factors and signs and symptoms are identified upon initiation of Human Response Clinical Practice Guideline (CPG)   Outcome: Ongoing (interventions implemented as appropriate)   01/09/18 0723   Infection, Risk/Actual   Related Risk Factors (Infection, Risk/Actual) chronic illness/condition;poor personal hygiene;immunosuppressed;malnutrition;sleep disturbance;surgery/procedure   Signs and Symptoms (Infection, Risk/Actual) malaise;pain     Goal: Infection Prevention/Resolution  Patient will demonstrate the desired outcomes by discharge/transition of care.   Outcome: Ongoing (interventions implemented as appropriate)   01/09/18 0723   Infection, Risk/Actual (Adult)   Infection Prevention/Resolution making progress toward outcome       Problem: Fall Risk (Adult)  Goal: Identify Related Risk Factors and Signs and Symptoms  Related risk factors and signs and symptoms are identified upon initiation of Human Response Clinical Practice Guideline (CPG)   Outcome: Ongoing (interventions implemented as appropriate)   01/09/18 0723   Fall Risk   Related Risk Factors (Fall Risk) age-related changes;confusion/agitation;gait/mobility problems;sleep pattern alteration;polypharmacy;sensory deficits;environment unfamiliar   Signs and Symptoms (Fall Risk) presence of risk factors     Goal: Absence of Falls  Patient will demonstrate the desired outcomes by discharge/transition of care.   Outcome: Ongoing (interventions implemented as appropriate)   01/10/18 0545   Fall Risk (Adult)   Absence of Falls making progress toward outcome

## 2018-01-10 NOTE — PROGRESS NOTES
Follow-up Information     [facility].    Specialties:  Neurosurgery, Plastic Surgery, Podiatry, Surgical Oncology, Allergy, Cardiothoracic Surgery, Otolaryngology, Gastroenterology, Breast Surgery, Oral Surgery, Oral and Maxillofacial Surgery, Cardiology, Bariatrics, Internal Medicine, Family Medicine, Colon and Rectal Surgery, Dental General Practice, Gynecology, Orthopedic Surgery, Genetics, Endocrinology, Vascular Surgery, Physical Medicine and Rehabilitation, Urology, Neurology, Dermatology, Rheumatology, Occupational Therapy  Why:  Outpatient Services PCP Follow-Up  with Dr. Herb Mason; clinic will contact patient.  Contact information:  [address] [place] [id number]  [phone]               PLEASE BRING TO ALL FOLLOW UP APPOINTMENTS:   A COPY YOUR DISCHARGE INSTRUCTIONS, Any new MEDICINES YOU ARE CURRENTLY TAKING IN THEIR ORIGINAL BOTTLES  And IDENTIFICATION AND INSURANCE CARD     **PLEASE ARRIVE 15 MINUTES AHEAD OF SCHEDULED APPOINTMENT TIME   ++PLEASE CALL 24 HOURS IN ADVANCE IF YOU MUST RESCHEDULE YOUR APPOINTMENT DAY AND/OR TIME     [facility]    WRITTEN HEALTHCARE AND DISCHARGE INFORMATION                        Help at Home           [phone]  After discharge for assistance [facility] On Call Nurse Care Line 24/7  Assistance    Things You are responsible For To Manage Your Care At Home:  1.    Getting your prescriptions filled   2.    Taking your medications as directed, DO NOT MISS ANY DOSES!  3.    Going to your follow-up doctor appointment. This is important because it  allow the doctor to monitor your progress and determine if  any changes need to made to your treatment plan.     Thank you for choosing [facility] for your care.  Please answer any calls you may receive from [facility] we want to continue to support you as you manage your healthcare needs. [facility] is happy to have the opportunity to serve you.     Sincerely,  Your [facility]  Team,  Dafne GANNON; Transition Navigator [phone]

## 2018-01-10 NOTE — PROGRESS NOTES
3.5 hour hd tx completed. Report to Floor RN as same via landline; [phone]=[phone]. Well tolerated by pt. Although did have small disturbance early on in the treatment, while sleeping pt accidentally self d/c'd one of his dialysis needles, bleeding controlled via direct pressure, then re-cannulated access and continued with hd tx as ordered.

## 2018-01-10 NOTE — SUBJECTIVE & OBJECTIVE
Interval History: EGD      Review of Systems   Constitutional: Positive for appetite change, chills, fatigue and fever.   HENT: Positive for congestion, ear pain and rhinorrhea.    Eyes: Positive for discharge and itching.   Respiratory: Positive for cough, shortness of breath and wheezing.    Cardiovascular: Positive for chest pain.   Gastrointestinal: Positive for abdominal pain, blood in stool and diarrhea.   Musculoskeletal: Positive for arthralgias and myalgias.   Skin: Positive for rash and wound.   Neurological: Positive for weakness and headaches.     Objective:     Vital Signs (Most Recent):  Temp: 98.3 °F (36.8 °C) (01/10/18 0810)  Pulse: 96 (01/10/18 0810)  Resp: 19 (01/10/18 0810)  BP: 131/78 (01/10/18 0810)  SpO2: 100 % (01/10/18 0810) Vital Signs (24h Range):  Temp:  [97.2 °F (36.2 °C)-98.8 °F (37.1 °C)] 98.3 °F (36.8 °C)  Pulse:  [] 96  Resp:  [2-20] 19  SpO2:  [95 %-100 %] 100 %  BP: (121-158)/() 131/78     Weight: 55.8 kg (123 lb 0.3 oz)  Body mass index is 19.86 kg/m².    Physical Exam   Constitutional: He is oriented to person, place, and time. He appears well-developed and well-nourished. No distress.   Chronically ill appearing. Appears older than stated age.   HENT:   Right Ear: External ear normal.   Left Ear: External ear normal.   Nose: Nose normal.   Eyes: EOM are normal. Pupils are equal, round, and reactive to light. No scleral icterus.   Neck: Normal range of motion. Neck supple. No thyromegaly present.   Cardiovascular: Normal rate and normal heart sounds.  Exam reveals no friction rub.    No murmur heard.  Pulmonary/Chest: Effort normal and breath sounds normal. No respiratory distress. He has no wheezes. He has no rales.   Abdominal: Soft. Bowel sounds are normal. He exhibits no mass. There is no tenderness.   Musculoskeletal: Normal range of motion. He exhibits no edema or deformity.   Neurological: He is alert and oriented to person, place, and time. No cranial nerve  deficit.   Skin: Skin is warm and dry. No pallor.   Penile shaft lesion without ulceration or tenderness  Left axilla lesion without ulceration, +tender  Lesions in gluteal fold with excoriation.  No erythema or purulence.         CRANIAL NERVES     CN III, IV, VI   Pupils are equal, round, and reactive to light.  Extraocular motions are normal.        Significant Labs: All pertinent labs within the past 24 hours have been reviewed.    Significant Imaging: I have reviewed and interpreted all pertinent imaging results/findings within the past 24 hours.

## 2018-01-10 NOTE — PROGRESS NOTES
[facility] Hospital Medicine  Progress Note    Patient Name: Greyson Hudson III  MRN: [id number]  Patient Class: IP- Inpatient   Admission Date: 1/7/2018  Length of Stay: 3 days  Attending Physician: Freida Moore MD  Primary Care Provider: Primary Doctor No        Subjective:     Principal Problem:Noncompliance    HPI:  Mr. Hudson is a 53 yo man with ESRD on dialysis TTS, non-compliance, MDD, HTN, seizure disorder, HIV not on ART CD4 120 in 10/2017, hx syphilis, anemia in chronic kidney disease, 2/2 hyperparathyroidism, hyperphosphatemia, hx UGIB, julius-wilson tear, and stroke who presented with multiple complaints and was found to have a potassium of 6.7. He reports cough x2 days and streaky hemoptysis 20 min prior to calling EMS. He complains of generalized weakness, abdominal cramps, chest pain, and intermittent shortness of breath that are chronic. He has lesions on his penis, left axilla, and gluteal fold that he reports started 2 day ago. He also reports recent diarrhea with blood in his stool, subjective fever/chills, congestion with rhinorrhea, and cough. He has been noncompliant with HD and cannot tell me the last time he attended dialysis. He cannot tell me the name of any of his medications or how many he takes. He cannot tell me the name of any of his doctors; he just knows he usually goes to [facility].    Hospital Course:  In the ER he was found to have hyperkalemia. No peaked T waves. Nephrology was consulted. He was started on doxycycline for sinusitis and acyclovir for herpes. He was also found to have +FOBT and GI was consulted. EGD planned 1/8/18 however he was anemic, so EGD postponed and he was transfused 1 unit RBC.       Interval History: EGD      Review of Systems   Constitutional: Positive for appetite change, chills, fatigue and fever.   HENT: Positive for congestion, ear pain and rhinorrhea.    Eyes: Positive for discharge and itching.   Respiratory: Positive for cough,  shortness of breath and wheezing.    Cardiovascular: Positive for chest pain.   Gastrointestinal: Positive for abdominal pain, blood in stool and diarrhea.   Musculoskeletal: Positive for arthralgias and myalgias.   Skin: Positive for rash and wound.   Neurological: Positive for weakness and headaches.     Objective:     Vital Signs (Most Recent):  Temp: 98.3 °F (36.8 °C) (01/10/18 0810)  Pulse: 96 (01/10/18 0810)  Resp: 19 (01/10/18 0810)  BP: 131/78 (01/10/18 0810)  SpO2: 100 % (01/10/18 0810) Vital Signs (24h Range):  Temp:  [97.2 °F (36.2 °C)-98.8 °F (37.1 °C)] 98.3 °F (36.8 °C)  Pulse:  [] 96  Resp:  [2-20] 19  SpO2:  [95 %-100 %] 100 %  BP: (121-158)/() 131/78     Weight: 55.8 kg (123 lb 0.3 oz)  Body mass index is 19.86 kg/m².    Physical Exam   Constitutional: He is oriented to person, place, and time. He appears well-developed and well-nourished. No distress.   Chronically ill appearing. Appears older than stated age.   HENT:   Right Ear: External ear normal.   Left Ear: External ear normal.   Nose: Nose normal.   Eyes: EOM are normal. Pupils are equal, round, and reactive to light. No scleral icterus.   Neck: Normal range of motion. Neck supple. No thyromegaly present.   Cardiovascular: Normal rate and normal heart sounds.  Exam reveals no friction rub.    No murmur heard.  Pulmonary/Chest: Effort normal and breath sounds normal. No respiratory distress. He has no wheezes. He has no rales.   Abdominal: Soft. Bowel sounds are normal. He exhibits no mass. There is no tenderness.   Musculoskeletal: Normal range of motion. He exhibits no edema or deformity.   Neurological: He is alert and oriented to person, place, and time. No cranial nerve deficit.   Skin: Skin is warm and dry. No pallor.   Penile shaft lesion without ulceration or tenderness  Left axilla lesion without ulceration, +tender  Lesions in gluteal fold with excoriation.  No erythema or purulence.         CRANIAL NERVES     CN III, IV,  VI   Pupils are equal, round, and reactive to light.  Extraocular motions are normal.        Significant Labs: All pertinent labs within the past 24 hours have been reviewed.    Significant Imaging: I have reviewed and interpreted all pertinent imaging results/findings within the past 24 hours.    Assessment/Plan:      Skin lesion    Recently diagnosed with genital herpes and started on Acyclovir. Though he says these lesions are new, he is not the greatest historian. Restart acyclovir.         Acute sinusitis    Started on Doxycycline in the ER. No fever or leukocytosis. Symptomatic treatment. On exam he exhibits no signs of sinusitis.        Seizure    Unspecified seizure disorder. Started on home keppra.        HIV disease    Last CD4 of 120/10.4 % 10/9/2017 during inpatient admission.  [facility] records 7/21/17 reviewed not on ART at that time. Dapsone prophylaxis.   States that he has not taken HIV medications since the 80s and is not really interested in taking HIV medications.        Anemia of chronic renal failure, stage 5    CBC 10/10/17  Hemoglobin 8.0   Hematocrit 23.6   MCV 90.8         Essential hypertension    Suspect uncontrolled due to non-compliance with HD. Does not appear to have medication for HTN based on [facility] records. Amlodipine started in ER. PRN.        ESRD on dialysis    Non-complaint with HD.   Dialysis schedule Tues/Thurs/Sat at [facility].   Multiple admissions for emergent dialysis.  Reports that he has issues with his transportation.   He has a  through [facility]. [email or web address]          VTE Risk Mitigation         Ordered     Medium Risk of VTE  Once      01/07/18 0843     Reason for no Mechanical VTE Prophylaxis  Once      01/07/18 0843              Freida Moore MD  Department of Hospital Medicine   [facility]

## 2018-01-10 NOTE — PLAN OF CARE
01/10/18 1223   Final Note   Assessment Type Final Discharge Note   Discharge Disposition Home   What phone number can be called within the next 1-3 days to see how you are doing after discharge? (Listed in chart)   Hospital Follow Up  Appt(s) scheduled? Yes  (Pt will be contacted.)   Discharge plans and expectations educations in teach back method with documentation complete? Yes   Right Care Referral Info   Post Acute Recommendation No Care

## 2018-01-10 NOTE — PROGRESS NOTES
Patient to scheduled dialysis as ordered. Patient awake, alert, oriented without c/o discomfort at this time. No apparent distress noted.

## 2018-01-10 NOTE — DISCHARGE SUMMARY
[facility] Hospital Medicine  Discharge Summary      Patient Name: Greyson Hudson III  MRN: [id number]  Admission Date: 1/7/2018  Hospital Length of Stay: 3 days  Discharge Date and Time: 1/10/2018  Attending Physician: Freida Moore MD   Discharging Provider: Freida Moore MD  Primary Care Provider: Primary Doctor No      HPI:   Mr. Hudson is a 51 yo man with ESRD on dialysis TTS, non-compliance, MDD, HTN, seizure disorder, HIV not on ART CD4 120 in 10/2017, hx syphilis, anemia in chronic kidney disease, 2/2 hyperparathyroidism, hyperphosphatemia, hx UGIB, julius-wilson tear, and stroke who presented with multiple complaints and was found to have a potassium of 6.7. He reports cough x2 days and streaky hemoptysis 20 min prior to calling EMS. He complains of generalized weakness, abdominal cramps, chest pain, and intermittent shortness of breath that are chronic. He has lesions on his penis, left axilla, and gluteal fold that he reports started 2 day ago. He also reports recent diarrhea with blood in his stool, subjective fever/chills, congestion with rhinorrhea, and cough. He has been noncompliant with HD and cannot tell me the last time he attended dialysis. He cannot tell me the name of any of his medications or how many he takes. He cannot tell me the name of any of his doctors; he just knows he usually goes to [facility].    Procedure(s) (LRB):  ESOPHAGOGASTRODUODENOSCOPY (EGD) (Left)      Hospital Course:   In the ER he was found to have hyperkalemia. No peaked T waves. Nephrology was consulted. He was started on doxycycline for sinusitis and acyclovir for genital herpes. He was also found to have +FOBT and GI was consulted. EGD planned 1/8/18 however he was anemic, so EGD postponed and he was transfused 1 unit RBC. H&H improved appropriately and remained stable. He underwent EGD 1/9 that showed mild gastritis. No bleed source. Biopsy pending. PPI started. No further bleeding. He will need  Cscope as outpatient.   He had an enlarged pancreas on imaging. The etiology of this is unclear. Possibly related to HIV or meds? The patient denies any new medications or alcohol use.  A triglyceride level is normal.  He  was tolerating a solid diet and stable for discharge with outpatient follow up.       Consults:   Consults         Status Ordering Provider     Inpatient consult to Gastroenterology  Once     Provider:  Aydin Bruce MD    Acknowledged JAIRO RENTERIA     Inpatient consult to Nephrology  Once     Provider:  Luis Ryder MD    Acknowledged NIKA BENSON     Inpatient consult to Nephrology  Once     Provider:  Baljit Og MD    Completed JAIRO RENTERIA          Final Active Diagnoses:    Diagnosis Date Noted POA    PRINCIPAL PROBLEM:  Noncompliance [Z91.19] 01/10/2018 Not Applicable     Chronic    Skin lesion [L98.9] 01/07/2018 Yes    Acute sinusitis [J01.90] 01/07/2018 Yes    HIV disease [B20] 01/07/2018 Yes     Chronic    Seizure [R56.9] 01/07/2018 Yes     Chronic    Anemia of chronic renal failure, stage 5 [N18.5, D63.1] 10/31/2016 Yes     Chronic    ESRD on dialysis [N18.6, Z99.2] 10/30/2016 Not Applicable     Chronic    Essential hypertension [I10]  Yes     Chronic      Problems Resolved During this Admission:    Diagnosis Date Noted Date Resolved POA    Hyperkalemia, diminished renal excretion [E87.5] 01/07/2018 01/10/2018 Yes    GI bleed [K92.2] 01/07/2018 01/10/2018 Yes       Discharged Condition: stable, though I am concerned for his overall prognosis due to his extensive non-compliance    Disposition: Home or Self Care    Follow Up:  Follow-up Information     Primary Doctor No In 1 day.               Patient Instructions:     Diet renal (low potassium, low phosphorus, low sodium)     Activity as tolerated     Notify your health care provider if you experience any of the following:  increased confusion or weakness     Notify your health  care provider if you experience any of the following:  persistent dizziness, light-headedness, or visual disturbances     Notify your health care provider if you experience any of the following:  worsening rash     Notify your health care provider if you experience any of the following:  severe persistent headache     Notify your health care provider if you experience any of the following:  difficulty breathing or increased cough     Notify your health care provider if you experience any of the following:  severe uncontrolled pain     Notify your health care provider if you experience any of the following:  redness, tenderness, or signs of infection (pain, swelling, redness, odor or green/yellow discharge around incision site)     Notify your health care provider if you experience any of the following:  persistent nausea and vomiting or diarrhea     Notify your health care provider if you experience any of the following:  temperature >100.4           Medications:  Reconciled Home Medications:   Current Discharge Medication List      START taking these medications    Details   acyclovir (ZOVIRAX) 200 MG capsule Take 4 capsules (800 mg total) by mouth 3 (three) times daily.  Qty: 360 capsule, Refills: 11      doxycycline (VIBRA-TABS) 100 MG tablet Take 1 tablet (100 mg total) by mouth every 12 (twelve) hours.  Qty: 8 tablet, Refills: 0      pantoprazole (PROTONIX) 40 MG tablet Take 1 tablet (40 mg total) by mouth once daily.  Qty: 30 tablet, Refills: 11         CONTINUE these medications which have NOT CHANGED    Details   amlodipine (NORVASC) 10 MG tablet Take 1 tablet (10 mg total) by mouth once daily.  Qty: 30 tablet, Refills: 0      hydrocodone-acetaminophen 5-325mg (NORCO) 5-325 mg per tablet Take 1 tablet by mouth every 4 (four) hours as needed for Pain.  Qty: 4 tablet, Refills: 0      levetiracetam (KEPPRA) 250 MG Tab Take 2 tablets (500 mg total) by mouth 2 (two) times daily.  Qty: 60 tablet, Refills: 0       sevelamer carbonate (RENVELA) 800 mg Tab Take 2 tablets (1,600 mg total) by mouth 3 (three) times daily with meals.  Qty: 90 tablet, Refills: 0             Indwelling Lines/Drains at time of discharge:   Lines/Drains/Airways     Central Venous Catheter Line                 Trialysis (Dialysis) Catheter right subclavian -- days          Drain                 Hemodialysis AV Fistula Right upper arm -- days                Time spent on the discharge of patient: 60 minutes  Patient was seen and examined on the date of discharge and determined to be suitable for discharge.         Freida Moore MD  Department of Hospital Medicine  [facility]

## 2018-01-10 NOTE — PROGRESS NOTES
Pt presented to the acute dialysis room for a 3.5 hour tx. Pt assessed, right UE AVF accessed according to P&P, using 16ga dialysis angios as per pt request. All connections attached and secured. Continue with plan. 3.5 hour hd tx in progress.

## 2018-01-12 LAB
BACTERIA BLD CULT: NORMAL
BACTERIA BLD CULT: NORMAL

## 2018-01-23 NOTE — PHYSICIAN QUERY
PT Name: Greyson Hudson III  MR #: [id number]    Physician Query Form - Cause and Effect Relationship Clarification      CDS/: Faye Yeboah RN             Contact information: [phone]    This form is a permanent document in the medical record.     Query Date: January 23, 2018    By submitting this query, we are merely seeking further clarification of documentation. Please utilize your independent clinical judgment when addressing the question(s) below.    The Medical record contains the following:  Supporting Clinical Findings   Location in record    HIV not on ART  in 10/2014       He has lesions on his penis, left axilla, and gluteal fold that he reports started 2 day ago        Acyclovir for genital herpes.                                                                                                                                                                               1/10 DC summary         Provider, please clarify if there is any correlation between HIV  and Herpes.           Are the conditions:     [ x ] Due to or associated with each other     [  ] Unrelated to each other     [  ] Other (Please Specify): _________________________     [  ] Clinically Undetermined

## 2018-04-09 PROBLEM — J01.90 ACUTE SINUSITIS: Status: RESOLVED | Noted: 2018-01-07 | Resolved: 2018-04-09

## 2018-05-14 ENCOUNTER — SURGERY (OUTPATIENT)
Age: 53
End: 2018-05-14

## 2018-05-14 ENCOUNTER — HOSPITAL ENCOUNTER (OUTPATIENT)
Facility: OTHER | Age: 53
Discharge: HOME | End: 2018-05-14
Attending: INTERNAL MEDICINE | Admitting: INTERNAL MEDICINE
Payer: MEDICAID

## 2018-05-14 VITALS
TEMPERATURE: 99 F | OXYGEN SATURATION: 100 % | DIASTOLIC BLOOD PRESSURE: 93 MMHG | RESPIRATION RATE: 16 BRPM | HEART RATE: 72 BPM | SYSTOLIC BLOOD PRESSURE: 172 MMHG

## 2018-05-14 DIAGNOSIS — T82.590A MECHANICAL COMPLICATION OF ARTERIOVENOUS FISTULA SURGICALLY CREATED, INITIAL ENCOUNTER: ICD-10-CM

## 2018-05-14 DIAGNOSIS — T82.599A MECHANICAL COMPLICATION OF VASCULAR DEVICE: ICD-10-CM

## 2018-05-14 PROCEDURE — 63600175 PHARM REV CODE 636 W HCPCS

## 2018-05-14 PROCEDURE — 25000003 PHARM REV CODE 250: Performed by: INTERNAL MEDICINE

## 2018-05-14 PROCEDURE — 27201059 HC S ENCORE INFLATION DEVICE: Performed by: INTERNAL MEDICINE

## 2018-05-14 PROCEDURE — 36902 INTRO CATH DIALYSIS CIRCUIT: CPT | Performed by: INTERNAL MEDICINE

## 2018-05-14 PROCEDURE — C1725 CATH, TRANSLUMIN NON-LASER: HCPCS | Performed by: INTERNAL MEDICINE

## 2018-05-14 PROCEDURE — 63600175 PHARM REV CODE 636 W HCPCS: Performed by: INTERNAL MEDICINE

## 2018-05-14 PROCEDURE — 25000003 PHARM REV CODE 250

## 2018-05-14 PROCEDURE — 25500020 PHARM REV CODE 255

## 2018-05-14 PROCEDURE — C1894 INTRO/SHEATH, NON-LASER: HCPCS | Performed by: INTERNAL MEDICINE

## 2018-05-14 PROCEDURE — C1769 GUIDE WIRE: HCPCS | Performed by: INTERNAL MEDICINE

## 2018-05-14 PROCEDURE — 99152 MOD SED SAME PHYS/QHP 5/>YRS: CPT | Performed by: INTERNAL MEDICINE

## 2018-05-14 RX ORDER — LIDOCAINE HYDROCHLORIDE 10 MG/ML
1 INJECTION, SOLUTION EPIDURAL; INFILTRATION; INTRACAUDAL; PERINEURAL ONCE
Status: COMPLETED | OUTPATIENT
Start: 2018-05-14 | End: 2018-05-14

## 2018-05-14 RX ORDER — FENTANYL CITRATE 50 UG/ML
INJECTION, SOLUTION INTRAMUSCULAR; INTRAVENOUS
Status: DISCONTINUED | OUTPATIENT
Start: 2018-05-14 | End: 2018-05-14 | Stop reason: HOSPADM

## 2018-05-14 RX ORDER — HEPARIN SOD,PORCINE/0.9 % NACL 1000/500ML
INTRAVENOUS SOLUTION INTRAVENOUS
Status: DISCONTINUED | OUTPATIENT
Start: 2018-05-14 | End: 2018-05-14 | Stop reason: HOSPADM

## 2018-05-14 RX ORDER — MIDAZOLAM HYDROCHLORIDE 1 MG/ML
INJECTION, SOLUTION INTRAMUSCULAR; INTRAVENOUS
Status: DISCONTINUED | OUTPATIENT
Start: 2018-05-14 | End: 2018-05-14 | Stop reason: HOSPADM

## 2018-05-14 RX ADMIN — MIDAZOLAM HYDROCHLORIDE 1 MG: 1 INJECTION, SOLUTION INTRAMUSCULAR; INTRAVENOUS at 11:05

## 2018-05-14 RX ADMIN — LIDOCAINE HYDROCHLORIDE 1 ML: 10 INJECTION, SOLUTION EPIDURAL; INFILTRATION; INTRACAUDAL; PERINEURAL at 11:05

## 2018-05-14 RX ADMIN — FENTANYL CITRATE 50 MCG: 50 INJECTION, SOLUTION INTRAMUSCULAR; INTRAVENOUS at 11:05

## 2018-05-14 RX ADMIN — HEPARIN SODIUM IN SODIUM CHLORIDE 500 ML: 200 INJECTION INTRAVENOUS at 10:05

## 2018-05-14 NOTE — BRIEF OP NOTE
[facility]  Brief Operative Note     SUMMARY     Surgery Date: 5/14/2018     Surgeon(s) and Role:     * James Torres MD - Primary    Assisting Surgeon: Leroy Zhao MD    Pre-op Diagnosis:  Complication of vascular access for dialysis, subsequent encounter [T82.9XXD]  ESRD (end stage renal disease) [N18.6]    Post-op Diagnosis:  Post-Op Diagnosis Codes:     * Complication of vascular access for dialysis, subsequent encounter [T82.9XXD]     * ESRD (end stage renal disease) [N18.6]    Procedure(s) (LRB):  FISTULOGRAM (Right)    Anesthesia: 1% lidocaine, 1 mg versed, 50 mcg fentanyl    Description of the findings of the procedure: Patient was prepped and draped in a sterile fashion.  This was a successful fistulogram of his RUE brachiocephalic AV fistula with angioplasty performed in the cephalic arch portion of the fistula.  Patient tolerated the procedure well and no immediate complications noted.    Findings/Key Components: Patient was prepped and draped in a sterile fashion.  This was a successful fistulogram of his RUE brachiocephalic AV fistula with angioplasty performed in the cephalic arch portion of the fistula.  Patient tolerated the procedure well and no immediate complications noted.      Estimated Blood Loss: 15 mL         Specimens:   Specimen (12h ago through future)    None          Discharge Note    SUMMARY     Admit Date: 5/14/2018    Discharge Date and Time:  05/14/2018 11:29 AM    Hospital Course (synopsis of major diagnoses, care, treatment, and services provided during the course of the hospital stay): Patient was prepped and draped in a sterile fashion.  This was a successful fistulogram of his RUE brachiocephalic AV fistula with angioplasty performed in the cephalic arch portion of the fistula.  Patient tolerated the procedure well and no immediate complications noted.       Final Diagnosis: Post-Op Diagnosis Codes:     * Complication of vascular access for dialysis,  subsequent encounter [T82.9XXD]     * ESRD (end stage renal disease) [N18.6]    Disposition: Home or Self Care    Follow Up/Patient Instructions:     Medications:  Reconciled Home Medications:      Medication List      CONTINUE taking these medications    acyclovir 200 MG capsule  Commonly known as:  ZOVIRAX  Take 4 capsules (800 mg total) by mouth 3 (three) times daily.     amLODIPine 10 MG tablet  Commonly known as:  NORVASC  Take 1 tablet (10 mg total) by mouth once daily.     Hydrocodone-APAP 5-325 mg per tablet  Commonly known as:  NORCO  Take 1 tablet by mouth every 4 (four) hours as needed for Pain.     levETIRAcetam 250 MG Tab  Commonly known as:  KEPPRA  Take 2 tablets (500 mg total) by mouth 2 (two) times daily.     pantoprazole 40 MG tablet  Commonly known as:  PROTONIX  Take 1 tablet (40 mg total) by mouth once daily.     sevelamer carbonate 800 mg Tab  Commonly known as:  RENVELA  Take 2 tablets (1,600 mg total) by mouth 3 (three) times daily with meals.            Discharge Procedure Orders  Activity as tolerated     Lifting restrictions   Order Comments: No heavy lifting for 48 hours.     Call MD for:  temperature >100.4     Call MD for:  severe uncontrolled pain     Call MD for:  redness, tenderness, or signs of infection (pain, swelling, redness, odor or green/yellow discharge around incision site)     Call MD for:   Order Comments: Bleeding from the access site.       Follow-up Information     [facility].    Specialty:  Dialysis Center  Why:  for dialysis  Contact information:  [address] [facility] [address] [phone]

## 2018-05-14 NOTE — PROCEDURES
[facility] Interventional Nephrology Service    Fistulogram Procedure Report    Date of Procedure:  05/14/2018 11:30 AM    Procedures Performed: Fistulogram with venous angioplasty of the cephalic arch    Indication: Difficulty in cannulation    Referring Provider: Yrn Stanley     Primary Surgeon: James Torres MD  Assist: Leroy Zhao MD    Medications Administered:  1% lidocaine, 1 mg versed, 50 mcg fentanyl    Procedure Report in Detail:  After informed consent was obtained the patient was prepped and draped in the usual sterile fashion.  His right upper Bracheocephalic AVF was cannulated in the venous facing direction with a micropuncture system, confirmed in correct placement under fluroscopy, and a fistulogram was performed using iodinated contrast.  Fistulogram revealed patent mid-fistula stent complex, with a significant (>90%) stenosis at the cephalic arch.  Central vasculature was then evaluated with contrasted film revealing widely patent central vasculature.  Reflux arteriogram revealed a mild stenosis (< 40%) at the juxta-anastomosis region, which was not felt to be the culprit lesion.  More than 6cm of the brachial artery was visualized and it was patent.  The micropuncture sheath was cannulated with a glidewire which was advanced to the central circulation, and the micropuncture sheath was removed.  A 6 Fr sheath was inserted over the glidewire, and the following interventions were performed after the patient was given 1 mg versed and 50 mcg fentanyl: using a 8 x 80 mm conquest pta balloon and endeflator, we advanced the balloon to the cephalic arch and inflated to full effacement with waist seen on balloon prior to full effacement.  Follow up film (which was difficult to perform since the sheath was obstructing the flow of contrast, so the microsheath was used) revealed some improvement in the stenosis, but with residual stenosis of >50%.  We decided to re-angioplasty the cephalic arch with the same  balloon, so we advanced the same 8 x 80 mm conquest pta balloon and inflated the balloon to full effacement with waist seen prior to full effacement.  This was held fully effaced for 1 minute.  A repeat fistulogram revealed marked improvement in the cephalic arch stenosis, with no evidence of extravasation.  The fistula also lost all pulsatility and had a great systolic thrill.  The sheath was pulled, and direct pressure was used to achieve hemostasis.  No complications occurred during the procedure.     Estimated blood loss: 15 mL   Estimated contrast used: 23 mL    Impression/Plan:  This was a successful fistulogram with angioplasty performed in the cephalic arch region of the fistula.  We will have the patient return in 1 week for follow up exam and ultrasound.  Please do refer the patient back if there are any further signs of stenosis (increased venous pressure alarms, prolonged bleeding or worsening adequacy).  Of note, there were cannulations performed in the mid-fistula stent.  We will send a picture to the nursing staff of the location of the stent, so that this may be avoided in the future.    James Torres MD  [phone]

## 2018-05-14 NOTE — DISCHARGE INSTRUCTIONS
After the procedure:    · Your arm and hand will be checked to make sure blood is flowing through the fistula properly. The feeling of blood rushing through the fistula is called a thrill. It is somewhat similar to the purring of a cat. Youll be taught how to check for this feeling each day to make sure there are no problems with your fistula.   · Watch for bleeding-If bleeding occurs place light pressure on the area and call your physician.    Recovering at Home  Once at home, follow all of the instructions youve been given. Be sure to:  · Take all medications as directed.  · Care for your incision as instructed.  · Check for signs of infection at the incision site (see below).  · Avoid heavy lifting and strenuous activities as directed.  · Monitor and care for your fistula as instructed      Anesthesia: Monitored Anesthesia Care (MAC)  Youre due to have surgery. During surgery, youll be given medicine called anesthesia. This will keep you comfortable and pain-free. Your surgeon will use monitored anesthesia care (MAC). This sheet tells you more about this type of anesthesia.    What is monitored anesthesia care?  MAC keeps you very drowsy during surgery. You may be awake, but you will likely not remember much. And you wont feel pain. With MAC, medicines are given through an IV line into a vein in your arm or hand. A local anesthetic will usually be injected into the skin and muscle around the surgical site to numb it. The anesthesia provider monitors you during the procedure. He or she checks your heart rate and rhythm, blood pressure, and blood oxygen level.    Anesthesia tools and medicines that may be near you during your procedure  You will likely have:  · A pulse oximeter on the end of your finger. This measures your blood oxygen level.  · Electrocardiography leads (electrodes) on your chest. These record your heart rate and rhythm.  · Medicines given through an IV. These relax you and prevent pain. You  may be awake or sleep lightly. If you have local anesthetic, it is injected directly into your skin.  · A facemask to give you oxygen, if needed.    Risks and possible complications  MAC has some risks. These include:  · Breathing problems  · Nausea and vomiting  · Allergic reaction to the anesthetic      Anesthesia safety  Tips for anesthesia safety include the following:   · Follow all instructions you are given for how long not to eat or drink before your procedure.  · Be sure your healthcare provider knows what medicines you take, especially any anti-inflammatory medicine or blood thinners. This includes aspirin and any other over-the-counter medicines, herbs, and supplements.  · Have an adult family member or friend drive you home after the procedure.  · For the first 24 hours after your surgery:  ¨ Do not drive or use heavy equipment.  ¨ Do not make important decisions or sign documents.  ¨ Avoid alcohol.  ¨ Have someone stay with you, if possible. They can watch for problems and help keep you safe.    Date Last Reviewed: 12/1/2016  © 9470-4211 [employer]. [address] [place] [address], [place] [address]. All rights reserved. This information is not intended as a substitute for professional medical care. Always follow your healthcare professional's instructions.
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
## 2018-05-14 NOTE — PLAN OF CARE
Greyson Hudson III has met all discharge criteria from Phase II. Vital Signs are stable, ambulating  without difficulty. Discharge instructions given, patient verbalized understanding. Discharged from facility via wheelchair in stable condition.

## 2018-05-21 ENCOUNTER — SURGERY (OUTPATIENT)
Age: 53
End: 2018-05-21

## 2018-05-26 PROBLEM — T82.590A MECHANICAL COMPLICATION OF ARTERIOVENOUS FISTULA SURGICALLY CREATED: Status: ACTIVE | Noted: 2018-05-26

## 2018-07-26 ENCOUNTER — SURGERY (OUTPATIENT)
Age: 53
End: 2018-07-26

## 2018-08-02 ENCOUNTER — HOSPITAL ENCOUNTER (OUTPATIENT)
Facility: OTHER | Age: 53
Discharge: HOME | End: 2018-08-02
Attending: INTERNAL MEDICINE | Admitting: INTERNAL MEDICINE
Payer: MEDICAID

## 2018-08-02 ENCOUNTER — SURGERY (OUTPATIENT)
Age: 53
End: 2018-08-02

## 2018-08-02 VITALS
HEART RATE: 94 BPM | DIASTOLIC BLOOD PRESSURE: 78 MMHG | WEIGHT: 130 LBS | RESPIRATION RATE: 16 BRPM | OXYGEN SATURATION: 98 % | HEIGHT: 66 IN | SYSTOLIC BLOOD PRESSURE: 112 MMHG | TEMPERATURE: 98 F | BODY MASS INDEX: 20.89 KG/M2

## 2018-08-02 DIAGNOSIS — T82.590A MECHANICAL COMPLICATION OF ARTERIOVENOUS FISTULA SURGICALLY CREATED, INITIAL ENCOUNTER: ICD-10-CM

## 2018-08-02 DIAGNOSIS — T82.599A MECHANICAL COMPLICATION OF VASCULAR DEVICE: ICD-10-CM

## 2018-08-02 PROCEDURE — C1725 CATH, TRANSLUMIN NON-LASER: HCPCS | Performed by: INTERNAL MEDICINE

## 2018-08-02 PROCEDURE — C1769 GUIDE WIRE: HCPCS | Performed by: INTERNAL MEDICINE

## 2018-08-02 PROCEDURE — 25000003 PHARM REV CODE 250

## 2018-08-02 PROCEDURE — 36902 INTRO CATH DIALYSIS CIRCUIT: CPT | Performed by: INTERNAL MEDICINE

## 2018-08-02 PROCEDURE — 99153 MOD SED SAME PHYS/QHP EA: CPT | Performed by: INTERNAL MEDICINE

## 2018-08-02 PROCEDURE — 99152 MOD SED SAME PHYS/QHP 5/>YRS: CPT | Performed by: INTERNAL MEDICINE

## 2018-08-02 PROCEDURE — C2623 CATH, TRANSLUMIN, DRUG-COAT: HCPCS | Performed by: INTERNAL MEDICINE

## 2018-08-02 PROCEDURE — 25500020 PHARM REV CODE 255

## 2018-08-02 PROCEDURE — 27201059 HC S ENCORE INFLATION DEVICE: Performed by: INTERNAL MEDICINE

## 2018-08-02 PROCEDURE — 63600175 PHARM REV CODE 636 W HCPCS: Performed by: INTERNAL MEDICINE

## 2018-08-02 PROCEDURE — C1894 INTRO/SHEATH, NON-LASER: HCPCS | Performed by: INTERNAL MEDICINE

## 2018-08-02 PROCEDURE — 63600175 PHARM REV CODE 636 W HCPCS

## 2018-08-02 RX ORDER — FENTANYL CITRATE 50 UG/ML
INJECTION, SOLUTION INTRAMUSCULAR; INTRAVENOUS
Status: DISCONTINUED | OUTPATIENT
Start: 2018-08-02 | End: 2018-08-02 | Stop reason: HOSPADM

## 2018-08-02 RX ORDER — LIDOCAINE HYDROCHLORIDE 10 MG/ML
INJECTION INFILTRATION; PERINEURAL
Status: DISCONTINUED | OUTPATIENT
Start: 2018-08-02 | End: 2018-08-02 | Stop reason: HOSPADM

## 2018-08-02 RX ORDER — MIDAZOLAM HYDROCHLORIDE 1 MG/ML
INJECTION, SOLUTION INTRAMUSCULAR; INTRAVENOUS
Status: DISCONTINUED | OUTPATIENT
Start: 2018-08-02 | End: 2018-08-02 | Stop reason: HOSPADM

## 2018-08-02 RX ADMIN — MIDAZOLAM 1.5 MG: 1 INJECTION INTRAMUSCULAR; INTRAVENOUS at 11:08

## 2018-08-02 RX ADMIN — LIDOCAINE HYDROCHLORIDE 4 ML: 10 INJECTION INFILTRATION; PERINEURAL at 01:08

## 2018-08-02 RX ADMIN — FENTANYL CITRATE 25 MCG: 50 INJECTION, SOLUTION INTRAMUSCULAR; INTRAVENOUS at 11:08

## 2018-08-02 RX ADMIN — FENTANYL CITRATE 75 MCG: 50 INJECTION, SOLUTION INTRAMUSCULAR; INTRAVENOUS at 11:08

## 2018-08-02 RX ADMIN — MIDAZOLAM 0.5 MG: 1 INJECTION INTRAMUSCULAR; INTRAVENOUS at 11:08

## 2018-08-02 NOTE — BRIEF OP NOTE
[facility]  Brief Operative Note     SUMMARY     Surgery Date: 8/2/2018     Surgeon(s) and Role:     * James Torres MD - Primary    Assisting Surgeon: Dick Venegas MD    Pre-op Diagnosis:  ESRD (end stage renal disease) on dialysis [N18.6, Z99.2]    Post-op Diagnosis:  Post-Op Diagnosis Codes:     * ESRD (end stage renal disease) on dialysis [N18.6, Z99.2]    Procedure(s) (LRB):  FISTULOGRAM (Right)    Anesthesia: 1% lidocaine, 2 mg versed, 100 mcg fentanyl    Description of the findings of the procedure: Patient was prepped and draped in a sterile fashion.  This was a successful fistulogram of his RUE brachiocephalic AV fistula with angioplasty performed in the cephalic arch and juxta-anastomosis portion of the fistula.  Patient tolerated the procedure well and no immediate complications noted.    Findings/Key Components: Patient was prepped and draped in a sterile fashion.  This was a successful fistulogram of his RUE brachiocephalic AV fistula with angioplasty performed in the cephalic arch and juxta-anastomosis portion of the fistula.  Patient tolerated the procedure well and no immediate complications noted.      Estimated Blood Loss: 25 mL         Specimens:   Specimen (12h ago through future)    None          Discharge Note    SUMMARY     Admit Date: 8/2/2018    Discharge Date and Time:  08/02/2018 1:10 PM    Hospital Course (synopsis of major diagnoses, care, treatment, and services provided during the course of the hospital stay): Patient was prepped and draped in a sterile fashion.  This was a successful fistulogram of his RUE brachiocephalic AV fistula with angioplasty performed in the cephalic arch and juxta-anastomosis portion of the fistula.  Patient tolerated the procedure well and no immediate complications noted.       Final Diagnosis: Post-Op Diagnosis Codes:     * ESRD (end stage renal disease) on dialysis [N18.6, Z99.2]    Disposition: Home or Self Care    Follow  Up/Patient Instructions:     Medications:  Reconciled Home Medications:      Medication List      CONTINUE taking these medications    acyclovir 200 MG capsule  Commonly known as:  ZOVIRAX  Take 4 capsules (800 mg total) by mouth 3 (three) times daily.     amLODIPine 10 MG tablet  Commonly known as:  NORVASC  Take 1 tablet (10 mg total) by mouth once daily.     HYDROcodone-acetaminophen 5-325 mg per tablet  Commonly known as:  NORCO  Take 1 tablet by mouth every 4 (four) hours as needed for Pain.     levETIRAcetam 250 MG Tab  Commonly known as:  KEPPRA  Take 2 tablets (500 mg total) by mouth 2 (two) times daily.     pantoprazole 40 MG tablet  Commonly known as:  PROTONIX  Take 1 tablet (40 mg total) by mouth once daily.     sevelamer carbonate 800 mg Tab  Commonly known as:  RENVELA  Take 2 tablets (1,600 mg total) by mouth 3 (three) times daily with meals.            Discharge Procedure Orders  Lifting restrictions   Order Comments: No heavy lifting for 48 hours.     Call MD for:  temperature >100.4     Call MD for:  severe uncontrolled pain     Call MD for:  redness, tenderness, or signs of infection (pain, swelling, redness, odor or green/yellow discharge around incision site)     Call MD for:   Order Comments: Bleeding from the access site.     Activity as tolerated       Follow-up Information     [facility] In 1 day.    Specialty:  Dialysis Center  Why:  for dialysis  Contact information:  [address] [facility] [address] [phone]
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
## 2018-08-02 NOTE — PROCEDURES
[facility] Interventional Nephrology Service    Fistulogram Procedure Report    Date of Procedure:  08/02/2018 1:13 PM    Procedures Performed: Fistulogram with venous angioplasty of the cephalic arch and JA stenosis, reflux arteriogram    Indication: Increased venous pressure alarms    Referring Provider: [facility]    Primary Surgeon: James Torres MD  Assist: Dick Venegas MD    Medications Administered:  1% lidocaine, 2 mg versed, 100 mcg fentanyl    Procedure Report in Detail:  After informed consent was obtained the patient was prepped and draped in the usual sterile fashion.  His right upper Bracheocephalic AVF was cannulated in the venous facing direction with a micropuncture system, confirmed in correct placement under fluroscopy, and a fistulogram was performed using iodinated contrast.  Fistulogram revealed >95% stenosis at the cephalic arch.  Central vasculature was then evaluated with contrasted film revealing widely patent central vasculature.  Reflux arteriogram was not attempted at this point.  The micropuncture sheath was cannulated with a glidewire which was advanced to the central circulation, and the micropuncture sheath was removed.  A 7 Fr sheath was inserted over the glidewire, and the following interventions were performed after the patient was given 1 mg versed and 50 mcg fentanyl: using a 8 x 80 mm conquest pta balloon and endeflator, we advanced the balloon to the cephalic arch and inflated to full effacement with waist seen on balloon prior to full effacement.  We held this balloon fully effaced for 3 minutes.  Follow up film revealed sluggish flow of contrast and some improvement in the stenosis, although there was some residual stenosis.  So we decided to reattempt angioplasty at the cephalic arch with a 9 x 40 mm conquest pta ballloon and so we advanced the balloon to the cephalic arch and inflated the balloon to full effacement with waist seen prior to full  effacement.  This was held fully effaced for 1 minute.  A repeat venogram was attempted which again had sluggish flow, with much improvement in the stenosis with no evidence of extravasation, although the sheath had to be removed to clear the contrast.  We then opted to use a 9 x 60 mm lutonix drug-coated balloon at the same cephalic arch stenosis, so we advanced the DCB to the cephalic arch and inflated the balloon to nominal pressure, fully effaced, and held this open for 3 minutes.  Prior to this, the patient was still experiencing pain after being evaluated by MD, so an additional dose of 1 mg versed and 50 mcg of fentanyl was given.  Venogram was attempted again and again demonstrated sluggish flow (which improved when sheath was removed) with no evidence of extravasation.  At this point, the venous-facing sheath was pulled, a #2 ethilon suture was used to achieve hemostasis.      On exam, it was determined that his fistula still had very sluggish flow (minimal thrill and no pulsatility) and so we took a look with ultrasound (with doppler flow) which showed significant stenosis at the juxta-anastomosis region.  So we made the decision to cannulate distal to the mid-fistula stents in the arterial-facing direction, I used the ultrasound to guide our cannulation with the micro-puncture set.  We used fluoroscopy to ensure we were within the fistula and advanced the wire to the brachial artery past the anastomosis.  We then placed a 6 Bengali sheath and advanced a 6 x 80 mm ultraverse pta balloon to the juxta-anastomosis region and inflated the balloon to near full effacement and kept this inflated for 2 minutes.  We then retracted the balloon and inflated to nominal pressure and then injected contrast for a reflux arteriogram and the juxta-anastomosis stenosis looked improved with no evidence of extravasation.  Given the fact that the balloon did not fully efface, we opted to use a 6 x 40 mm mustang pta balloon and  advanced to the JA stenosis and inflated the balloon to full effacement with waist seen prior to full effacement.  We then retracted this balloon and inflated to nominal pressure and performed another reflux arteriogram which showed improvement in the stenosis with no evidence of extravasation.  The exam was improved with good flow felt further up the fistula than before the procedure began.    So the arterial-facing sheath was removed and a #2 ethilon suture was used to achieve hemostasis.  At this point, it appeared a hematoma was forming at the location of the arterial-facing sheath, so ultrasound was used to evaluate and there was no active extravasation to the hematoma, and so direct pressure was held for approximately 2 minutes, and there was no expansion of the hematoma.      No complications occurred during the procedure.     Estimated blood loss: 25 mL   Estimated contrast used: 32 mL    Impression/Plan:  This was a successful fistulogram with angioplasty performed in the cephalic arch (including DCB) and JA region of the fistula.  We will have the patient return in 1 week for suture removal and follow up exam.  Please do refer the patient back if there are any further signs of stenosis (increased venous pressure alarms, prolonged bleeding or worsening adequacy).      James Torres MD  [phone]

## 2018-08-02 NOTE — PROGRESS NOTES
[facility] Interventional Nephrology Progress Note    Post-procedural note    We were called by the transferring nurse to the post-operative floor, and it was discovered that his access was clotted (no bruit or thrill).  So he was brought back downstairs and we evaluated the fistula with ultrasound (and doppler), which showed no flow and showed large clot burden throughout the entire fistula.      I discussed the case with Dr. Apple, who will evaluate him tomorrow and take him to the OR for open thrombectomy vs graft placement (perhaps even accuseal), to try to avoid catheter placement.  The patient was advised to avoid eating and drinking after midnight tonight and to report downstairs here at 8 am.  He was in agreement.  This fistula is over 4 years old, and given the recurrent nature of these stenoses, the lifespan of his fistula is likely nearing its end anyways.      James Torres MD

## 2018-08-02 NOTE — PLAN OF CARE
Upon arrival for phase II, patient thrill and bruit noted to be absent.  Cath lab nurse called MD and patient transported back to specials and declot to be performed

## 2018-08-02 NOTE — OR NURSING
Patient was brought up from special procedures.  His procedure was cancelled.  VS were taken and are WNL.  He is being discharged from the ACU with NAD noted.

## 2018-08-03 ENCOUNTER — HOSPITAL ENCOUNTER (OUTPATIENT)
Facility: OTHER | Age: 53
LOS: 1 days | Discharge: HOME | End: 2018-08-03
Attending: SPECIALIST | Admitting: SPECIALIST
Payer: MEDICAID

## 2018-08-03 ENCOUNTER — ANESTHESIA EVENT (OUTPATIENT)
Dept: SURGERY | Facility: OTHER | Age: 53
End: 2018-08-03
Payer: MEDICAID

## 2018-08-03 ENCOUNTER — ANESTHESIA (OUTPATIENT)
Dept: SURGERY | Facility: OTHER | Age: 53
End: 2018-08-03
Payer: MEDICAID

## 2018-08-03 VITALS
OXYGEN SATURATION: 99 % | RESPIRATION RATE: 16 BRPM | BODY MASS INDEX: 20.89 KG/M2 | SYSTOLIC BLOOD PRESSURE: 101 MMHG | TEMPERATURE: 98 F | HEART RATE: 82 BPM | HEIGHT: 66 IN | WEIGHT: 130 LBS | DIASTOLIC BLOOD PRESSURE: 62 MMHG

## 2018-08-03 DIAGNOSIS — Z99.2 ESRD (END STAGE RENAL DISEASE) ON DIALYSIS: ICD-10-CM

## 2018-08-03 DIAGNOSIS — N18.6 ESRD (END STAGE RENAL DISEASE) ON DIALYSIS: ICD-10-CM

## 2018-08-03 LAB
ANION GAP SERPL CALC-SCNC: 17 MMOL/L
BASOPHILS # BLD AUTO: 0.01 K/UL
BASOPHILS NFR BLD: 0.2 %
BUN SERPL-MCNC: 42 MG/DL
CALCIUM SERPL-MCNC: 10.8 MG/DL
CHLORIDE SERPL-SCNC: 96 MMOL/L
CO2 SERPL-SCNC: 24 MMOL/L
CREAT SERPL-MCNC: 11.7 MG/DL
DIFFERENTIAL METHOD: ABNORMAL
EOSINOPHIL # BLD AUTO: 0.4 K/UL
EOSINOPHIL NFR BLD: 6.8 %
ERYTHROCYTE [DISTWIDTH] IN BLOOD BY AUTOMATED COUNT: 14.1 %
EST. GFR  (AFRICAN AMERICAN): 5 ML/MIN/1.73 M^2
EST. GFR  (NON AFRICAN AMERICAN): 4 ML/MIN/1.73 M^2
GLUCOSE SERPL-MCNC: 75 MG/DL
HCT VFR BLD AUTO: 38.9 %
HGB BLD-MCNC: 12.6 G/DL
LYMPHOCYTES # BLD AUTO: 1.7 K/UL
LYMPHOCYTES NFR BLD: 32.1 %
MCH RBC QN AUTO: 32.9 PG
MCHC RBC AUTO-ENTMCNC: 32.4 G/DL
MCV RBC AUTO: 102 FL
MONOCYTES # BLD AUTO: 0.4 K/UL
MONOCYTES NFR BLD: 6.6 %
NEUTROPHILS # BLD AUTO: 2.9 K/UL
NEUTROPHILS NFR BLD: 54.1 %
PLATELET # BLD AUTO: 230 K/UL
PMV BLD AUTO: 9.9 FL
POTASSIUM SERPL-SCNC: 5 MMOL/L
POTASSIUM SERPL-SCNC: 6.4 MMOL/L
RBC # BLD AUTO: 3.83 M/UL
SODIUM SERPL-SCNC: 137 MMOL/L
WBC # BLD AUTO: 5.32 K/UL

## 2018-08-03 PROCEDURE — 00532 ANES ACCESS CTR VENOUS CRCJ: CPT | Performed by: SPECIALIST

## 2018-08-03 PROCEDURE — 85025 COMPLETE CBC W/AUTO DIFF WBC: CPT

## 2018-08-03 PROCEDURE — 63600175 PHARM REV CODE 636 W HCPCS: Performed by: NURSE ANESTHETIST, CERTIFIED REGISTERED

## 2018-08-03 PROCEDURE — 84132 ASSAY OF SERUM POTASSIUM: CPT

## 2018-08-03 PROCEDURE — 37000008 HC ANESTHESIA 1ST 15 MINUTES: Performed by: SPECIALIST

## 2018-08-03 PROCEDURE — 71000033 HC RECOVERY, INTIAL HOUR: Performed by: SPECIALIST

## 2018-08-03 PROCEDURE — 25000003 PHARM REV CODE 250: Performed by: SPECIALIST

## 2018-08-03 PROCEDURE — 63600175 PHARM REV CODE 636 W HCPCS: Performed by: SPECIALIST

## 2018-08-03 PROCEDURE — C1757 CATH, THROMBECTOMY/EMBOLECT: HCPCS | Performed by: SPECIALIST

## 2018-08-03 PROCEDURE — 37000009 HC ANESTHESIA EA ADD 15 MINS: Performed by: SPECIALIST

## 2018-08-03 PROCEDURE — 25000003 PHARM REV CODE 250: Performed by: NURSE ANESTHETIST, CERTIFIED REGISTERED

## 2018-08-03 PROCEDURE — 80048 BASIC METABOLIC PNL TOTAL CA: CPT

## 2018-08-03 PROCEDURE — 36000707: Performed by: SPECIALIST

## 2018-08-03 PROCEDURE — 36415 COLL VENOUS BLD VENIPUNCTURE: CPT

## 2018-08-03 PROCEDURE — 71000015 HC POSTOP RECOV 1ST HR: Performed by: SPECIALIST

## 2018-08-03 PROCEDURE — 36000706: Performed by: SPECIALIST

## 2018-08-03 PROCEDURE — 27201423 OPTIME MED/SURG SUP & DEVICES STERILE SUPPLY: Performed by: SPECIALIST

## 2018-08-03 PROCEDURE — S0077 INJECTION, CLINDAMYCIN PHOSP: HCPCS | Performed by: SPECIALIST

## 2018-08-03 PROCEDURE — C1750 CATH, HEMODIALYSIS,LONG-TERM: HCPCS | Performed by: SPECIALIST

## 2018-08-03 DEVICE — CATH EQUISTREAM 23CM: Type: IMPLANTABLE DEVICE | Site: NECK | Status: FUNCTIONAL

## 2018-08-03 RX ORDER — PROPOFOL 10 MG/ML
VIAL (ML) INTRAVENOUS
Status: DISCONTINUED | OUTPATIENT
Start: 2018-08-03 | End: 2018-08-03

## 2018-08-03 RX ORDER — MEPERIDINE HYDROCHLORIDE 50 MG/ML
12.5 INJECTION INTRAMUSCULAR; INTRAVENOUS; SUBCUTANEOUS ONCE AS NEEDED
Status: DISCONTINUED | OUTPATIENT
Start: 2018-08-03 | End: 2018-08-03 | Stop reason: HOSPADM

## 2018-08-03 RX ORDER — FENTANYL CITRATE 50 UG/ML
INJECTION, SOLUTION INTRAMUSCULAR; INTRAVENOUS
Status: DISCONTINUED | OUTPATIENT
Start: 2018-08-03 | End: 2018-08-03

## 2018-08-03 RX ORDER — EPHEDRINE SULFATE 50 MG/ML
INJECTION, SOLUTION INTRAVENOUS
Status: DISCONTINUED | OUTPATIENT
Start: 2018-08-03 | End: 2018-08-03

## 2018-08-03 RX ORDER — HYDROCODONE BITARTRATE AND ACETAMINOPHEN 7.5; 325 MG/1; MG/1
1 TABLET ORAL EVERY 6 HOURS PRN
Qty: 30 TABLET | Refills: 0 | Status: ON HOLD | OUTPATIENT
Start: 2018-08-03 | End: 2018-11-26 | Stop reason: HOSPADM

## 2018-08-03 RX ORDER — HEPARIN SODIUM 10000 [USP'U]/ML
INJECTION, SOLUTION INTRAVENOUS; SUBCUTANEOUS
Status: DISCONTINUED | OUTPATIENT
Start: 2018-08-03 | End: 2018-08-03 | Stop reason: HOSPADM

## 2018-08-03 RX ORDER — CLINDAMYCIN PHOSPHATE 900 MG/50ML
900 INJECTION, SOLUTION INTRAVENOUS
Status: DISCONTINUED | OUTPATIENT
Start: 2018-08-03 | End: 2018-08-03 | Stop reason: HOSPADM

## 2018-08-03 RX ORDER — FENTANYL CITRATE 50 UG/ML
25 INJECTION, SOLUTION INTRAMUSCULAR; INTRAVENOUS EVERY 5 MIN PRN
Status: DISCONTINUED | OUTPATIENT
Start: 2018-08-03 | End: 2018-08-03 | Stop reason: HOSPADM

## 2018-08-03 RX ORDER — MIDAZOLAM HYDROCHLORIDE 1 MG/ML
INJECTION INTRAMUSCULAR; INTRAVENOUS
Status: DISCONTINUED | OUTPATIENT
Start: 2018-08-03 | End: 2018-08-03

## 2018-08-03 RX ORDER — ONDANSETRON 8 MG/1
8 TABLET, ORALLY DISINTEGRATING ORAL EVERY 8 HOURS PRN
Status: DISCONTINUED | OUTPATIENT
Start: 2018-08-03 | End: 2018-08-03 | Stop reason: HOSPADM

## 2018-08-03 RX ORDER — ONDANSETRON 2 MG/ML
4 INJECTION INTRAMUSCULAR; INTRAVENOUS DAILY PRN
Status: DISCONTINUED | OUTPATIENT
Start: 2018-08-03 | End: 2018-08-03 | Stop reason: HOSPADM

## 2018-08-03 RX ORDER — SODIUM CHLORIDE 0.9 % (FLUSH) 0.9 %
3 SYRINGE (ML) INJECTION
Status: DISCONTINUED | OUTPATIENT
Start: 2018-08-03 | End: 2018-08-03 | Stop reason: HOSPADM

## 2018-08-03 RX ORDER — OXYCODONE HYDROCHLORIDE 5 MG/1
5 TABLET ORAL
Status: DISCONTINUED | OUTPATIENT
Start: 2018-08-03 | End: 2018-08-03 | Stop reason: HOSPADM

## 2018-08-03 RX ORDER — HYDROCODONE BITARTRATE AND ACETAMINOPHEN 5; 325 MG/1; MG/1
1 TABLET ORAL EVERY 4 HOURS PRN
Status: DISCONTINUED | OUTPATIENT
Start: 2018-08-03 | End: 2018-08-03 | Stop reason: HOSPADM

## 2018-08-03 RX ORDER — SODIUM CHLORIDE 9 MG/ML
INJECTION, SOLUTION INTRAVENOUS CONTINUOUS PRN
Status: DISCONTINUED | OUTPATIENT
Start: 2018-08-03 | End: 2018-08-03

## 2018-08-03 RX ORDER — LIDOCAINE HCL/PF 100 MG/5ML
SYRINGE (ML) INTRAVENOUS
Status: DISCONTINUED | OUTPATIENT
Start: 2018-08-03 | End: 2018-08-03

## 2018-08-03 RX ADMIN — FENTANYL CITRATE 50 MCG: 50 INJECTION, SOLUTION INTRAMUSCULAR; INTRAVENOUS at 08:08

## 2018-08-03 RX ADMIN — MIDAZOLAM HYDROCHLORIDE 2 MG: 1 INJECTION, SOLUTION INTRAMUSCULAR; INTRAVENOUS at 06:08

## 2018-08-03 RX ADMIN — CLINDAMYCIN PHOSPHATE 900 MG: 18 INJECTION, SOLUTION INTRAVENOUS at 06:08

## 2018-08-03 RX ADMIN — FENTANYL CITRATE 100 MCG: 50 INJECTION, SOLUTION INTRAMUSCULAR; INTRAVENOUS at 06:08

## 2018-08-03 RX ADMIN — FENTANYL CITRATE 50 MCG: 50 INJECTION, SOLUTION INTRAMUSCULAR; INTRAVENOUS at 07:08

## 2018-08-03 RX ADMIN — CARBOXYMETHYLCELLULOSE SODIUM 2 DROP: 2.5 SOLUTION/ DROPS OPHTHALMIC at 06:08

## 2018-08-03 RX ADMIN — EPHEDRINE SULFATE 15 MG: 50 INJECTION INTRAMUSCULAR; INTRAVENOUS; SUBCUTANEOUS at 07:08

## 2018-08-03 RX ADMIN — PROPOFOL 200 MG: 10 INJECTION, EMULSION INTRAVENOUS at 06:08

## 2018-08-03 RX ADMIN — LIDOCAINE HYDROCHLORIDE 75 MG: 20 INJECTION, SOLUTION INTRAVENOUS at 06:08

## 2018-08-03 RX ADMIN — EPHEDRINE SULFATE 20 MG: 50 INJECTION INTRAMUSCULAR; INTRAVENOUS; SUBCUTANEOUS at 08:08

## 2018-08-03 RX ADMIN — SODIUM CHLORIDE: 0.9 INJECTION, SOLUTION INTRAVENOUS at 06:08

## 2018-08-03 RX ADMIN — EPHEDRINE SULFATE 15 MG: 50 INJECTION INTRAMUSCULAR; INTRAVENOUS; SUBCUTANEOUS at 06:08

## 2018-08-03 NOTE — ANESTHESIA PREPROCEDURE EVALUATION
08/03/2018  Greyson Hudson III is a 52 y.o., male.    Anesthesia Evaluation    I have reviewed the Patient Summary Reports.    I have reviewed the Nursing Notes.   I have reviewed the Medications.     Review of Systems  Anesthesia Hx:  No problems with previous Anesthesia  History of prior surgery of interest to airway management or planning: Denies Family Hx of Anesthesia complications.   Denies Personal Hx of Anesthesia complications.   Social:  Former Smoker    Hematology/Oncology:     Oncology Normal    -- Anemia: Hematology Comments: HIV positive    EENT/Dental:EENT/Dental Normal   Cardiovascular:   Exercise tolerance: poor Hypertension, poorly controlled PVD hyperlipidemia    Pulmonary:  Pulmonary Normal    Renal/:   Chronic Renal Disease, ESRD, Dialysis MWF dialysis   Musculoskeletal:   Arthritis     Neurological:   Seizures, poorly controlled    Endocrine:  Endocrine Normal    Dermatological:  Skin Normal    Psych:  Psychiatric Normal           Physical Exam  General:  Malnutrition    Airway/Jaw/Neck:  Airway Findings: Mouth Opening: Normal Tongue: Normal  Mallampati: II      Dental:  Dental Findings: Periodontal disease, Severe       Skin:  Skin Findings:  Shunt rt upper arm     Mental Status:  Mental Status Findings:  Cooperative, Alert and Oriented         Anesthesia Plan  Type of Anesthesia, risks & benefits discussed:  Anesthesia Type:  general  Patient's Preference:   Intra-op Monitoring Plan:   Intra-op Monitoring Plan Comments:   Post Op Pain Control Plan: multimodal analgesia  Post Op Pain Control Plan Comments:   Induction:   IV  Beta Blocker:         Informed Consent: Patient understands risks and agrees with Anesthesia plan.  Questions answered.   ASA Score: 3     Day of Surgery Review of History & Physical:    H&P update referred to the surgeon.     Anesthesia Plan Notes: HIV  positive,seizures,ESRD,Potassium 5.0 today        Ready For Surgery From Anesthesia Perspective.

## 2018-08-03 NOTE — DISCHARGE INSTRUCTIONS

## 2018-08-03 NOTE — OR NURSING
Patient resting quietly in room and waiting for surgery.  Patient understands about the need to stay for surgery so he can be dialyzed, but does not want to wait.  He will think about staying and call me on the call light if he decides on leaving.

## 2018-08-03 NOTE — PLAN OF CARE
Dr. Apple at bedside.  Apologized for delay in surgery, but patient needs to have surgery to get dialysis.
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
## 2018-08-03 NOTE — OR NURSING
Patient aggravated and wanting to know what the delay is for surgery. Explained to the patient that the Surgeon is in another case and the case is running longing than scheduled.  Patient wanting to reschedule for another day, Will call Surgeon in the OR room and inquire about time delay and possible reschedule.

## 2018-08-03 NOTE — PLAN OF CARE
Had lengthy discussion with patient and his  Rita (via cell phone) regarding patient's situation with the delay of surgery.  Transportation will not be available after 6 pm and they are not available on weekends.  Patient and Rita were instructed that the delay in surgery is related to a case ahead of him (Apple) that has encountered complications.  Instructed Lyndsay Tristan and Rita that the complications cannot be foreseen and that we are doing our best to accommodate him.  We are unable to reschedule the surgery for Dr. Apple, as we do not know his availabilities and it is imperative that Mr. Hudson not miss dialysis.  Due to his transport situation, he was given the option to possibly stay overnight, but he does not wish to do this.  Rita has arranged for a cab to pick Mr. Hudson up, however, need to make sure that there will be a responsible adult to accompany Mr. Hudson for is safety

## 2018-08-03 NOTE — OR NURSING
Phlebotomist here to do potassium re-draw. Patient states he does not want to have it done and wants to leave. Patient states he should have never been told to get here for 8:30 and he wants to leave. Explained to patient that it was very important that he have this procedure done so he can be dialyzed and that Dr. Apple did not plan to take this long bringing him to surgery. Patient reluctantly states he will allow the phlebotomist to do the re-draw.

## 2018-08-03 NOTE — PLAN OF CARE
Anesthesia stated ok to discharge home with cab and to Rebekah.  Rebekah gave her number [phone].  Need to see who will pay for the cab, going to find out.

## 2018-08-03 NOTE — OR NURSING
Surgeon does not know what time he may finish with his first case.  Patient does not want to stay for surgery, discussed with the patient that he needs to stay due to his needing to be dialyzed.  Patient frustrated but decided to stay.

## 2018-08-03 NOTE — OR NURSING
Patient is very agitated, states he has no idea what is being done to him today but he was here yesterday and he wants to go home. Attempted to exp[laion to patient that he would not be able to receive dialysis if he does not get this procedure done.

## 2018-08-03 NOTE — PLAN OF CARE
Called Rita () to let her know that per policy, we require a responsible adult to ride with Mr. Hudson in the car home.  She stated that we can call Rebekah who will meet the cab and Mr. Hudson at [facility].  The cab that will be picking him up is [facility]- [phone].   Rebekah is the PCA (personal care attendant) - her # is [phone].  Rita will be leaving for the day so if there are any further needs we are to contact the in-house  Thong.  Thong's # is [phone].

## 2018-08-03 NOTE — OR NURSING
Lab called with critical potassium of 6.4, moderately hemolyzed. Called to unit by Victoria Gaytan. Dr. Apple notified, order received. Lab notified of need for re-draw.

## 2018-08-04 NOTE — TRANSFER OF CARE
"Anesthesia Transfer of Care Note    Patient: Greyson Hudson III    Procedure(s) Performed: Procedure(s) (LRB):  REVISION, PROCEDURE INVOLVING ARTERIOVENOUS GRAFT (Right)  INSERTION,CATHETER,CENTRAL VENOUS,HEMODIALYSIS,TUNNELED / ANTON CATHETER (Right)    Patient location: PACU    Anesthesia Type: general    Transport from OR: Transported from OR on room air with adequate spontaneous ventilation    Post pain: adequate analgesia    Post assessment: no apparent anesthetic complications    Post vital signs: stable    Level of consciousness: responds to stimulation    Nausea/Vomiting: no nausea/vomiting    Complications: none    Transfer of care protocol was followed      Last vitals:   Visit Vitals  /78 (BP Location: Left arm, Patient Position: Sitting)   Pulse 66   Temp 36.8 °C (98.2 °F) (Oral)   Resp 16   Ht 5' 6" (1.676 m)   Wt 59 kg (130 lb)   SpO2 100%   BMI 20.98 kg/m²     "

## 2018-08-04 NOTE — ANESTHESIA POSTPROCEDURE EVALUATION
"Anesthesia Post Evaluation    Patient: Greyson Tristan III    Procedure(s) Performed: Procedure(s) (LRB):  REVISION, PROCEDURE INVOLVING ARTERIOVENOUS GRAFT (Right)  INSERTION,CATHETER,CENTRAL VENOUS,HEMODIALYSIS,TUNNELED / ANTON CATHETER (Right)    Final Anesthesia Type: general  Patient location during evaluation: PACU  Patient participation: Yes- Able to Participate  Level of consciousness: awake and alert  Post-procedure vital signs: reviewed and stable  Pain management: adequate  Airway patency: patent  PONV status at discharge: No PONV  Anesthetic complications: no      Cardiovascular status: blood pressure returned to baseline  Respiratory status: unassisted  Hydration status: euvolemic  Follow-up not needed.        Visit Vitals  /71   Pulse 86   Temp 36.7 °C (98 °F)   Resp 18   Ht 5' 6" (1.676 m)   Wt 59 kg (130 lb)   SpO2 96%   BMI 20.98 kg/m²       Pain/Steph Score: Pain Assessment Performed: Yes (8/2/2018  9:07 AM)  Presence of Pain: denies (8/3/2018  9:03 PM)  Steph Score: 9 (8/3/2018  9:03 PM)      "

## 2018-08-04 NOTE — PROGRESS NOTES
[facility]  Brief Operative Note     SUMMARY     Surgery Date: 8/3/2018     Surgeon(s) and Role:     * Jeremiah Apple Jr., MD - Primary    Assisting Surgeon: None    Pre-op Diagnosis:  Malfunction of arteriovenous graft, initial encounter [T82.590A]    Post-op Diagnosis:  Post-Op Diagnosis Codes:     * Malfunction of arteriovenous graft, initial encounter [T82.590A]    Procedure(s) (LRB):  REVISION, PROCEDURE INVOLVING ARTERIOVENOUS GRAFT (Right)  INSERTION,CATHETER,CENTRAL VENOUS,HEMODIALYSIS,TUNNELED / ANTON CATHETER (Right)    Anesthesia: General    Description of the findings of the procedure: thrombectomy AVF'Lt IJ cuffed dialysis catheter    Findings/Key Components: unable to salvage AVF    Estimated Blood Loss: 150cc         Specimens:   Specimen (12h ago through future)    None          Discharge Note    SUMMARY     Admit Date: 8/3/2018    Discharge Date and Time:  08/03/2018 8:49 PM    Hospital Course (synopsis of major diagnoses, care, treatment, and services provided during the course of the hospital stay): benign     Final Diagnosis: Post-Op Diagnosis Codes:     * Malfunction of arteriovenous graft, initial encounter [T82.590A]    Disposition: Home or Self Care    Follow Up/Patient Instructions:     Medications:  Reconciled Home Medications:      Medication List      CHANGE how you take these medications    * HYDROcodone-acetaminophen 5-325 mg per tablet  Commonly known as:  NORCO  Take 1 tablet by mouth every 4 (four) hours as needed for Pain.  What changed:  Another medication with the same name was added. Make sure you understand how and when to take each.     * HYDROcodone-acetaminophen 7.5-325 mg per tablet  Commonly known as:  NORCO  Take 1 tablet by mouth every 6 (six) hours as needed for Pain.  What changed:  You were already taking a medication with the same name, and this prescription was added. Make sure you understand how and when to take each.        * This list has 2  medication(s) that are the same as other medications prescribed for you. Read the directions carefully, and ask your doctor or other care provider to review them with you.            CONTINUE taking these medications    acyclovir 200 MG capsule  Commonly known as:  ZOVIRAX  Take 4 capsules (800 mg total) by mouth 3 (three) times daily.     amLODIPine 10 MG tablet  Commonly known as:  NORVASC  Take 1 tablet (10 mg total) by mouth once daily.     levETIRAcetam 250 MG Tab  Commonly known as:  KEPPRA  Take 2 tablets (500 mg total) by mouth 2 (two) times daily.     pantoprazole 40 MG tablet  Commonly known as:  PROTONIX  Take 1 tablet (40 mg total) by mouth once daily.     sevelamer carbonate 800 mg Tab  Commonly known as:  RENVELA  Take 2 tablets (1,600 mg total) by mouth 3 (three) times daily with meals.            Discharge Procedure Orders  Diet general     Call MD for:  persistent nausea and vomiting     Call MD for:  severe uncontrolled pain     Call MD for:  redness, tenderness, or signs of infection (pain, swelling, redness, odor or green/yellow discharge around incision site)     Sponge bath only until clinic visit     Leave dressing on - Keep it clean, dry, and intact until clinic visit   Order Comments: Leave steri strips intact.     Activity as tolerated       Follow-up Information     Follow up In 1 week.

## 2018-08-04 NOTE — OR NURSING
Rebekah at [facility] notified that patient will be en route shortly via cab.  Full report given and Rebekah verbalized understanding that patient has discharge instructions with him as well as a pain prescription to be filled

## 2018-08-04 NOTE — OP NOTE
DATE OF PROCEDURE:  08/03/2018.    PREOPERATIVE DIAGNOSES:  1.  End-stage renal disease.  2.  Thrombosed AV fistula, right arm.    POSTOPERATIVE DIAGNOSES:  1.  End-stage renal disease.  2.  Thrombosed AV fistula, right arm.    PROCEDURE:  Thrombectomy of AV fistula, placement of cuffed dialysis catheter   via left internal jugular approach with ultrasound and fluoroscopy.    PROCEDURE IN DETAIL:  The patient was brought to the Operating Room, placed in   supine position.  Right upper extremity prepped and draped in a sterile fashion.    Transverse incision was made over the midportion of the graft, carried down   through subcutaneous tissues.  The graft was then opened through a longitudinal   incision.  The patient had had multiple stents in the graft.  There was only a   very small area, which they were using for access.  #5 and #6 Angeline catheters   were passed proximally and distally despite repeated attempts at thrombectomy.    This could not be undertaken and the balloon catheters rupture due to the   stents.  At this point, it was decided given the length of stents present in the   fistula that the fistula could not be salvaged.  The graft was then closed with   running #6 Kinards-Anthony, the subQ with interrupted 3-0 Vicryl, the skin with   interrupted 3-0 Vicryl, the arm sterilely dressed.  The head and neck prepped   and draped in a sterile fashion.  A 20-gauge vascular needle was inserted in the   left internal jugular vein and a guidewire advanced into the superior vena   cava.  Ultrasound and fluoroscopy were used to assist in this placement.    Counter incision was made in the chest wall.  A #23 HemoSplit catheter brought   retrograde through a subcutaneous tissue tunnel to exit the neck at the site of   the exit of the guidewire between the heads of the sternocleidomastoid on the   left.  Under fluoroscopic guidance, a Cook dilator was placed over the   guidewire.  The guidewire was removed.  The catheter  then advanced through the   Cook dilator and into the superior vena cava using fluoroscopic guidance.  The   platysma and subQ was closed with interrupted 3-0 Vicryl.  The catheter secured   with 2-0 silk and sterilely dressed then heparinized.  Chest x-ray showed the   catheter in good position without complication.      JJNOAH  dd: 08/03/2018 20:52:42 (CDT)  td: 08/03/2018 23:25:45 (CDT)  Doc ID   #[id number]  Job ID #[id number]    CC:

## 2018-08-09 ENCOUNTER — SURGERY (OUTPATIENT)
Age: 53
End: 2018-08-09

## 2018-08-20 RX ORDER — PANTOPRAZOLE SODIUM 40 MG/1
TABLET, DELAYED RELEASE ORAL
Qty: 30 TABLET | Refills: 0 | OUTPATIENT
Start: 2018-08-20

## 2018-09-13 RX ORDER — PANTOPRAZOLE SODIUM 40 MG/1
TABLET, DELAYED RELEASE ORAL
Qty: 30 TABLET | Refills: 0 | OUTPATIENT
Start: 2018-09-13

## 2018-10-09 ENCOUNTER — ANESTHESIA EVENT (OUTPATIENT)
Dept: SURGERY | Facility: OTHER | Age: 53
End: 2018-10-09

## 2018-10-09 ENCOUNTER — HOSPITAL ENCOUNTER (OUTPATIENT)
Dept: PREADMISSION TESTING | Facility: OTHER | Age: 53
Discharge: HOME | End: 2018-10-09
Attending: SPECIALIST
Payer: MEDICAID

## 2018-10-09 VITALS
HEART RATE: 75 BPM | TEMPERATURE: 97 F | BODY MASS INDEX: 21.53 KG/M2 | OXYGEN SATURATION: 100 % | HEIGHT: 66 IN | WEIGHT: 134 LBS

## 2018-10-09 RX ORDER — DAPSONE 100 MG/1
100 TABLET ORAL DAILY
Status: ON HOLD | COMMUNITY
End: 2021-02-16 | Stop reason: HOSPADM

## 2018-10-09 RX ORDER — LIDOCAINE HYDROCHLORIDE 10 MG/ML
0.5 INJECTION, SOLUTION EPIDURAL; INFILTRATION; INTRACAUDAL; PERINEURAL ONCE
Status: CANCELLED | OUTPATIENT
Start: 2018-10-09 | End: 2018-10-09

## 2018-10-09 RX ORDER — FLUOXETINE HYDROCHLORIDE 40 MG/1
40 CAPSULE ORAL DAILY
COMMUNITY

## 2018-10-09 RX ORDER — SODIUM CHLORIDE, SODIUM LACTATE, POTASSIUM CHLORIDE, CALCIUM CHLORIDE 600; 310; 30; 20 MG/100ML; MG/100ML; MG/100ML; MG/100ML
INJECTION, SOLUTION INTRAVENOUS CONTINUOUS
Status: CANCELLED | OUTPATIENT
Start: 2018-10-09

## 2018-10-09 NOTE — DISCHARGE INSTRUCTIONS
PRE-ADMIT TESTING -  [phone]    [address]  [place] [facility]          Your surgery has been scheduled at [facility]. We are pleased to have the opportunity to serve you. For Further Information please call [phone].    On the day of surgery please report to the Information Desk on the 1st floor.    · CONTACT YOUR PHYSICIAN'S OFFICE THE DAY PRIOR TO YOUR SURGERY TO OBTAIN YOUR ARRIVAL TIME.     · The evening before surgery do not eat anything after 9 p.m. ( this includes hard candy, chewing gum and mints).  You may only have GATORADE, POWERADE AND WATER  from 9 p.m. until you leave your home.   DO NOT DRINK ANY LIQUIDS ON THE WAY TO THE HOSPITAL.      SPECIAL MEDICATION INSTRUCTIONS: TAKE medications checked off by the Anesthesiologist on your Medication List.    Angiogram Patients: Take medications as instructed by your physician, including aspirin.     Surgery Patients:    If you take ASPIRIN - Your PHYSICIAN/SURGEON will need to inform you IF/OR when you need to stop taking aspirin prior to your surgery.     Do Not take any medications containing IBUPROFEN.  Do Not Wear any make-up or dark nail polish   (especially eye make-up) to surgery. If you come to surgery with makeup on you will be required to remove the makeup or nail polish.    Do not shave your surgical area at least 5 days prior to your surgery. The surgical prep will be performed at the hospital according to Infection Control regulations.    Leave all valuables at home.   Do Not wear any jewelry or watches, including any metal in body piercings.  Contact Lens must be removed before surgery. Either do not wear the contact lens or bring a case and solution for storage.  Please bring a container for eyeglasses or dentures as required.  Bring any paperwork your physician has provided, such as consent forms,  history and physicals, doctor's orders, etc.   Bring comfortable clothes that are loose fitting to wear upon  discharge. Take into consideration the type of surgery being performed.  Maintain your diet as advised per your physician the day prior to surgery.      Adequate rest the night before surgery is advised.   Park in the Parking lot behind the hospital or in the [facility] across the street from the parking lot. Parking is complimentary.  If you will be discharged the same day as your procedure, please arrange for a responsible adult to drive you home or to accompany you if traveling by taxi.   YOU WILL NOT BE PERMITTED TO DRIVE OR TO LEAVE THE HOSPITAL ALONE AFTER SURGERY.   It is strongly recommended that you arrange for someone to remain with you for the first 24 hrs following your surgery.       Thank you for your cooperation.  The Staff of [facility].        Bathing Instructions                                                                 Please shower the evening before and morning of your procedure with    ANTIBACTERIAL SOAP. ( DIAL, etc )  Concentrate on the surgical area   for at least 3 minutes and rinse completely. Dry off as usual.   Do not use any deodorant, powder, body lotions, perfume, after shave or    cologne.

## 2018-10-09 NOTE — ANESTHESIA PREPROCEDURE EVALUATION
10/09/2018  Greyson Hudson III is a 52 y.o., male.    Pre-op Assessment    I have reviewed the Patient Summary Reports.     I have reviewed the Nursing Notes.   I have reviewed the Medications.     Review of Systems  Anesthesia Hx:  No problems with previous Anesthesia  History of prior surgery of interest to airway management or planning: Previous anesthesia: General Recent revision and line placement for dialysis with general anesthesia.  Procedure performed at an [facility]. Denies Family Hx of Anesthesia complications.   Denies Personal Hx of Anesthesia complications.   Social:  Former Smoker    Hematology/Oncology:     Oncology Normal    -- Anemia: Hematology Comments: HIV positive    EENT/Dental:EENT/Dental Normal   Cardiovascular:   Exercise tolerance: poor Hypertension, poorly controlled PVD hyperlipidemia    Pulmonary:  Pulmonary Normal    Renal/:   Chronic Renal Disease, ESRD, Dialysis MWF dialysis   Musculoskeletal:   Arthritis     Neurological:   Seizures, poorly controlled    Endocrine:  Endocrine Normal    Dermatological:  Skin Normal    Psych:  Psychiatric Normal           Physical Exam  General:  Well nourished    Airway/Jaw/Neck:  Airway Findings: Mouth Opening: Normal Tongue: Normal  General Airway Assessment: Adult  Mallampati: II     Eyes/Ears/Nose:  Eyes/Ears/Nose Findings:    Dental:  Dental Findings: Periodontal disease, Severe        Mental Status:  Mental Status Findings:  Flat Affect         Anesthesia Plan  Type of Anesthesia, risks & benefits discussed:  Anesthesia Type:  general, regional  Patient's Preference:   Intra-op Monitoring Plan:   Intra-op Monitoring Plan Comments:   Post Op Pain Control Plan: multimodal analgesia  Post Op Pain Control Plan Comments:   Induction:   IV  Beta Blocker:         Informed Consent: Patient understands risks and agrees with Anesthesia plan.   Questions answered. Anesthesia consent signed with patient.  ASA Score: 3     Day of Surgery Review of History & Physical:    H&P update referred to the surgeon.     Anesthesia Plan Notes: Labs am of surg,MWF dialysis w ANTON,discussed GA and Regional,HIV positive

## 2018-10-11 ENCOUNTER — ANESTHESIA (OUTPATIENT)
Dept: SURGERY | Facility: OTHER | Age: 53
End: 2018-10-11

## 2018-10-11 ENCOUNTER — HOSPITAL ENCOUNTER (OUTPATIENT)
Facility: OTHER | Age: 53
Discharge: HOME | End: 2018-10-11
Attending: SPECIALIST | Admitting: SPECIALIST
Payer: MEDICAID

## 2018-10-11 VITALS
TEMPERATURE: 99 F | OXYGEN SATURATION: 100 % | DIASTOLIC BLOOD PRESSURE: 79 MMHG | WEIGHT: 134 LBS | BODY MASS INDEX: 21.53 KG/M2 | HEIGHT: 66 IN | HEART RATE: 78 BPM | SYSTOLIC BLOOD PRESSURE: 129 MMHG | RESPIRATION RATE: 16 BRPM

## 2018-10-11 LAB
ANION GAP SERPL CALC-SCNC: 10 MMOL/L
BASOPHILS # BLD AUTO: 0.01 K/UL
BASOPHILS NFR BLD: 0.2 %
BUN SERPL-MCNC: 39 MG/DL
CALCIUM SERPL-MCNC: 10.5 MG/DL
CHLORIDE SERPL-SCNC: 99 MMOL/L
CO2 SERPL-SCNC: 30 MMOL/L
CREAT SERPL-MCNC: 10 MG/DL
DIFFERENTIAL METHOD: ABNORMAL
EOSINOPHIL # BLD AUTO: 0.2 K/UL
EOSINOPHIL NFR BLD: 5.5 %
ERYTHROCYTE [DISTWIDTH] IN BLOOD BY AUTOMATED COUNT: 13.9 %
EST. GFR  (AFRICAN AMERICAN): 6 ML/MIN/1.73 M^2
EST. GFR  (NON AFRICAN AMERICAN): 5 ML/MIN/1.73 M^2
GLUCOSE SERPL-MCNC: 96 MG/DL
HCT VFR BLD AUTO: 32.6 %
HGB BLD-MCNC: 10.4 G/DL
LYMPHOCYTES # BLD AUTO: 1.5 K/UL
LYMPHOCYTES NFR BLD: 33.9 %
MCH RBC QN AUTO: 31 PG
MCHC RBC AUTO-ENTMCNC: 31.9 G/DL
MCV RBC AUTO: 97 FL
MONOCYTES # BLD AUTO: 0.4 K/UL
MONOCYTES NFR BLD: 9.1 %
NEUTROPHILS # BLD AUTO: 2.3 K/UL
NEUTROPHILS NFR BLD: 51.3 %
PLATELET # BLD AUTO: 316 K/UL
PMV BLD AUTO: 9.4 FL
POTASSIUM SERPL-SCNC: 5.7 MMOL/L
POTASSIUM SERPL-SCNC: 5.9 MMOL/L
RBC # BLD AUTO: 3.35 M/UL
SODIUM SERPL-SCNC: 139 MMOL/L
WBC # BLD AUTO: 4.39 K/UL

## 2018-10-11 PROCEDURE — 85025 COMPLETE CBC W/AUTO DIFF WBC: CPT

## 2018-10-11 PROCEDURE — 80048 BASIC METABOLIC PNL TOTAL CA: CPT

## 2018-10-11 PROCEDURE — 84132 ASSAY OF SERUM POTASSIUM: CPT

## 2018-10-11 PROCEDURE — 36415 COLL VENOUS BLD VENIPUNCTURE: CPT

## 2018-10-11 RX ORDER — LIDOCAINE HYDROCHLORIDE 10 MG/ML
0.5 INJECTION, SOLUTION EPIDURAL; INFILTRATION; INTRACAUDAL; PERINEURAL ONCE
Status: DISCONTINUED | OUTPATIENT
Start: 2018-10-11 | End: 2018-10-11 | Stop reason: HOSPADM

## 2018-10-11 RX ORDER — SODIUM CHLORIDE, SODIUM LACTATE, POTASSIUM CHLORIDE, CALCIUM CHLORIDE 600; 310; 30; 20 MG/100ML; MG/100ML; MG/100ML; MG/100ML
INJECTION, SOLUTION INTRAVENOUS CONTINUOUS
Status: DISCONTINUED | OUTPATIENT
Start: 2018-10-11 | End: 2018-10-11 | Stop reason: HOSPADM

## 2018-10-11 NOTE — DISCHARGE INSTRUCTIONS
Arteriovenous (AV) Fistula for Dialysis  An AV fistula is a connection between an artery and a vein. For this procedure, an AV fistula is surgically created using an artery and a vein in your arm. (Your healthcare provider will let you know if another site is to be used.) When the artery and vein are joined, blood flow increases from the artery into the vein. As a result, the vein gets bigger over time. The enlarged vein provides easier access to the blood for a treatment for kidney failure (dialysis). This sheet explains the procedure and what to expect.       An AV fistula increases blood flow from the artery into the vein. Over time, the vein becomes stronger and enlarged.       Recovering at home  Once at home, follow all of the instructions youve been given. Be sure to:  · Take all medicines as directed.  · Care for your incision as instructed.  · Check for signs of infection at the incision site (see below).  · Avoid heavy lifting and strenuous activities as directed.  · Monitor and care for your fistula as instructed.  · Do your hand and arm exercises as instructed. This usually involves squeezing a ball in your hand for a few minutes each hour.    Call your healthcare provider if you have any of the following:  · Fever of 100.4°F (38°C) or higher  · Signs of infection at the incision site, such as increased redness or swelling, warmth, worsening pain, bleeding, or bad-smelling drainage  · You cant feel a thrill (the vibration of blood going through your arm)  · Pain or numbness in your fingers, hand, or arm  · Bleeding, redness, or warmth around your fistula  · Sudden bulging of the fistula (more than usual; a slight bulge is normal)     Follow-Up  Your healthcare provider will check your fistula within 1 to 2 weeks after the procedure. It will likely take about 6 to 8 weeks for the fistula to enlarge enough to start dialysis. After that, make sure the fistula is checked each time you have dialysis. Your  healthcare provider may also suggest checkups every 6 months.    Risks and possible complications include:  · The fistula not working properly  · Long wait before the fistula is ready (up to 6 months)  · Coldness or numbness in the hand (due to blood flowing away from the hand and into the fistula)  · An unsightly bump under the skin (due to enlargement of the fistula)  · Prolonged bleeding from the fistula after dialysis  · Narrowing or weakening of the blood vessels used for the fistula  · Formation of blood clots in the blood vessels used for the fistula  · Risks of anesthesia or any other medicines used during the procedure     Living with an AV Fistula  A problem, such as a narrowing (stricture) of the vein or an infection, can make the fistula unusable. If this happens, you may need other treatments to repair or make a new fistula. To protect your fistula, follow these and any other guidelines youre given:  · Check your fistula as often as your healthcare provider says. If you cant feel your thrill, let your provider know right away.  · Make sure your fistula is checked before each dialysis treatment.  · Dont let anyone draw blood from or take blood pressure on the arm that has the fistula.  · Wash your hands often and keep the area around your fistula clean.  · Dont sleep on the arm that has the fistula.  · Dont wear tight jewelry or a watch on the arm with your fistula.  · Protect your fistula from cuts, scrapes, or blows.      Discharge Instructions: After Your Surgery  Youve just had surgery. During surgery, you were given medicine called anesthesia to keep you relaxed and free of pain. After surgery, you may have some pain or nausea. This is common. Here are some tips for feeling better and getting well after surgery.     Stay on schedule with your medicine.     Going home  Your healthcare provider will show you how to take care of yourself when you go home. He or she will also answer your  questions. Have an adult family member or friend drive you home. For the first 24 hours after your surgery:    · Do not drive or use heavy equipment.  · Do not make important decisions or sign legal papers.  · Do not drink alcohol.  · Have someone stay with you, if needed. He or she can watch for problems and help keep you safe.    Be sure to go to all follow-up visits with your healthcare provider. And rest after your surgery for as long as your healthcare provider tells you to.    Coping with pain  If you have pain after surgery, pain medicine will help you feel better. Take it as told, before pain becomes severe. Also, ask your healthcare provider or pharmacist about other ways to control pain. This might be with heat, ice, or relaxation. And follow any other instructions your surgeon or nurse gives you.    Tips for taking pain medicine  To get the best relief possible, remember these points:    · Pain medicines can upset your stomach. Taking them with a little food may help.  · Most pain relievers taken by mouth need at least 20 to 30 minutes to start to work.  · Taking medicine on a schedule can help you remember to take it. Try to time your medicine so that you can take it before starting an activity. This might be before you get dressed, go for a walk, or sit down for dinner.  · Constipation is a common side effect of pain medicines. Call your healthcare provider before taking any medicines such as laxatives or stool softeners to help ease constipation. Also ask if you should skip any foods. Drinking lots of fluids and eating foods such as fruits and vegetables that are high in fiber can also help. Remember, do not take laxatives unless your surgeon has prescribed them.  · Drinking alcohol and taking pain medicine can cause dizziness and slow your breathing. It can even be deadly. Do not drink alcohol while taking pain medicine.  · Pain medicine can make you react more slowly to things. Do not drive or run  machinery while taking pain medicine.    Your healthcare provider may tell you to take acetaminophen to help ease your pain. Ask him or her how much you are supposed to take each day. Acetaminophen or other pain relievers may interact with your prescription medicines or other over-the-counter (OTC) medicines. Some prescription medicines have acetaminophen and other ingredients. Using both prescription and OTC acetaminophen for pain can cause you to overdose. Read the labels on your OTC medicines with care. This will help you to clearly know the list of ingredients, how much to take, and any warnings. It may also help you not take too much acetaminophen. If you have questions or do not understand the information, ask your pharmacist or healthcare provider to explain it to you before you take the OTC medicine.    Managing nausea  Some people have an upset stomach after surgery. This is often because of anesthesia, pain, or pain medicine, or the stress of surgery. These tips will help you handle nausea and eat healthy foods as you get better. If you were on a special food plan before surgery, ask your healthcare provider if you should follow it while you get better. These tips may help:  · Do not push yourself to eat. Your body will tell you when to eat and how much.  · Start off with clear liquids and soup. They are easier to digest.  · Next try semi-solid foods, such as mashed potatoes, applesauce, and gelatin, as you feel ready.  · Slowly move to solid foods. Dont eat fatty, rich, or spicy foods at first.  · Do not force yourself to have 3 large meals a day. Instead eat smaller amounts more often.  · Take pain medicines with a small amount of solid food, such as crackers or toast, to avoid nausea.     Call your surgeon if  · You still have pain an hour after taking medicine. The medicine may not be strong enough.  · You feel too sleepy, dizzy, or groggy. The medicine may be too strong.  · You have side effects  like nausea, vomiting, or skin changes, such as rash, itching, or hives.       If you have obstructive sleep apnea  You were given anesthesia medicine during surgery to keep you comfortable and free of pain. After surgery, you may have more apnea spells because of this medicine and other medicines you were given. The spells may last longer than usual.   At home:    · Keep using the continuous positive airway pressure (CPAP) device when you sleep. Unless your healthcare provider tells you not to, use it when you sleep, day or night. CPAP is a common device used to treat obstructive sleep apnea.  · Talk with your provider before taking any pain medicine, muscle relaxants, or sedatives. Your provider will tell you about the possible dangers of taking these medicines.    Date Last Reviewed: 12/1/2016  © 8131-0080 [employer]. [address] [place] [address], [place] [address]. All rights reserved. This information is not intended as a substitute for professional medical care. Always follow your healthcare professional's instructions.    PLEASE FOLLOW ANY OTHER INSTRUCTIONS PROVIDED TO YOU BY DR. KOEHLER!

## 2018-10-11 NOTE — OR NURSING
Surgery  cancelled per Dr Apple.   Pt instructed to followup with Dr Silveira office to reschedule.

## 2018-11-01 ENCOUNTER — ANESTHESIA EVENT (OUTPATIENT)
Dept: SURGERY | Facility: OTHER | Age: 53
DRG: 628 | End: 2018-11-01
Payer: MEDICAID

## 2018-11-01 ENCOUNTER — HOSPITAL ENCOUNTER (OUTPATIENT)
Facility: OTHER | Age: 53
Discharge: HOME | DRG: 628 | End: 2018-11-01
Attending: SPECIALIST | Admitting: SPECIALIST
Payer: MEDICAID

## 2018-11-01 ENCOUNTER — ANESTHESIA (OUTPATIENT)
Dept: SURGERY | Facility: OTHER | Age: 53
DRG: 628 | End: 2018-11-01
Payer: MEDICAID

## 2018-11-01 VITALS
SYSTOLIC BLOOD PRESSURE: 116 MMHG | RESPIRATION RATE: 16 BRPM | DIASTOLIC BLOOD PRESSURE: 55 MMHG | TEMPERATURE: 98 F | HEART RATE: 83 BPM | HEIGHT: 66 IN | OXYGEN SATURATION: 98 % | BODY MASS INDEX: 21.55 KG/M2 | WEIGHT: 134.06 LBS

## 2018-11-01 DIAGNOSIS — N18.6 ESRD (END STAGE RENAL DISEASE): ICD-10-CM

## 2018-11-01 DIAGNOSIS — D63.1 ANEMIA IN ESRD (END-STAGE RENAL DISEASE): ICD-10-CM

## 2018-11-01 DIAGNOSIS — E87.5 HYPERKALEMIA: ICD-10-CM

## 2018-11-01 DIAGNOSIS — N18.6 ESRD ON DIALYSIS: Primary | Chronic | ICD-10-CM

## 2018-11-01 DIAGNOSIS — N18.6 ANEMIA IN ESRD (END-STAGE RENAL DISEASE): ICD-10-CM

## 2018-11-01 DIAGNOSIS — I10 ESSENTIAL HYPERTENSION: Chronic | ICD-10-CM

## 2018-11-01 DIAGNOSIS — Z99.2 ESRD ON DIALYSIS: Primary | Chronic | ICD-10-CM

## 2018-11-01 DIAGNOSIS — G40.909 SEIZURE DISORDER: ICD-10-CM

## 2018-11-01 LAB
ALLENS TEST: ABNORMAL
DELSYS: ABNORMAL
HCT VFR BLD CALC: 34 %PCV (ref 36–54)
HGB BLD-MCNC: 12 G/DL
MODE: ABNORMAL
POTASSIUM BLD-SCNC: 4.4 MMOL/L (ref 3.5–5.1)
POTASSIUM SERPL-SCNC: 5.7 MMOL/L
POTASSIUM SERPL-SCNC: 6.1 MMOL/L
SAMPLE: ABNORMAL
SITE: ABNORMAL
SODIUM BLD-SCNC: 137 MMOL/L (ref 136–145)

## 2018-11-01 PROCEDURE — 63600175 PHARM REV CODE 636 W HCPCS: Performed by: NURSE ANESTHETIST, CERTIFIED REGISTERED

## 2018-11-01 PROCEDURE — 84132 ASSAY OF SERUM POTASSIUM: CPT

## 2018-11-01 PROCEDURE — 36000706: Performed by: SPECIALIST

## 2018-11-01 PROCEDURE — 25000003 PHARM REV CODE 250: Performed by: SPECIALIST

## 2018-11-01 PROCEDURE — 36415 COLL VENOUS BLD VENIPUNCTURE: CPT

## 2018-11-01 PROCEDURE — 25000003 PHARM REV CODE 250: Performed by: INTERNAL MEDICINE

## 2018-11-01 PROCEDURE — 37000008 HC ANESTHESIA 1ST 15 MINUTES: Performed by: SPECIALIST

## 2018-11-01 PROCEDURE — 36000707: Performed by: SPECIALIST

## 2018-11-01 PROCEDURE — C1729 CATH, DRAINAGE: HCPCS | Performed by: SPECIALIST

## 2018-11-01 PROCEDURE — 12000002 HC ACUTE/MED SURGE SEMI-PRIVATE ROOM

## 2018-11-01 PROCEDURE — 27201423 OPTIME MED/SURG SUP & DEVICES STERILE SUPPLY: Performed by: SPECIALIST

## 2018-11-01 PROCEDURE — 01844 ANES VASC SHUNT/SHUNT REVJ: CPT | Performed by: SPECIALIST

## 2018-11-01 PROCEDURE — 37000009 HC ANESTHESIA EA ADD 15 MINS: Performed by: SPECIALIST

## 2018-11-01 PROCEDURE — S0077 INJECTION, CLINDAMYCIN PHOSP: HCPCS | Performed by: SPECIALIST

## 2018-11-01 PROCEDURE — 63600175 PHARM REV CODE 636 W HCPCS: Performed by: SPECIALIST

## 2018-11-01 PROCEDURE — 80100016 HC MAINTENANCE HEMODIALYSIS

## 2018-11-01 PROCEDURE — 71000033 HC RECOVERY, INTIAL HOUR: Performed by: SPECIALIST

## 2018-11-01 PROCEDURE — 71000039 HC RECOVERY, EACH ADD'L HOUR: Performed by: SPECIALIST

## 2018-11-01 PROCEDURE — 25000003 PHARM REV CODE 250: Performed by: NURSE ANESTHETIST, CERTIFIED REGISTERED

## 2018-11-01 PROCEDURE — C1768 GRAFT, VASCULAR: HCPCS | Performed by: SPECIALIST

## 2018-11-01 DEVICE — GRAFT PROPATEN 6 X 8 THIN WALL: Type: IMPLANTABLE DEVICE | Site: ARM | Status: FUNCTIONAL

## 2018-11-01 RX ORDER — ONDANSETRON 2 MG/ML
INJECTION INTRAMUSCULAR; INTRAVENOUS
Status: DISCONTINUED | OUTPATIENT
Start: 2018-11-01 | End: 2018-11-01

## 2018-11-01 RX ORDER — MIDAZOLAM HYDROCHLORIDE 1 MG/ML
INJECTION INTRAMUSCULAR; INTRAVENOUS
Status: DISCONTINUED | OUTPATIENT
Start: 2018-11-01 | End: 2018-11-01

## 2018-11-01 RX ORDER — ACETAMINOPHEN 325 MG/1
650 TABLET ORAL EVERY 8 HOURS PRN
Status: DISCONTINUED | OUTPATIENT
Start: 2018-11-01 | End: 2018-11-01 | Stop reason: HOSPADM

## 2018-11-01 RX ORDER — SODIUM CHLORIDE 9 MG/ML
INJECTION, SOLUTION INTRAVENOUS
Status: DISCONTINUED | OUTPATIENT
Start: 2018-11-01 | End: 2018-11-01 | Stop reason: HOSPADM

## 2018-11-01 RX ORDER — ONDANSETRON 2 MG/ML
4 INJECTION INTRAMUSCULAR; INTRAVENOUS DAILY PRN
Status: DISCONTINUED | OUTPATIENT
Start: 2018-11-01 | End: 2018-11-01 | Stop reason: HOSPADM

## 2018-11-01 RX ORDER — HEPARIN SODIUM 1000 [USP'U]/ML
INJECTION, SOLUTION INTRAVENOUS; SUBCUTANEOUS
Status: DISCONTINUED | OUTPATIENT
Start: 2018-11-01 | End: 2018-11-01 | Stop reason: HOSPADM

## 2018-11-01 RX ORDER — RAMELTEON 8 MG/1
8 TABLET ORAL NIGHTLY PRN
Status: DISCONTINUED | OUTPATIENT
Start: 2018-11-01 | End: 2018-11-01 | Stop reason: HOSPADM

## 2018-11-01 RX ORDER — DIPHENHYDRAMINE HYDROCHLORIDE 50 MG/ML
25 INJECTION INTRAMUSCULAR; INTRAVENOUS EVERY 4 HOURS PRN
Status: DISCONTINUED | OUTPATIENT
Start: 2018-11-01 | End: 2018-11-01 | Stop reason: HOSPADM

## 2018-11-01 RX ORDER — MEPERIDINE HYDROCHLORIDE 50 MG/ML
12.5 INJECTION INTRAMUSCULAR; INTRAVENOUS; SUBCUTANEOUS ONCE AS NEEDED
Status: DISCONTINUED | OUTPATIENT
Start: 2018-11-01 | End: 2018-11-01 | Stop reason: HOSPADM

## 2018-11-01 RX ORDER — ACETAMINOPHEN 10 MG/ML
INJECTION, SOLUTION INTRAVENOUS
Status: DISCONTINUED | OUTPATIENT
Start: 2018-11-01 | End: 2018-11-01

## 2018-11-01 RX ORDER — OXYCODONE HYDROCHLORIDE 5 MG/1
10 TABLET ORAL EVERY 4 HOURS PRN
Status: DISCONTINUED | OUTPATIENT
Start: 2018-11-01 | End: 2018-11-01 | Stop reason: HOSPADM

## 2018-11-01 RX ORDER — OXYCODONE AND ACETAMINOPHEN 7.5; 325 MG/1; MG/1
1 TABLET ORAL EVERY 6 HOURS PRN
Qty: 28 TABLET | Refills: 0 | Status: ON HOLD | OUTPATIENT
Start: 2018-11-01 | End: 2018-11-26 | Stop reason: HOSPADM

## 2018-11-01 RX ORDER — FENTANYL CITRATE 50 UG/ML
INJECTION, SOLUTION INTRAMUSCULAR; INTRAVENOUS
Status: DISCONTINUED | OUTPATIENT
Start: 2018-11-01 | End: 2018-11-01

## 2018-11-01 RX ORDER — ONDANSETRON 8 MG/1
8 TABLET, ORALLY DISINTEGRATING ORAL EVERY 8 HOURS PRN
Status: DISCONTINUED | OUTPATIENT
Start: 2018-11-01 | End: 2018-11-01 | Stop reason: HOSPADM

## 2018-11-01 RX ORDER — ACETAMINOPHEN 325 MG/1
650 TABLET ORAL EVERY 4 HOURS PRN
Status: DISCONTINUED | OUTPATIENT
Start: 2018-11-01 | End: 2018-11-01 | Stop reason: HOSPADM

## 2018-11-01 RX ORDER — SODIUM CHLORIDE 9 MG/ML
INJECTION, SOLUTION INTRAVENOUS CONTINUOUS
Status: DISCONTINUED | OUTPATIENT
Start: 2018-11-01 | End: 2018-11-01 | Stop reason: HOSPADM

## 2018-11-01 RX ORDER — SODIUM CHLORIDE 0.9 % (FLUSH) 0.9 %
3 SYRINGE (ML) INJECTION EVERY 8 HOURS
Status: DISCONTINUED | OUTPATIENT
Start: 2018-11-01 | End: 2018-11-01 | Stop reason: HOSPADM

## 2018-11-01 RX ORDER — EPHEDRINE SULFATE 50 MG/ML
INJECTION, SOLUTION INTRAVENOUS
Status: DISCONTINUED | OUTPATIENT
Start: 2018-11-01 | End: 2018-11-01

## 2018-11-01 RX ORDER — SODIUM CHLORIDE 9 MG/ML
INJECTION, SOLUTION INTRAVENOUS ONCE
Status: COMPLETED | OUTPATIENT
Start: 2018-11-01 | End: 2018-11-01

## 2018-11-01 RX ORDER — CLINDAMYCIN PHOSPHATE 900 MG/50ML
900 INJECTION, SOLUTION INTRAVENOUS
Status: DISCONTINUED | OUTPATIENT
Start: 2018-11-01 | End: 2018-11-01 | Stop reason: HOSPADM

## 2018-11-01 RX ORDER — HYDROMORPHONE HYDROCHLORIDE 2 MG/ML
0.4 INJECTION, SOLUTION INTRAMUSCULAR; INTRAVENOUS; SUBCUTANEOUS EVERY 5 MIN PRN
Status: DISCONTINUED | OUTPATIENT
Start: 2018-11-01 | End: 2018-11-01 | Stop reason: HOSPADM

## 2018-11-01 RX ORDER — LIDOCAINE HYDROCHLORIDE 10 MG/ML
1 INJECTION, SOLUTION EPIDURAL; INFILTRATION; INTRACAUDAL; PERINEURAL ONCE
Status: DISCONTINUED | OUTPATIENT
Start: 2018-11-01 | End: 2018-11-01 | Stop reason: HOSPADM

## 2018-11-01 RX ORDER — SODIUM CHLORIDE 0.9 % (FLUSH) 0.9 %
3 SYRINGE (ML) INJECTION
Status: DISCONTINUED | OUTPATIENT
Start: 2018-11-01 | End: 2018-11-01 | Stop reason: HOSPADM

## 2018-11-01 RX ORDER — FENTANYL CITRATE 50 UG/ML
25 INJECTION, SOLUTION INTRAMUSCULAR; INTRAVENOUS EVERY 5 MIN PRN
Status: DISCONTINUED | OUTPATIENT
Start: 2018-11-01 | End: 2018-11-01 | Stop reason: HOSPADM

## 2018-11-01 RX ORDER — OXYCODONE HYDROCHLORIDE 5 MG/1
5 TABLET ORAL EVERY 4 HOURS PRN
Status: DISCONTINUED | OUTPATIENT
Start: 2018-11-01 | End: 2018-11-01 | Stop reason: HOSPADM

## 2018-11-01 RX ORDER — PROPOFOL 10 MG/ML
VIAL (ML) INTRAVENOUS CONTINUOUS PRN
Status: DISCONTINUED | OUTPATIENT
Start: 2018-11-01 | End: 2018-11-01

## 2018-11-01 RX ORDER — OXYCODONE HYDROCHLORIDE 5 MG/1
5 TABLET ORAL
Status: DISCONTINUED | OUTPATIENT
Start: 2018-11-01 | End: 2018-11-01 | Stop reason: HOSPADM

## 2018-11-01 RX ORDER — PROPOFOL 10 MG/ML
VIAL (ML) INTRAVENOUS
Status: DISCONTINUED | OUTPATIENT
Start: 2018-11-01 | End: 2018-11-01

## 2018-11-01 RX ADMIN — CARBOXYMETHYLCELLULOSE SODIUM 2 DROP: 2.5 SOLUTION/ DROPS OPHTHALMIC at 04:11

## 2018-11-01 RX ADMIN — FENTANYL CITRATE 50 MCG: 50 INJECTION, SOLUTION INTRAMUSCULAR; INTRAVENOUS at 06:11

## 2018-11-01 RX ADMIN — EPHEDRINE SULFATE 5 MG: 50 INJECTION INTRAMUSCULAR; INTRAVENOUS; SUBCUTANEOUS at 04:11

## 2018-11-01 RX ADMIN — SODIUM CHLORIDE: 0.9 INJECTION, SOLUTION INTRAVENOUS at 04:11

## 2018-11-01 RX ADMIN — EPHEDRINE SULFATE 10 MG: 50 INJECTION INTRAMUSCULAR; INTRAVENOUS; SUBCUTANEOUS at 05:11

## 2018-11-01 RX ADMIN — ONDANSETRON 4 MG: 2 INJECTION INTRAMUSCULAR; INTRAVENOUS at 05:11

## 2018-11-01 RX ADMIN — PHENYLEPHRINE HYDROCHLORIDE 40 MCG: 10 INJECTION INTRAVENOUS at 05:11

## 2018-11-01 RX ADMIN — PHENYLEPHRINE HYDROCHLORIDE 40 MCG: 10 INJECTION INTRAVENOUS at 04:11

## 2018-11-01 RX ADMIN — OXYCODONE HYDROCHLORIDE 5 MG: 5 TABLET ORAL at 08:11

## 2018-11-01 RX ADMIN — MIDAZOLAM HYDROCHLORIDE 2 MG: 1 INJECTION, SOLUTION INTRAMUSCULAR; INTRAVENOUS at 04:11

## 2018-11-01 RX ADMIN — FENTANYL CITRATE 100 MCG: 50 INJECTION, SOLUTION INTRAMUSCULAR; INTRAVENOUS at 04:11

## 2018-11-01 RX ADMIN — FENTANYL CITRATE 50 MCG: 50 INJECTION, SOLUTION INTRAMUSCULAR; INTRAVENOUS at 05:11

## 2018-11-01 RX ADMIN — HYDROMORPHONE HYDROCHLORIDE 0.4 MG: 2 INJECTION, SOLUTION INTRAMUSCULAR; INTRAVENOUS; SUBCUTANEOUS at 06:11

## 2018-11-01 RX ADMIN — EPHEDRINE SULFATE 25 MG: 50 INJECTION INTRAMUSCULAR; INTRAVENOUS; SUBCUTANEOUS at 05:11

## 2018-11-01 RX ADMIN — CLINDAMYCIN PHOSPHATE 900 MG: 18 INJECTION, SOLUTION INTRAVENOUS at 04:11

## 2018-11-01 RX ADMIN — PROPOFOL 50 MCG/KG/MIN: 10 INJECTION, EMULSION INTRAVENOUS at 05:11

## 2018-11-01 RX ADMIN — PROPOFOL 200 MG: 10 INJECTION, EMULSION INTRAVENOUS at 04:11

## 2018-11-01 RX ADMIN — ACETAMINOPHEN 1000 MG: 10 INJECTION, SOLUTION INTRAVENOUS at 05:11

## 2018-11-01 NOTE — CONSULTS
[facility] Consult Note    Consult Requested by:  Dr. Apple  Reason for Consult:  ESRD and hyperkalemia evaluation and management    SUBJECTIVE:     History of Present Illness:  Patient is a 52 y.o. male with a history of ESRD (MWF at [facility] under my care), HTN, seizure d/o, bipolar who presents for access surgery with Dr. Apple, was noted to have K above 6.0.  He does have persistent hyperkalemia.  He currently is on dialysis and uses a L IJ TDC, which has been giving the dialysis nurses difficulty in outpt dialysis lately.  However, he had no trouble with it today while on dialysis.    He otherwise denies recent CP, SOB, palp, abd pain, n/v/d, LE swelling.  He saw neurology yesterday at [facility], who increased his keppra doses.    Currently on dialysis with 2K/2.5Ca bath, goal UF 0.5L    Review of patient's allergies indicates:   Allergen Reactions    Lasix [furosemide]     Morphine     Zoloft [sertraline] Rash       Past Medical History:   Diagnosis Date    Anemia     Aspiration pneumonia     Bacterial endocarditis     Bloating     Cardiomyopathy     Constipation     Diabetes mellitus, type 2     DM (diabetes mellitus)     DVT (deep venous thrombosis)     Encounter for blood transfusion     Flat affect     Gastroparesis     Glaucoma     Hx: UTI (urinary tract infection) frequent    Hypertension     Hypoalbuminemia     Hypotension (arterial) 2/6/07    Hypothyroidism     Metabolic encephalopathy     Metabolic encephalopathy     Pancreas transplanted 6/10/2000    PONV (postoperative nausea and vomiting)     PVD (peripheral vascular disease)     Renal disorder     Retinopathy     Status post kidney transplant 6/10/2000    Stroke     TIA    Thyroid disease      Past Surgical History:   Procedure Laterality Date    COMBINED KIDNEY-PANCREAS TRANSPLANT      DIALYSIS FISTULA CREATION      right groin access    EYE SURGERY      HERNIA REPAIR  4/09    HYSTERECTOMY      LEFT  OOPHORECTOMY      REVISION OF SCAR      right ovary biopsy       Family History   Problem Relation Age of Onset    Diabetes Mother     Hypertension Mother     Hypertension Father     Hypertension Sister     Hypertension Brother     Aortic aneurysm Brother      Social History     Tobacco Use    Smoking status: Never Smoker    Smokeless tobacco: Never Used   Substance Use Topics    Alcohol use: No     Comment: occasional    Drug use: No       Review of Systems:  10 point ROS done and is neg besides what is listed above in HPI    OBJECTIVE:     Vital Signs:  Temp:  [94.8 °F (34.9 °C)]   Pulse:  [67-76]   Resp:  [2-29]   BP: (149-188)/()   SpO2:  [97 %-100 %]     BP Readings from Last 3 Encounters:   11/01/18 (!) 112/91   10/11/18 129/79   10/01/18 127/76       No intake/output data recorded.    Physical Exam:  GEN:  appears well, comfortably sitting up in bed, in no acute distress, on dialysis  HEENT:  anicteric sclera, MMM  NECK:  no apparent JVD  CVS:  RRR with no murmurs or rubs  PULM:  CTAB  ABD:  soft, nontender, nondistended, normal bowel sounds  EXTR:  no edema  SKIN:  no rashes or lesions, warm and not diaphoretic  ACCESS:  L IJ TDC, being accessed    Laboratory:  No results found for this or any previous visit (from the past 336 hour(s)).  Lab Results   Component Value Date    LABPROT 10.7 01/07/2018    ALBUMIN 2.6 (L) 01/10/2018     No results for input(s): BNP, BNPTRIAGEBLO in the last 168 hours.    No results for input(s): CPK, CPKMB, TROPONINI, MB in the last 168 hours.    Lab Results   Component Value Date    WBC 4.39 10/11/2018    RBC 3.35 (L) 10/11/2018    HGB 10.4 (L) 10/11/2018    HCT 32.6 (L) 10/11/2018    MCV 97 10/11/2018    MCH 31.0 10/11/2018    MCHC 31.9 (L) 10/11/2018    RDW 13.9 10/11/2018     10/11/2018    MPV 9.4 10/11/2018    GRAN 2.3 10/11/2018    GRAN 51.3 10/11/2018    LYMPH 1.5 10/11/2018    LYMPH 33.9 10/11/2018    MONO 0.4 10/11/2018    MONO 9.1 10/11/2018     EOS 0.2 10/11/2018    BASO 0.01 10/11/2018    EOSINOPHIL 5.5 10/11/2018    BASOPHIL 0.2 10/11/2018       No results found for: LABA1C, HGBA1C      Diagnostic Results:  Labs: Reviewed  X-Ray: Reviewed    ASSESSMENT/PLAN:     52 y.o. male with a history of ESRD (MWF at [facility] under my care), HTN, seizure d/o, bipolar who presents for access surgery with Dr. Apple, was noted to have K above 6.0.     1. ESRD - as above, currently on HD for modulation of hyperkalemia  - he is going to the OR afterwards  - he can be discharged post-op from our perspective, with plan to be at his dialysis unit tomorrow (normal day)  - strict I/O, daily weights  - please avoid gadolinium, fleets, phos-based laxatives, NSAIDs    2. HTN  - well controlled    3. Hyperkalemia  - as above    4. Seizure d/o  - as above, per his primary neurologist    5. Anemia of ESRD  - can hold EPO today, will receive tomorrow at his dialysis unit        James Torres MD
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
## 2018-11-01 NOTE — TRANSFER OF CARE
"Anesthesia Transfer of Care Note    Patient: Greyson Tristan III    Procedure(s) Performed: Procedure(s) (LRB):  CREATION, AV FISTULA - RIGHT AVF (Right)    Patient location: PACU    Anesthesia Type: general    Transport from OR: Transported from OR on room air with adequate spontaneous ventilation    Post pain: adequate analgesia    Post assessment: no apparent anesthetic complications    Level of consciousness: responds to stimulation    Nausea/Vomiting: no nausea/vomiting    Complications: none    Transfer of care protocol was followed      Last vitals:   Visit Vitals  BP (!) 112/91   Pulse 105   Temp 36.6 °C (97.8 °F)   Resp 18   Ht 5' 6" (1.676 m)   Wt 60.8 kg (134 lb 0.6 oz)   SpO2 100%   BMI 21.63 kg/m²     "

## 2018-11-01 NOTE — PLAN OF CARE
"Pt prefers to have Rita , called once ready for discharge.  [facility]  "

## 2018-11-01 NOTE — OR NURSING
Spoke with OR adrianna Knapp) about patient's Potassium 6.1 and to have MD speak with patient before beginning next surgical case. Also spoke with Dr. Alegre and TERESSA Mclain about this.

## 2018-11-01 NOTE — OP NOTE
[facility]  Brief Operative Note    SUMMARY     Surgery Date: 11/1/2018     Surgeon(s) and Role:     * Jeremiah Apple Jr., MD - Primary    Assisting Surgeon: None    Pre-op Diagnosis:  End stage renal disease [N18.6]  Mechanical complication of arteriovenous fistula surgically created, initial encounter [T82.590A]    Post-op Diagnosis:  Post-Op Diagnosis Codes:     * End stage renal disease [N18.6]     * Mechanical complication of arteriovenous fistula surgically created, initial encounter [T82.590A]    Procedure(s) (LRB):  CREATION, AV FISTULA - RIGHT AVF (Right)    Anesthesia: General    Description of Procedure: PTFE avg RUE    Description of the findings of the procedure: basilic vein small    Estimated Blood Loss: 100cc         Specimens:   Specimen (12h ago, onward)    None

## 2018-11-01 NOTE — ANESTHESIA POSTPROCEDURE EVALUATION
"Anesthesia Post Evaluation    Patient: Greyson Tristan III    Procedure(s) Performed: Procedure(s) (LRB):  CREATION, AV FISTULA - RIGHT AVF (Right)    Final Anesthesia Type: general  Patient location during evaluation: PACU  Patient participation: Yes- Able to Participate  Level of consciousness: awake and alert  Post-procedure vital signs: reviewed and stable  Pain management: adequate  Airway patency: patent  PONV status at discharge: No PONV  Anesthetic complications: no      Cardiovascular status: blood pressure returned to baseline  Respiratory status: unassisted and spontaneous ventilation  Hydration status: euvolemic  Follow-up not needed.        Visit Vitals  /65 (BP Location: Right arm, Patient Position: Lying)   Pulse 80   Temp 36.3 °C (97.4 °F) (Oral)   Resp 18   Ht 5' 6" (1.676 m)   Wt 60.8 kg (134 lb 0.6 oz)   SpO2 100%   BMI 21.63 kg/m²       Pain/Steph Score: Pain Assessment Performed: Yes (11/1/2018 10:13 AM)  Presence of Pain: denies (11/1/2018 10:13 AM)        "

## 2018-11-01 NOTE — OR NURSING
Dr. Apple wants to have patient receive dialysis today; will admit and then have surgery later today. Spoke with Rebekah in dialysis and gave report.

## 2018-11-01 NOTE — ANESTHESIA PREPROCEDURE EVALUATION
11/01/2018  Greyson Hudson III is a 52 y.o., male.    Anesthesia Evaluation    I have reviewed the Patient Summary Reports.    I have reviewed the Nursing Notes.   I have reviewed the Medications.     Review of Systems  Anesthesia Hx:  No problems with previous Anesthesia  History of prior surgery of interest to airway management or planning: Previous anesthesia: General Recent revision and line placement for dialysis with general anesthesia.  Procedure performed at an [facility]. Denies Family Hx of Anesthesia complications.   Denies Personal Hx of Anesthesia complications.   Social:  Former Smoker    Hematology/Oncology:     Oncology Normal    -- Anemia: Hematology Comments: HIV positive    EENT/Dental:EENT/Dental Normal   Cardiovascular:   Exercise tolerance: poor Hypertension, poorly controlled PVD hyperlipidemia    Pulmonary:  Pulmonary Normal    Renal/:   Chronic Renal Disease, ESRD, Dialysis MWF dialysis   Musculoskeletal:   Arthritis     Neurological:   Seizures, poorly controlled    Endocrine:  Endocrine Normal    Dermatological:  Skin Normal    Psych:  Psychiatric Normal           Physical Exam  General:  Well nourished    Airway/Jaw/Neck:  Airway Findings: Mouth Opening: Normal Tongue: Normal  General Airway Assessment: Adult  Mallampati: II     Eyes/Ears/Nose:  Eyes/Ears/Nose Findings:    Dental:  Dental Findings: Periodontal disease, Severe        Mental Status:  Mental Status Findings:  Flat Affect         Anesthesia Plan  Type of Anesthesia, risks & benefits discussed:  Anesthesia Type:  general  Patient's Preference:   Intra-op Monitoring Plan: standard ASA monitors  Intra-op Monitoring Plan Comments:   Post Op Pain Control Plan: multimodal analgesia and per primary service following discharge from PACU  Post Op Pain Control Plan Comments:   Induction:   IV  Beta Blocker:         Informed  Consent: Patient understands risks and agrees with Anesthesia plan.  Questions answered. Anesthesia consent signed with patient.  ASA Score: 3     Day of Surgery Review of History & Physical:    H&P update referred to the surgeon.     Anesthesia Plan Notes: Labs am of surg,MWF dialysis w [facility],discussed [facility] and [facility],HIV positive        Ready For Surgery From Anesthesia Perspective.

## 2018-11-02 ENCOUNTER — PATIENT OUTREACH (OUTPATIENT)
Dept: ADMINISTRATIVE | Facility: CLINIC | Age: 53
End: 2018-11-02

## 2018-11-02 NOTE — PATIENT INSTRUCTIONS
Renal Insufficiency  The role of the kidneys is to remove waste products and excess water from the body. When the kidneys do not function normally, waste products build up in the blood. The early stage of this process is called renal insufficiency. If renal insufficiency worsens it can lead to chronic renal failure. This allows excess water, waste and toxic substances to build up in the body. This can become a threat to life, requiring dialysis or a kidney transplant to stay alive.  Diabetes is the leading causes of renal insufficiency. Other causes include high blood pressure, hardening of the arteries, lupus, inflammation of the blood vessels (vasculitis), prior viral and bacterial infections, and others. Certain over-the-counter pain medicines can cause renal failure when taken often over a long period of time. These include aspirin, ibuprofen (Advil, Motrin) and related anti-inflammatory medicines.  Home Care:  If you have diabetes, talk to your doctor about the quality of your blood sugar control. Ask about any changes needed to your diet or medicines.  If you have high blood pressure:  Take your blood pressure medicine.  Take up a regular exercise program that you enjoy. Check with your doctor to be sure your planned exercise program is right for you.  Reduce your salt (sodium) intake. Your doctor can tell you how much salt per day is safe for you.  If you are overweight, talk to your doctor about a weight loss plan.  If you smoke, you must quit. Smoking worsens kidney disease. Talk to your doctor about ways to help you quit. For more information, visit the following links:  www.smokefree.gov/pubs/clearing_the_air.pdf  www.smokefree.gov  www.quitnet.com  Talk to your doctor about any dietary restrictions advised. In general, it is advisable to limit protein, salt, potassium and phosphorus. Avoid excess fluids. Do not add salt at the table and avoid salty foods. A calcium supplement may be prescribed to  protect your bones from osteoporosis.  Avoid the following over the counter medicines, or consult your doctor before using:  Aspirin and anti-inflammatory drugs such as ibuprofen (Advil, Motrin), naprosyn (Aleve); [Short term use of acetaminophen (Tylenol) for fever or pain is okay.]  Laxatives and antacids containing magnesium or aluminum (Mylanta, Maalox)  Avoid Fleet or phosphosoda enemas which contain phosphorus  Certain stomach acid-blocking medicine such as cimetidine (Tagamet), ranitidine (Zantac)  Decongestants containing pseudoephedrine (such as some forms of Sudafed or Actifed)  Herbal supplements  Follow Up  with your doctor or as advised by our staff. Contact one of the following for more information.  American Association of Kidney Patients [phone] www.aakp.org  National Kidney Foundation (232) 238-1753 www.kidney.org  Return Promptly  or contact your doctor if any of the following occurs:  Nausea or vomiting  Severe weakness, dizziness, fainting, drowsiness or confusion  Chest pain or shortness of breath  Unexpected weight gain or swelling in the legs, ankles or around the eyes  Heart beating fast, slow or irregularly  Decrease or loss in urine output  © 4602-5905 [facility], [address] [place] [address], [place] [address]. All rights reserved. This information is not intended as a substitute for professional medical care. Always follow your healthcare professional's instructions.

## 2018-11-02 NOTE — PROGRESS NOTES
C3 nurse attempted to contact patient. The following occurred:   C3 nurse attempted to contact Greyson Hudson III  for a TCC post hospital discharge follow up call. The patient is unable to conduct the call @ this time. The patient requested a callback.    The patient does not have a scheduled HOSFU appointment within 7-14 days post hospital discharge date 11/1/18. Patient has no PCP assigned at this time, unable to route.

## 2018-11-02 NOTE — OP NOTE
DATE OF PROCEDURE:  11/01/2018.    PREOPERATIVE DIAGNOSIS:  End-stage renal disease.    POSTOPERATIVE DIAGNOSIS:  End-stage renal disease.    PROCEDURE:  Construction AV fistula, right upper extremity via Greenfield-Anthony graft,   brachial artery to brachial vein and exploration of her right arm.    PROCEDURE IN DETAILS:  The patient was brought to the Operating Room, placed in   supine position.  Right upper extremity prepped and draped in a sterile fashion.    Longitudinal incision made along the medial aspect of the arm just above the   olecranon, carried down through subcutaneous tissues.  Basilic vein was   identified and isolated.  It was small and atretic and not suitable for use to   construct an autogenous fistula.  A second longitudinal incision was made up in   the axilla, carried down through the subcutaneous tissues.  The brachial artery   and vein identified and isolated.  A 6 mm Greenfield-Anthony graft repaired brought   through a subcutaneous tissue tunnel through separate stab wounds to form a loop   in the upper arm.  Longitudinal venotomy made.  The graft sewn end-to-side to   the vein with running #6 Greenfield-Anthony suture.  After hemostasis had been obtained, a   longitudinal arteriotomy was made in the brachial artery.  Graft cut to an   appropriate size and configuration, sutured end-to-side to the artery with   running #6 Greenfield-Anthony suture.  After hemostasis was obtained, a 15 Inocencio drain was   placed in the axillary wound and brought through a separate stab wound, secured   with 2-0 silk.  All wounds were then closed in layers with interrupted 3-0   Vicryl and a stapling device.  The patient tolerated the procedure well and left   the Operating Room in good condition.      ELADIO  dd: 11/01/2018 18:20:58 (CDT)  td: 11/01/2018 21:38:21 (VALERIE)  Doc ID   #[id number]  Job ID #[id number]    CC:

## 2018-11-02 NOTE — PLAN OF CARE
Greyson has met all discharge criteria from phase II;  VSS; ambulating without diffuculty; pain is a 5 and is tolerable for patient; patient discharged from facility via wheelchair with father Jayme

## 2018-11-02 NOTE — DISCHARGE SUMMARY
DATE OF ADMISSION:  11/01/2018    DATE OF DISCHARGE:  11/01/2018    HOSPITAL COURSE AND BRIEF CLINICAL HISTORY:  The patient is a 52-year-old male   who presented for construction of an AV graft of the right arm as an outpatient.    Preoperative potassium drawn this morning showed a potassium greater than 6.    The patient was admitted, underwent dialysis and subsequently underwent   construction of an AV graft.  The patient had benign postoperative course, being   discharged to be followed by me in my office.  He has been instructed as to   appropriate limitations of his activity and diet as well as wound care.  New   medication at the time of discharge include Percocet 7.5/325 one q.  4 hours   p.r.n. pain.      JJW/JIM  dd: 11/01/2018 18:22:17 (CDT)  td: 11/02/2018 05:39:58 (CDT)  Doc ID   #[id number]  Job ID #[id number]    CC:

## 2018-11-02 NOTE — DISCHARGE INSTRUCTIONS
Discharge Instructions: Caring for Your Amos-Sykes Drainage Tube  Your doctor discharges you with a Amos-Sykes drainage tube. Doctors commonly leave this drain within the abdominal cavity after surgery. It helps drain and collect blood and body fluid after surgery. This can prevent swelling and reduces the risk for infection. The tube is held in place by a few stitches. It is covered with a bandage. Your doctor will remove the drain when he or she determines you no longer need it.  Home care  · Dont sleep on the same side as the tube.  · Secure the tube and bag inside your clothing with a safety pin. This helps keep the tube from being pulled out.  · Empty your drain at least twice a day. Empty it more often if the drain is full. Wash  and dry your hands before emptying the drain.  ¨ Lift the opening on the drain.  ¨ Drain the fluid into a measuring cup.  ¨ Record the amount of fluid each time you empty the drain. Include the date and time it was emptied. Share this information with your doctor on your next visit.  ¨ Squeeze the bulb with your hands until you hear air coming out of the bulb if your doctor has instructed you to do so (sometimes the bulb is used as a reservoir without suction). Check with your doctor about specific drain instructions.  ¨ Close the opening.  · Change the dressing around the tube every day.  ¨ Wash your hands.  ¨ Remove the old bandage.  ¨ Wash your hands again.  ¨ Wet a cotton swab and clean the skin around the incision and tube site. Use normal saline solution (salt and water). Or, you can use warm, soapy water.  ¨ Put a new bandage on the incision and tube site. Make the bandage large enough to cover the whole incision area.  ¨ Tape the bandage in place.  · Keep the bandage and tube site dry when you shower. Ask your healthcare provider about the best way to do this.  · Stripping the tube helps keep blood clots from blocking the tube. Ask your nurse how often you should  strip the tube. Stripping may not be needed, depending on where and why your doctor placed the tube. It may even be dangerous in some cases.   ¨ Hold the tubing where it leaves the skin, with one hand. This keeps it from pulling on the skin.  ¨ Pinch the tubing with the thumb and first finger of your other hand.  ¨ Slowly and firmly pull your thumb and first finger down the tubing. You may find it helpful to hold an alcohol swab between your fingers and the tube to lubricate the tubing.  ¨ If the pulling hurts or feels like its coming out of the skin, stop. Begin again more gently.  Follow-up care  Make a follow-up appointment as directed by our staff.     When to seek medical care  Call your healthcare provider right away if you have any of the following:  · New or increased pain around the tube  · Redness, swelling, or warmth around the incision or tube  · Drainage that is foul-smelling  · Vomiting  · Fever of 100.4°F (38°C)  · Fluid leaking around the tube  · Incision seems not to be healing  · Stitches become loose  · Tube falls out or breaks  · Drainage that changes from light pink to dark red  · Blood clots in the drainage bulb  · A sudden increase or decrease in the amount of drainage (over 30 mL)   Date Last Reviewed: 2/1/2017 © 2000-2017 [employer], "[employer] [address] [place] [address], [place] [address]. All rights reserved. This information is not intended as a substitute for professional medical care. Always follow your healthcare professional's instructions.            Arteriovenous (AV) Fistula for Dialysis  An AV fistula is a connection between an artery and a vein. For this procedure, an AV fistula is surgically created using an artery and a vein in your arm. (Your doctor will let you know if another site is to be used.) When the artery and vein are joined, blood flow increases from the artery into the vein. As a result, the vein enlarges over time. The enlarged vein provides easier access to the  blood for dialysis (a treatment for kidney failure). This sheet explains the procedure and what to expect.                      After the procedure:  · Youll be asked to keep your arm raised (elevated) as often as possible for at least a week after the procedure.  · Youll be given medications to manage pain as needed.  · Your arm and hand will be checked to make sure blood is flowing through the fistula properly. The feeling of blood rushing through the fistula is called a thrill. It is somewhat similar to the purring of a cat. Youll be taught how to check for this feeling each day to make sure there are no problems with your fistula. Youll also be taught how to care for your fistula at home.  · When its time for you to leave the hospital, have an adult family member or friend ready to drive you home.  Recovering at Home  Once at home, follow all of the instructions youve been given. Be sure to:  · Take all medications as directed.  · Care for your incision as instructed.  · Check for signs of infection at the incision site (see below).  · Avoid heavy lifting and strenuous activities as directed.  · Monitor and care for your fistula as instructed       Living with an AV Fistula  A problem, such as narrowing of the vein (stricture) or infection, can make the fistula unusable. If this happens, you may need other treatments to repair or make a new fistula. To protect your fistula, follow these and any other guidelines youre given:  · Check your fistula as often as your health care provider says. If you cant feel your thrill, let he or she know right away.  · Make sure your fistula is checked before each dialysis treatment.  · Dont let anyone draw blood from or take blood pressure on the arm that has the fistula.  · Wash your hands often and keep the area around your fistula clean.  · Dont sleep on the arm that has the fistula.  · Dont wear tight jewelry or a watch on the arm with your fistula.  · Protect  your fistula from cuts, scrapes, or blows.  © 8822-6841 [employer]. [address] [facility] [address]  Anesthesia: After Your Surgery  Youve just had surgery. During surgery, you received medication called anesthesia to keep you comfortable and pain-free. After surgery, you may experience some pain or nausea. This is common. Here are some tips for feeling better and recovering after surgery.    Going home  Your doctor or nurse will show you how to take care of yourself when you go home. He or she will also answer your questions. Have an adult family member or friend drive you home. For the first 24 hours after your surgery:  · Do not drive or use heavy equipment.  · Do not make important decisions or sign legal documents.  · Avoid alcohol.  · Have someone stay with you, if needed. He or she can watch for problems and help keep you safe.  Be sure to keep all follow-up appointments with your doctor. And rest after your procedure for as long as your doctor tells you to.    Coping with pain  If you have pain after surgery, pain medication will help you feel better. Take it as directed, before pain becomes severe. Also, ask your doctor or pharmacist about other ways to control pain, such as with heat, ice, and relaxation. And follow any other instructions your surgeon or nurse gives you.    Tips for taking pain medication  To get the best relief possible, remember these points:  · Pain medications can upset your stomach. Taking them with a little food may help.  · Most pain relievers taken by mouth need at least 20 to 30 minutes to take effect.  · Taking medication on a schedule can help you remember to take it. Try to time your medication so that you can take it before beginning an activity, such as dressing, walking, or sitting down for dinner.  · Constipation is a common side effect of pain medications. Contact your doctor before taking any medications like laxatives or stool softeners to help relieve constipation.  Also ask about any dietary restrictions, because drinking lots of fluids and eating foods like fruits and vegetables that are high in fiber can also help. Remember, dont take laxatives unless your surgeon has prescribed them.  · Mixing alcohol and pain medication can cause dizziness and slow your breathing. It can even be fatal. Dont drink alcohol while taking pain medication.  · Pain medication can slow your reflexes. Dont drive or operate machinery while taking pain medication.  If your health care provider tells you to take acetaminophen to help relieve your pain, ask him or her how much you are supposed to take each day. (Acetaminophen is the generic name for Tylenol and other brand-name pain relievers.) Acetaminophen or other pain relievers may interact with your prescription medicines or other over-the-counter (OTC) drugs. Some prescription medications contain acetaminophen along with other active ingredients. Using both prescription and OTC acetaminophen for pain can cause you to overdose. The FDA recommends that you read the labels on your OTC medications carefully. This will help you to clearly understand the list of active ingredients, dosing instructions, and any warnings. It may also help you avoid taking too much acetaminophen. If you have questions or don't understand the information, ask your pharmacist or health care provider to explain it to you before you take the OTC medication.    Managing nausea  Some people have an upset stomach after surgery. This is often due to anesthesia, pain, pain medications, or the stress of surgery. The following tips will help you manage nausea and get good nutrition as you recover. If you were on a special diet before surgery, ask your doctor if you should follow it during recovery. These tips may help:  · Dont push yourself to eat. Your body will tell you when to eat and how much.  · Start off with clear liquids and soup. They are easier to digest.  · Progress  to semi-solid foods (mashed potatoes, applesauce, and gelatin) as you feel ready.  · Slowly move to solid foods. Dont eat fatty, rich, or spicy foods at first.  · Dont force yourself to have three large meals a day. Instead, eat smaller amounts more often.  · Take pain medications with a small amount of solid food, such as crackers or toast to avoid nausea.      Call your surgeon if    · You feel too sleepy, dizzy, or groggy (medication may be too strong).  · You have side effects like nausea, vomiting, or skin changes (rash, itching, or hives).   © 3428-9674 "[employer]. [address] [place] [address], [place] [address]. All rights reserved. This information is not intended as a substitute for professional medical care. Always follow your healthcare professional's instructions.                    Diet for Chronic Kidney Disease  Following a special diet when you have kidney disease can help you stay as healthy as possible. Your healthcare provider or dietitian should make a special diet plan just for you.    Eating right  Here are some good eating rules to follow:    · Protein. Eating protein is important for your body. But too much protein can put a strain on your kidneys. Eating less protein may slow the progression of chronic kidney disease. Foods high in protein include meat, fish, eggs, cheese, and other dairy products. A registered dietitian can help you plan a diet that has the right amount of protein for you.    · Sodium. Having too much salt in your diet can make your body hold onto (retain) water. Ask your provider or dietitian how much sodium per day you are allowed. This will help you avoid fluid buildup in your body (fluid retention). It can also help control high blood pressure. Learn to read food labels to know how much sodium is in one serving. Foods high in salt include processed meats, canned and boxed foods, sauces, salted chips and snacks, pickled foods, frozen dinners, and restaurant  and fast food.    · Fluids. If you have advanced kidney disease, you will need to limit the water and fluids you drink. If you dont, then too much water will build up in your body. The exact amount of fluid you can drink depends on how well your kidneys are working. Ask your provider how much water you can safely drink each day.    · Potassium. In advanced kidney disease, your potassium level can go dangerously high. This affects your heart. It can cause an irregular heartbeat (arrhythmia). Ask your provider or dietitian if you should limit potassium in your diet. Foods high in potassium include dairy products (milk, yogurt, cheese), dried beans, bananas, oranges, potatoes, tomatoes, spinach, cantaloupe, honeydew melon, dried fruits, and nuts.     · Calcium. Calcium is important to build strong bones. But foods high in calcium are also high in phosphorus, which can take calcium from your bones. Limiting foods high in phosphorus will help keep calcium in your bones. Ask your provider how much calcium you should get each day.    · Phosphorus. In advanced kidney disease, your phosphorus level can go dangerously high. This affects many systems in the body and can damage your heart. Limit your intake of phosphorus-rich foods. These include dried beans and peas, nuts, peanut butter, cocoa, beer, cola drinks, and dairy products.    Date Last Reviewed: 8/1/2016  © 6091-2335 [employer]. [address] [place] [address], [place] [address]. All rights reserved. This information is not intended as a substitute for professional medical care. Always follow your healthcare professional's instructions.          Kidney Disease: Eating a Safe Amount of Potassium  Potassium is a mineral found in many foods. The body needs some potassium to keep the heart working normally. But if your kidneys dont work well, potassium can build up in your blood. It can be serious and even deadly if the levels go up too high. By controlling the  amount of potassium you eat, you can keep a safe level in your blood.    Using this guide  Use this serving guide along with the food list below. Always follow your dietitians instructions on the number and size of servings to eat. You can saenz some of the potassium out of some high potassium foods by soaking and cooking them in large amounts of water. Ask your dietitian about how to do this. Also, talk with your dietitian before eating foods that arent on this list.  · ___ daily servings of foods that have high potassium content (250 mg to 500 mg per serving).  · ___ daily servings of foods that have medium potassium content (150 mg to 250 mg per serving).  · ___ daily servings of foods that have low potassium content (5 mg to 150 mg per serving).  · You can substitute food choices in the following way:    Potassium content of some foods      Vegetable Fruit Starches   High   Artichokes (1)  Bok clemencia (½ cup)  Spinach (½ cup)  Tomatoes (½ cup) Bananas (1)  Cantaloupe or honeydew  (½ melon)  Oranges (1)  Peaches, fresh (1) Beans, dried (½ cup)  Lentils (½ cup)  Potatoes (½ cup  or 1 small)  Winter squash,  yams (½ cup)   Medium   Broccoli (½ cup)  Carrots (½ cup)  Eggplant (½ cup)  Peppers (1) Apples (1)  Cherries (½ cup)  Peaches, canned (½ cup)  Pears, fresh (½ cup) Bread, pumpernickel  (1 slice)  Chickpeas, cooked (½ cup)  Corn, fresh (½ cup)  Tortillas, corn (4 small)   Low   Asparagus (4 melgar)  Green beans (½ cup)  Cauliflower (½ cup)  Cucumbers (½)    Blueberries (1 cup)  Grapefruit (½ cup)  Grapes (½ cup)  Strawberries (½ cup)  Watermelon (½ cup) Bagel, plain (1)  Bread, white (2 slices)  Oatmeal (¾ cup)  Pasta, plain (1 cup)  Rice, white (1 cup)     Date Last Reviewed: 1/1/2017  © 4454-1625 [employer]. [address] [place] [address]. All rights reserved. This information is not intended as a substitute for professional medical care. Always follow your healthcare professional's  instructions.      Potassium  Does this test have other names?  Serum potassium, serum electrolytes, K    What is this test?  This is a blood test to measure the amount of potassium in your blood. Potassium is one of several important minerals in your body called electrolytes. Ninety percent of your potassium is inside your cells, but a small amount circulates in your blood. You normally get potassium from your diet. Your body needs a constant level of potassium for normal nerve conduction, muscle contraction, heart function, and fluid balance. Your kidneys remove potassium through your urine.     Why do I need this test?  You may have this test as part of a routine blood test to check your level of electrolytes. You may also need this test if your healthcare provider suspects that your potassium is too high or too low. It's important to have your potassium level checked if you have diabetes, if you have a disease that affects your kidneys, adrenal glands, or digestive system, or if you are on medications such as diuretics, steroids, or digitalis.  A potassium level that is too high is called hyperkalemia. Symptoms of hyperkalemia include:  · Irregular heartbeat  · Nausea  · Fatigue  · Tingling or numbness  · Weakness or paralysis  A potassium level that is too low is called hypokalemia. Symptoms of hypokalemia include:  · Irregular heartbeat  · Nausea  · Muscle weakness  · Cramps  · Constipation     What other tests might I have along with this test?  You may have your potassium checked along with other electrolytes, such as sodium. You may also have an electrocardiogram, or ECG, to check your heart rhythm. An irregular heart rhythm is a dangerous sign if caused by an abnormal potassium level.     What do my test results mean?  Many things may affect your lab test results. These include the method each lab uses to do the test. Even if your test results are different from the normal value, you may not have a  problem. To learn what the results mean for you, talk with your healthcare provider.  Potassium is measured in milliequivalents per liter (mEq/L). Normal results are about:  · 3.5 to 5.2 mEq/L for adults  · 3.4 to 4.7 mEq/L for children ages 1 to 18 years old  Low blood potassium may be caused by:  · Loss of potassium from diarrhea, sweating, or vomiting  · Not getting enough potassium in your diet. This is sometimes seen in alcoholism.  · Loss of potassium from a severe burn or draining wound  · Diseases such as cystic fibrosis, primary aldosteronism, or alcoholism  · Medicines such as diuretics or antibiotics  · Getting IV fluids without enough potassium  High blood potassium may be caused by:  · Kidney disease or kidney failure  · Trauma such as burns, accidents, or surgery  · Uncontrolled diabetes  · Diseases such as systemic lupus erythematosus, sickle cell, or [place] disease     How is this test done?  The test requires a blood sample, which is drawn through a needle from a vein in your arm.     Does this test pose any risks?  Taking a blood sample with a needle carries risks that include bleeding, infection, bruising, or feeling dizzy. When the needle pricks your arm, you may feel a slight stinging sensation or pain. Afterward, the site may be slightly sore.    What might affect my test results?  Several medicines can affect your potassium level. These include penicillin, glucose, diuretics such as furosemide or hydrochlorthiazide, and nonsteroidal anti-inflammatory medicines. Eating a lot of licorice can decrease potassium levels.    How do I get ready for this test?  You don't need to prepare for this test. Tell your healthcare provider if you are taking any medicine, including over-the-counter NSAIDs. In fact, be sure your provider knows about all medicines, herbs, vitamins, and supplements you are taking. This includes medicines that don't need a prescription and any illicit drugs you may use.     ©  4822-1396 [employer]. [address] [place] [address], [place] [address]. All rights reserved. This information is not intended as a substitute for professional medical care. Always follow your healthcare professional's instructions.    PLEASE FOLLOW ANY ADDITIONAL INSTRUCTIONS GIVEN TO YOU BY DR KOEHLER!

## 2018-11-07 ENCOUNTER — HOSPITAL ENCOUNTER (EMERGENCY)
Facility: OTHER | Age: 53
Discharge: HOME | End: 2018-11-07
Attending: EMERGENCY MEDICINE
Payer: MEDICAID

## 2018-11-07 VITALS
HEIGHT: 66 IN | OXYGEN SATURATION: 99 % | BODY MASS INDEX: 22.98 KG/M2 | TEMPERATURE: 98 F | DIASTOLIC BLOOD PRESSURE: 66 MMHG | SYSTOLIC BLOOD PRESSURE: 104 MMHG | HEART RATE: 82 BPM | RESPIRATION RATE: 18 BRPM | WEIGHT: 143 LBS

## 2018-11-07 DIAGNOSIS — G89.18 ACUTE POSTOPERATIVE PAIN OF EXTREMITY: Primary | ICD-10-CM

## 2018-11-07 PROCEDURE — 99281 EMR DPT VST MAYX REQ PHY/QHP: CPT
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
## 2018-11-16 ENCOUNTER — HOSPITAL ENCOUNTER (INPATIENT)
Facility: OTHER | Age: 53
LOS: 11 days | Discharge: HOME | DRG: 252 | End: 2018-11-27
Attending: EMERGENCY MEDICINE | Admitting: INTERNAL MEDICINE
Payer: MEDICAID

## 2018-11-16 DIAGNOSIS — G40.909 SEIZURE DISORDER: ICD-10-CM

## 2018-11-16 DIAGNOSIS — Z99.2 ESRD (END STAGE RENAL DISEASE) ON DIALYSIS: ICD-10-CM

## 2018-11-16 DIAGNOSIS — T82.868A CLOTTED RENAL DIALYSIS ARTERIOVENOUS GRAFT, INITIAL ENCOUNTER: ICD-10-CM

## 2018-11-16 DIAGNOSIS — L03.113 CELLULITIS OF ARM, RIGHT: ICD-10-CM

## 2018-11-16 DIAGNOSIS — E87.5 HYPERKALEMIA: ICD-10-CM

## 2018-11-16 DIAGNOSIS — Z99.2 ESRD ON DIALYSIS: ICD-10-CM

## 2018-11-16 DIAGNOSIS — B20 HIV DISEASE: Chronic | ICD-10-CM

## 2018-11-16 DIAGNOSIS — N18.6 ESRD ON DIALYSIS: ICD-10-CM

## 2018-11-16 DIAGNOSIS — R61 DIAPHORESIS: ICD-10-CM

## 2018-11-16 DIAGNOSIS — D62 ACUTE BLOOD LOSS ANEMIA: ICD-10-CM

## 2018-11-16 DIAGNOSIS — T82.590A MECHANICAL COMPLICATION OF ARTERIOVENOUS FISTULA SURGICALLY CREATED, INITIAL ENCOUNTER: ICD-10-CM

## 2018-11-16 DIAGNOSIS — D64.9 ANEMIA, UNSPECIFIED TYPE: ICD-10-CM

## 2018-11-16 DIAGNOSIS — I10 ESSENTIAL HYPERTENSION: Chronic | ICD-10-CM

## 2018-11-16 DIAGNOSIS — D63.1 ANEMIA IN ESRD (END-STAGE RENAL DISEASE): ICD-10-CM

## 2018-11-16 DIAGNOSIS — N18.6 ESRD (END STAGE RENAL DISEASE) ON DIALYSIS: ICD-10-CM

## 2018-11-16 DIAGNOSIS — R20.0 LOSS OF FEELING OR SENSATION: ICD-10-CM

## 2018-11-16 DIAGNOSIS — N18.6 ANEMIA IN ESRD (END-STAGE RENAL DISEASE): ICD-10-CM

## 2018-11-16 DIAGNOSIS — T82.590A MECHANICAL COMPLICATION OF ARTERIOVENOUS FISTULA SURGICALLY CREATED: Primary | ICD-10-CM

## 2018-11-16 LAB
ALBUMIN SERPL BCP-MCNC: 2.8 G/DL
ALP SERPL-CCNC: 82 U/L
ALT SERPL W/O P-5'-P-CCNC: 8 U/L
ANION GAP SERPL CALC-SCNC: 12 MMOL/L
AST SERPL-CCNC: 13 U/L
BASOPHILS # BLD AUTO: 0.01 K/UL
BASOPHILS NFR BLD: 0.2 %
BILIRUB SERPL-MCNC: 0.3 MG/DL
BUN SERPL-MCNC: 52 MG/DL
CALCIUM SERPL-MCNC: 9.9 MG/DL
CHLORIDE SERPL-SCNC: 96 MMOL/L
CO2 SERPL-SCNC: 29 MMOL/L
CREAT SERPL-MCNC: 13.1 MG/DL
DIFFERENTIAL METHOD: ABNORMAL
EOSINOPHIL # BLD AUTO: 0.3 K/UL
EOSINOPHIL NFR BLD: 5 %
ERYTHROCYTE [DISTWIDTH] IN BLOOD BY AUTOMATED COUNT: 14.6 %
EST. GFR  (AFRICAN AMERICAN): 5 ML/MIN/1.73 M^2
EST. GFR  (NON AFRICAN AMERICAN): 4 ML/MIN/1.73 M^2
GLUCOSE SERPL-MCNC: 86 MG/DL
HCT VFR BLD AUTO: 24.9 %
HGB BLD-MCNC: 8 G/DL
INR PPP: 0.9
LYMPHOCYTES # BLD AUTO: 1.3 K/UL
LYMPHOCYTES NFR BLD: 20.6 %
MCH RBC QN AUTO: 31.3 PG
MCHC RBC AUTO-ENTMCNC: 32.1 G/DL
MCV RBC AUTO: 97 FL
MONOCYTES # BLD AUTO: 0.8 K/UL
MONOCYTES NFR BLD: 13.6 %
NEUTROPHILS # BLD AUTO: 3.8 K/UL
NEUTROPHILS NFR BLD: 60.6 %
PLATELET # BLD AUTO: 294 K/UL
PMV BLD AUTO: 9.7 FL
POCT GLUCOSE: 85 MG/DL (ref 70–110)
POTASSIUM SERPL-SCNC: 5.6 MMOL/L
PROT SERPL-MCNC: 8.6 G/DL
PROTHROMBIN TIME: 10.4 SEC
RBC # BLD AUTO: 2.56 M/UL
SODIUM SERPL-SCNC: 137 MMOL/L
WBC # BLD AUTO: 6.18 K/UL

## 2018-11-16 PROCEDURE — G0378 HOSPITAL OBSERVATION PER HR: HCPCS

## 2018-11-16 PROCEDURE — 99285 EMERGENCY DEPT VISIT HI MDM: CPT | Mod: 25

## 2018-11-16 PROCEDURE — 94761 N-INVAS EAR/PLS OXIMETRY MLT: CPT

## 2018-11-16 PROCEDURE — 82962 GLUCOSE BLOOD TEST: CPT

## 2018-11-16 PROCEDURE — 96365 THER/PROPH/DIAG IV INF INIT: CPT

## 2018-11-16 PROCEDURE — 99220 PR INITIAL OBSERVATION CARE,LEVL III: CPT | Mod: ,,, | Performed by: INTERNAL MEDICINE

## 2018-11-16 PROCEDURE — 96375 TX/PRO/DX INJ NEW DRUG ADDON: CPT

## 2018-11-16 PROCEDURE — 25000003 PHARM REV CODE 250: Performed by: EMERGENCY MEDICINE

## 2018-11-16 PROCEDURE — 85610 PROTHROMBIN TIME: CPT

## 2018-11-16 PROCEDURE — 80053 COMPREHEN METABOLIC PANEL: CPT

## 2018-11-16 PROCEDURE — 25000003 PHARM REV CODE 250: Performed by: INTERNAL MEDICINE

## 2018-11-16 PROCEDURE — 96368 THER/DIAG CONCURRENT INF: CPT

## 2018-11-16 PROCEDURE — 11000001 HC ACUTE MED/SURG PRIVATE ROOM

## 2018-11-16 PROCEDURE — 63600175 PHARM REV CODE 636 W HCPCS: Performed by: EMERGENCY MEDICINE

## 2018-11-16 PROCEDURE — 87040 BLOOD CULTURE FOR BACTERIA: CPT | Mod: 59

## 2018-11-16 PROCEDURE — 63600175 PHARM REV CODE 636 W HCPCS: Performed by: PHYSICIAN ASSISTANT

## 2018-11-16 PROCEDURE — 96366 THER/PROPH/DIAG IV INF ADDON: CPT

## 2018-11-16 PROCEDURE — 85025 COMPLETE CBC W/AUTO DIFF WBC: CPT

## 2018-11-16 PROCEDURE — 63600175 PHARM REV CODE 636 W HCPCS: Performed by: INTERNAL MEDICINE

## 2018-11-16 PROCEDURE — 25000003 PHARM REV CODE 250: Performed by: PHYSICIAN ASSISTANT

## 2018-11-16 RX ORDER — HYDROMORPHONE HYDROCHLORIDE 1 MG/ML
0.5 INJECTION, SOLUTION INTRAMUSCULAR; INTRAVENOUS; SUBCUTANEOUS ONCE
Status: COMPLETED | OUTPATIENT
Start: 2018-11-16 | End: 2018-11-16

## 2018-11-16 RX ORDER — AMLODIPINE BESYLATE 5 MG/1
10 TABLET ORAL DAILY
Status: DISCONTINUED | OUTPATIENT
Start: 2018-11-17 | End: 2018-11-27 | Stop reason: HOSPADM

## 2018-11-16 RX ORDER — VANCOMYCIN HCL IN 5 % DEXTROSE 1G/250ML
1000 PLASTIC BAG, INJECTION (ML) INTRAVENOUS
Status: COMPLETED | OUTPATIENT
Start: 2018-11-16 | End: 2018-11-16

## 2018-11-16 RX ORDER — MORPHINE SULFATE 4 MG/ML
2 INJECTION, SOLUTION INTRAMUSCULAR; INTRAVENOUS EVERY 4 HOURS PRN
Status: DISCONTINUED | OUTPATIENT
Start: 2018-11-16 | End: 2018-11-27 | Stop reason: HOSPADM

## 2018-11-16 RX ORDER — FLUOXETINE HYDROCHLORIDE 20 MG/1
40 CAPSULE ORAL DAILY
Status: DISCONTINUED | OUTPATIENT
Start: 2018-11-17 | End: 2018-11-27 | Stop reason: HOSPADM

## 2018-11-16 RX ORDER — SEVELAMER CARBONATE 800 MG/1
1600 TABLET, FILM COATED ORAL
Status: DISCONTINUED | OUTPATIENT
Start: 2018-11-16 | End: 2018-11-27 | Stop reason: HOSPADM

## 2018-11-16 RX ORDER — OXYCODONE AND ACETAMINOPHEN 7.5; 325 MG/1; MG/1
1 TABLET ORAL EVERY 4 HOURS PRN
Status: DISCONTINUED | OUTPATIENT
Start: 2018-11-16 | End: 2018-11-19

## 2018-11-16 RX ORDER — PANTOPRAZOLE SODIUM 40 MG/1
40 TABLET, DELAYED RELEASE ORAL DAILY
Status: DISCONTINUED | OUTPATIENT
Start: 2018-11-17 | End: 2018-11-27 | Stop reason: HOSPADM

## 2018-11-16 RX ORDER — HYDROMORPHONE HYDROCHLORIDE 1 MG/ML
0.5 INJECTION, SOLUTION INTRAMUSCULAR; INTRAVENOUS; SUBCUTANEOUS
Status: COMPLETED | OUTPATIENT
Start: 2018-11-16 | End: 2018-11-16

## 2018-11-16 RX ORDER — LEVETIRACETAM 250 MG/1
500 TABLET ORAL 2 TIMES DAILY
Status: DISCONTINUED | OUTPATIENT
Start: 2018-11-16 | End: 2018-11-27 | Stop reason: HOSPADM

## 2018-11-16 RX ORDER — GENTAMICIN SULFATE 80 MG/100ML
80 INJECTION, SOLUTION INTRAVENOUS
Status: COMPLETED | OUTPATIENT
Start: 2018-11-16 | End: 2018-11-16

## 2018-11-16 RX ORDER — MORPHINE SULFATE 10 MG/ML
2 INJECTION INTRAMUSCULAR; INTRAVENOUS; SUBCUTANEOUS
Status: COMPLETED | OUTPATIENT
Start: 2018-11-16 | End: 2018-11-16

## 2018-11-16 RX ORDER — RAMELTEON 8 MG/1
8 TABLET ORAL NIGHTLY PRN
Status: DISCONTINUED | OUTPATIENT
Start: 2018-11-16 | End: 2018-11-27 | Stop reason: HOSPADM

## 2018-11-16 RX ADMIN — DEXTROSE MONOHYDRATE 25 G: 25 INJECTION, SOLUTION INTRAVENOUS at 11:11

## 2018-11-16 RX ADMIN — OXYCODONE HYDROCHLORIDE AND ACETAMINOPHEN 1 TABLET: 7.5; 325 TABLET ORAL at 11:11

## 2018-11-16 RX ADMIN — MORPHINE SULFATE 2 MG: 4 INJECTION, SOLUTION INTRAMUSCULAR; INTRAVENOUS at 04:11

## 2018-11-16 RX ADMIN — MORPHINE SULFATE 2 MG: 10 INJECTION INTRAVENOUS at 01:11

## 2018-11-16 RX ADMIN — GENTAMICIN SULFATE 80 MG: 80 INJECTION, SOLUTION INTRAVENOUS at 02:11

## 2018-11-16 RX ADMIN — SODIUM POLYSTYRENE SULFONATE 30 G: 15 SUSPENSION ORAL; RECTAL at 09:11

## 2018-11-16 RX ADMIN — OXYCODONE HYDROCHLORIDE AND ACETAMINOPHEN 1 TABLET: 7.5; 325 TABLET ORAL at 05:11

## 2018-11-16 RX ADMIN — HYDROMORPHONE HYDROCHLORIDE 0.5 MG: 1 INJECTION, SOLUTION INTRAMUSCULAR; INTRAVENOUS; SUBCUTANEOUS at 01:11

## 2018-11-16 RX ADMIN — HYDROMORPHONE HYDROCHLORIDE 0.5 MG: 1 INJECTION, SOLUTION INTRAMUSCULAR; INTRAVENOUS; SUBCUTANEOUS at 07:11

## 2018-11-16 RX ADMIN — INSULIN HUMAN 10 UNITS: 100 INJECTION, SOLUTION PARENTERAL at 11:11

## 2018-11-16 RX ADMIN — LEVETIRACETAM 500 MG: 500 TABLET, FILM COATED ORAL at 09:11

## 2018-11-16 RX ADMIN — VANCOMYCIN HYDROCHLORIDE 1000 MG: 1 INJECTION, POWDER, LYOPHILIZED, FOR SOLUTION INTRAVENOUS at 01:11

## 2018-11-16 NOTE — ED TRIAGE NOTES
Pt reports he had a graft placed to the right upper arm on 11/1. Pt woke up this am with severe pain to the right arm. Swelling noted with warmth to touch. 2+ radial pulse noted. Thrill heard with doppler.

## 2018-11-16 NOTE — ED NOTES
Dr. Apple at bedside for assessment of pt. Confirmed plan to admit. Pt lying in stretcher in  NAD with even, unlabored respirations. Sucking on lollipop. Abx infusions completed. No new orders at this time.
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
## 2018-11-17 LAB
ANION GAP SERPL CALC-SCNC: 13 MMOL/L
BASOPHILS # BLD AUTO: 0.01 K/UL
BASOPHILS NFR BLD: 0.1 %
BUN SERPL-MCNC: 63 MG/DL
CALCIUM SERPL-MCNC: 9.4 MG/DL
CHLORIDE SERPL-SCNC: 94 MMOL/L
CO2 SERPL-SCNC: 29 MMOL/L
CREAT SERPL-MCNC: 14.6 MG/DL
DIFFERENTIAL METHOD: ABNORMAL
EOSINOPHIL # BLD AUTO: 0.3 K/UL
EOSINOPHIL NFR BLD: 3 %
ERYTHROCYTE [DISTWIDTH] IN BLOOD BY AUTOMATED COUNT: 15.2 %
EST. GFR  (AFRICAN AMERICAN): 4 ML/MIN/1.73 M^2
EST. GFR  (NON AFRICAN AMERICAN): 3 ML/MIN/1.73 M^2
GLUCOSE SERPL-MCNC: 113 MG/DL
HCT VFR BLD AUTO: 24.1 %
HGB BLD-MCNC: 7.7 G/DL
LYMPHOCYTES # BLD AUTO: 1.3 K/UL
LYMPHOCYTES NFR BLD: 15.3 %
MCH RBC QN AUTO: 30.7 PG
MCHC RBC AUTO-ENTMCNC: 32 G/DL
MCV RBC AUTO: 96 FL
MONOCYTES # BLD AUTO: 0.7 K/UL
MONOCYTES NFR BLD: 8 %
NEUTROPHILS # BLD AUTO: 6.2 K/UL
NEUTROPHILS NFR BLD: 73.5 %
PLATELET # BLD AUTO: 302 K/UL
PMV BLD AUTO: 9.3 FL
POCT GLUCOSE: 102 MG/DL (ref 70–110)
POCT GLUCOSE: 112 MG/DL (ref 70–110)
POCT GLUCOSE: 112 MG/DL (ref 70–110)
POCT GLUCOSE: 128 MG/DL (ref 70–110)
POCT GLUCOSE: 40 MG/DL (ref 70–110)
POCT GLUCOSE: 53 MG/DL (ref 70–110)
POTASSIUM SERPL-SCNC: 5.6 MMOL/L
RBC # BLD AUTO: 2.51 M/UL
SODIUM SERPL-SCNC: 136 MMOL/L
WBC # BLD AUTO: 8.45 K/UL

## 2018-11-17 PROCEDURE — 80100016 HC MAINTENANCE HEMODIALYSIS

## 2018-11-17 PROCEDURE — 63600175 PHARM REV CODE 636 W HCPCS: Performed by: INTERNAL MEDICINE

## 2018-11-17 PROCEDURE — 36415 COLL VENOUS BLD VENIPUNCTURE: CPT

## 2018-11-17 PROCEDURE — 93005 ELECTROCARDIOGRAM TRACING: CPT

## 2018-11-17 PROCEDURE — 25000003 PHARM REV CODE 250: Performed by: PHYSICIAN ASSISTANT

## 2018-11-17 PROCEDURE — 93010 ELECTROCARDIOGRAM REPORT: CPT | Mod: ,,, | Performed by: INTERNAL MEDICINE

## 2018-11-17 PROCEDURE — 80048 BASIC METABOLIC PNL TOTAL CA: CPT

## 2018-11-17 PROCEDURE — 85025 COMPLETE CBC W/AUTO DIFF WBC: CPT

## 2018-11-17 PROCEDURE — 25000003 PHARM REV CODE 250: Performed by: INTERNAL MEDICINE

## 2018-11-17 PROCEDURE — 11000001 HC ACUTE MED/SURG PRIVATE ROOM

## 2018-11-17 PROCEDURE — 99900035 HC TECH TIME PER 15 MIN (STAT)

## 2018-11-17 PROCEDURE — 80177 DRUG SCRN QUAN LEVETIRACETAM: CPT

## 2018-11-17 PROCEDURE — 94761 N-INVAS EAR/PLS OXIMETRY MLT: CPT

## 2018-11-17 PROCEDURE — 99226 PR SUBSEQUENT OBSERVATION CARE,LEVEL III: CPT | Mod: ,,, | Performed by: INTERNAL MEDICINE

## 2018-11-17 PROCEDURE — G0378 HOSPITAL OBSERVATION PER HR: HCPCS

## 2018-11-17 RX ORDER — HEPARIN SODIUM 5000 [USP'U]/ML
5000 INJECTION, SOLUTION INTRAVENOUS; SUBCUTANEOUS
Status: DISPENSED | OUTPATIENT
Start: 2018-11-17 | End: 2018-11-24

## 2018-11-17 RX ORDER — SODIUM CHLORIDE 9 MG/ML
INJECTION, SOLUTION INTRAVENOUS ONCE
Status: COMPLETED | OUTPATIENT
Start: 2018-11-17 | End: 2018-11-17

## 2018-11-17 RX ORDER — SODIUM CHLORIDE 9 MG/ML
INJECTION, SOLUTION INTRAVENOUS
Status: DISCONTINUED | OUTPATIENT
Start: 2018-11-17 | End: 2018-11-27 | Stop reason: HOSPADM

## 2018-11-17 RX ORDER — IBUPROFEN 200 MG
16 TABLET ORAL
Status: DISCONTINUED | OUTPATIENT
Start: 2018-11-17 | End: 2018-11-27 | Stop reason: HOSPADM

## 2018-11-17 RX ORDER — VANCOMYCIN HCL IN 5 % DEXTROSE 1G/250ML
1000 PLASTIC BAG, INJECTION (ML) INTRAVENOUS
Status: COMPLETED | OUTPATIENT
Start: 2018-11-17 | End: 2018-11-17

## 2018-11-17 RX ORDER — LORAZEPAM 2 MG/ML
4 INJECTION INTRAMUSCULAR ONCE AS NEEDED
Status: DISCONTINUED | OUTPATIENT
Start: 2018-11-17 | End: 2018-11-17

## 2018-11-17 RX ORDER — GLUCAGON 1 MG
1 KIT INJECTION
Status: DISCONTINUED | OUTPATIENT
Start: 2018-11-17 | End: 2018-11-27 | Stop reason: HOSPADM

## 2018-11-17 RX ORDER — IBUPROFEN 200 MG
24 TABLET ORAL
Status: DISCONTINUED | OUTPATIENT
Start: 2018-11-17 | End: 2018-11-27 | Stop reason: HOSPADM

## 2018-11-17 RX ORDER — SODIUM CHLORIDE 9 MG/ML
INJECTION, SOLUTION INTRAVENOUS ONCE
Status: DISCONTINUED | OUTPATIENT
Start: 2018-11-17 | End: 2018-11-27 | Stop reason: HOSPADM

## 2018-11-17 RX ORDER — GENTAMICIN SULFATE 80 MG/100ML
80 INJECTION, SOLUTION INTRAVENOUS
Status: COMPLETED | OUTPATIENT
Start: 2018-11-17 | End: 2018-11-17

## 2018-11-17 RX ADMIN — OXYCODONE HYDROCHLORIDE AND ACETAMINOPHEN 1 TABLET: 7.5; 325 TABLET ORAL at 10:11

## 2018-11-17 RX ADMIN — ERYTHROPOIETIN 10000 UNITS: 10000 INJECTION, SOLUTION INTRAVENOUS; SUBCUTANEOUS at 10:11

## 2018-11-17 RX ADMIN — HEPARIN SODIUM 5000 UNITS: 5000 INJECTION, SOLUTION INTRAVENOUS; SUBCUTANEOUS at 12:11

## 2018-11-17 RX ADMIN — SEVELAMER CARBONATE 1600 MG: 800 TABLET, FILM COATED ORAL at 05:11

## 2018-11-17 RX ADMIN — OXYCODONE HYDROCHLORIDE AND ACETAMINOPHEN 1 TABLET: 7.5; 325 TABLET ORAL at 05:11

## 2018-11-17 RX ADMIN — SEVELAMER CARBONATE 1600 MG: 800 TABLET, FILM COATED ORAL at 10:11

## 2018-11-17 RX ADMIN — LEVETIRACETAM 500 MG: 500 TABLET, FILM COATED ORAL at 10:11

## 2018-11-17 RX ADMIN — SEVELAMER CARBONATE 1600 MG: 800 TABLET, FILM COATED ORAL at 01:11

## 2018-11-17 RX ADMIN — SODIUM CHLORIDE: 0.9 INJECTION, SOLUTION INTRAVENOUS at 10:11

## 2018-11-17 RX ADMIN — PANTOPRAZOLE SODIUM 40 MG: 40 TABLET, DELAYED RELEASE ORAL at 10:11

## 2018-11-17 RX ADMIN — Medication 24 G: at 12:11

## 2018-11-17 RX ADMIN — GENTAMICIN SULFATE 80 MG: 80 INJECTION, SOLUTION INTRAVENOUS at 11:11

## 2018-11-17 RX ADMIN — VANCOMYCIN HYDROCHLORIDE 1000 MG: 1 INJECTION, POWDER, LYOPHILIZED, FOR SOLUTION INTRAVENOUS at 11:11

## 2018-11-17 RX ADMIN — LEVETIRACETAM 500 MG: 500 TABLET, FILM COATED ORAL at 09:11

## 2018-11-17 RX ADMIN — FLUOXETINE 40 MG: 20 CAPSULE ORAL at 10:11

## 2018-11-17 NOTE — PROGRESS NOTES
[facility] Hospital Medicine  Progress Note    Patient Name: Greyson Hudson III  MRN: [id number]  Patient Class: OP- Observation   Admission Date: 11/16/2018  Length of Stay: 0 days  Attending Physician: Audrey Rider MD  Primary Care Provider: Primary Doctor No        Subjective:     Principal Problem:Mechanical complication of arteriovenous fistula surgically created    HPI:  52 year old male with past medical history of htn, esrd on hd m/w/f, seizure disorder had av graft placement in right arm about 2 weeks ago.He came in c/o pain in right arm , more worse today.He went to HD today but did not do it as he was hurting in his arm.He denies fever, chills, nausea, vomiting.Says cannot move his arm much.He did not fever or wbc in er.Was given a dose of vanc and gentamycin.Patient had a left side tunneled catheter for hd.He aster said he ran out of his pain meds.    Hospital Course:  Still c/o pain and swelling in right arm, no fever, cx negative so far.Got HD on 11/17/18.    Interval History: seen in HD, right arm pain    Review of Systems   Constitutional: Negative for chills and fever.   HENT: Negative for trouble swallowing.    Respiratory: Negative for cough and shortness of breath.    Cardiovascular: Negative for chest pain and leg swelling.   Gastrointestinal: Negative for abdominal pain, diarrhea and vomiting.   Genitourinary: Negative for dysuria and hematuria.   Musculoskeletal:        Right arm pain and swelling   Skin: Negative for rash.   Neurological: Negative for numbness and headaches.   Psychiatric/Behavioral: Negative for confusion.     Objective:     Vital Signs (Most Recent):  Temp: 98.4 °F (36.9 °C) (11/17/18 1220)  Pulse: 104 (11/17/18 1220)  Resp: 18 (11/17/18 1220)  BP: 113/68 (11/17/18 1220)  SpO2: 97 % (11/17/18 0802) Vital Signs (24h Range):  Temp:  [97.5 °F (36.4 °C)-99.1 °F (37.3 °C)] 98.4 °F (36.9 °C)  Pulse:  [] 104  Resp:  [16-25] 18  SpO2:  [93 %-100 %] 97 %  BP:  ()/(63-96) 113/68     Weight: 68.4 kg (150 lb 12.7 oz)  Body mass index is 24.34 kg/m².    Intake/Output Summary (Last 24 hours) at 11/17/2018 1247  Last data filed at 11/17/2018 1215  Gross per 24 hour   Intake 850 ml   Output 2500 ml   Net -1650 ml      Physical Exam   Constitutional: He is oriented to person, place, and time.   Mild distress   HENT:   Head: Normocephalic and atraumatic.   Eyes: EOM are normal. Pupils are equal, round, and reactive to light.   Neck: Normal range of motion. Neck supple.   Cardiovascular: Normal rate and regular rhythm.   Pulmonary/Chest: Effort normal and breath sounds normal. No respiratory distress.   Abdominal: Soft. Bowel sounds are normal. He exhibits no distension. There is no tenderness.   Musculoskeletal:   Right arm , av graft in upper arm with thrill , some redness seen around the upper arm near the shoulder with induration and tenderness, no discharge or open lesions,some  decreased rom of right shoulder   Neurological: He is alert and oriented to person, place, and time. No cranial nerve deficit.   Skin: Skin is warm.   Psychiatric: He has a normal mood and affect.       Significant Labs:   CBC:   Recent Labs   Lab 11/16/18  1230 11/17/18  0614   WBC 6.18 8.45   HGB 8.0* 7.7*   HCT 24.9* 24.1*    302     CMP:   Recent Labs   Lab 11/16/18  1230 11/17/18  0614    136   K 5.6* 5.6*   CL 96 94*   CO2 29 29   GLU 86 113*   BUN 52* 63*   CREATININE 13.1* 14.6*   CALCIUM 9.9 9.4   PROT 8.6*  --    ALBUMIN 2.8*  --    BILITOT 0.3  --    ALKPHOS 82  --    AST 13  --    ALT 8*  --    ANIONGAP 12 13   EGFRNONAA 4* 3*       Significant Imaging: I have reviewed all pertinent imaging results/findings within the past 24 hours.    Assessment/Plan:      * Mechanical complication of arteriovenous fistula surgically created      Likely swelling from recent graft placement  ? Some cellulitis of right arm  Discussed with dr coombs  Will follow cx , negative so far    continue vanc and gentamycin with hd and monitor the arm  Prn pain meds         Hyperkalemia    Mild hyperkalemia,  S/p 1 dose of 50 and iv regular insulin on 11/16, monitor with  am labs  Secondary to esrd  S/p hd on 11/17          Seizure disorder    Continue home meds       Anemia in ESRD (end-stage renal disease)    No active bleeding , monitor  On procrit with hd  Will monitor labs, may need blood with next hd     ESRD (end stage renal disease) on dialysis    Patient missed hd 11/16  S/p hd 11/17  Nephrology consulted       Essential hypertension    Continue norvasc and monitor bp  stable       VTE Risk Mitigation (From admission, onward)        Ordered     heparin (porcine) injection 5,000 Units  As needed (PRN)      11/17/18 1111     IP VTE HIGH RISK PATIENT  Once      11/16/18 1644     Place sequential compression device  Until discontinued      11/16/18 1644              Audrey Rider MD  Department of Hospital Medicine   [facility]

## 2018-11-17 NOTE — ASSESSMENT & PLAN NOTE
Likely swelling from recent graft placement  Discussed with dr coombs  Will follow cx , negative so far   continue vanc and gentamycin with hd and monitor the arm  Prn pain meds

## 2018-11-17 NOTE — ASSESSMENT & PLAN NOTE
Mild hyperkalemia,  Give give 1 dose of 50 and iv regular insulin, monitor with  am labs  Secondary to esrd
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
## 2018-11-17 NOTE — SUBJECTIVE & OBJECTIVE
Interval History: seen in HD, right arm pain    Review of Systems   Constitutional: Negative for chills and fever.   HENT: Negative for trouble swallowing.    Respiratory: Negative for cough and shortness of breath.    Cardiovascular: Negative for chest pain and leg swelling.   Gastrointestinal: Negative for abdominal pain, diarrhea and vomiting.   Genitourinary: Negative for dysuria and hematuria.   Musculoskeletal:        Right arm pain and swelling   Skin: Negative for rash.   Neurological: Negative for numbness and headaches.   Psychiatric/Behavioral: Negative for confusion.     Objective:     Vital Signs (Most Recent):  Temp: 98.4 °F (36.9 °C) (11/17/18 1220)  Pulse: 104 (11/17/18 1220)  Resp: 18 (11/17/18 1220)  BP: 113/68 (11/17/18 1220)  SpO2: 97 % (11/17/18 0802) Vital Signs (24h Range):  Temp:  [97.5 °F (36.4 °C)-99.1 °F (37.3 °C)] 98.4 °F (36.9 °C)  Pulse:  [] 104  Resp:  [16-25] 18  SpO2:  [93 %-100 %] 97 %  BP: ()/(63-96) 113/68     Weight: 68.4 kg (150 lb 12.7 oz)  Body mass index is 24.34 kg/m².    Intake/Output Summary (Last 24 hours) at 11/17/2018 1247  Last data filed at 11/17/2018 1215  Gross per 24 hour   Intake 850 ml   Output 2500 ml   Net -1650 ml      Physical Exam   Constitutional: He is oriented to person, place, and time.   Mild distress   HENT:   Head: Normocephalic and atraumatic.   Eyes: EOM are normal. Pupils are equal, round, and reactive to light.   Neck: Normal range of motion. Neck supple.   Cardiovascular: Normal rate and regular rhythm.   Pulmonary/Chest: Effort normal and breath sounds normal. No respiratory distress.   Abdominal: Soft. Bowel sounds are normal. He exhibits no distension. There is no tenderness.   Musculoskeletal:   Right arm , av graft in upper arm with thrill , some redness seen around the upper arm near the shoulder with induration and tenderness, no discharge or open lesions,some  decreased rom of right shoulder   Neurological: He is alert and  oriented to person, place, and time. No cranial nerve deficit.   Skin: Skin is warm.   Psychiatric: He has a normal mood and affect.       Significant Labs:   CBC:   Recent Labs   Lab 11/16/18  1230 11/17/18  0614   WBC 6.18 8.45   HGB 8.0* 7.7*   HCT 24.9* 24.1*    302     CMP:   Recent Labs   Lab 11/16/18  1230 11/17/18  0614    136   K 5.6* 5.6*   CL 96 94*   CO2 29 29   GLU 86 113*   BUN 52* 63*   CREATININE 13.1* 14.6*   CALCIUM 9.9 9.4   PROT 8.6*  --    ALBUMIN 2.8*  --    BILITOT 0.3  --    ALKPHOS 82  --    AST 13  --    ALT 8*  --    ANIONGAP 12 13   EGFRNONAA 4* 3*       Significant Imaging: I have reviewed all pertinent imaging results/findings within the past 24 hours.

## 2018-11-17 NOTE — ASSESSMENT & PLAN NOTE
Mild hyperkalemia,  S/p 1 dose of 50 and iv regular insulin on 11/16, monitor with  am labs  Secondary to esrd  S/p hd on 11/17

## 2018-11-17 NOTE — HPI
52 year old male with past medical history of htn, esrd on hd m/w/f, seizure disorder had av graft placement in right arm about 2 weeks ago.He came in c/o pain in right arm , more worse today.He went to HD today but did not do it as he was hurting in his arm.He denies fever, chills, nausea, vomiting.Says cannot move his arm much.He did not fever or wbc in er.Was given a dose of vanc and gentamycin.Patient had a left side tunneled catheter for hd.He aster said he ran out of his pain meds.

## 2018-11-17 NOTE — NURSING
"Pt c/o 10/10 pain to right arm; pt states pain is unrelieved by IV Morphine and oral percocet;pt states " I feel like my arm is falling off"  Kylah Laura notified of patients complaint; new orders given see mar   "

## 2018-11-17 NOTE — NURSING
Pt told PCT he was feeling like he was about to have a seizure. Ordered BGC level and called NP. Stat EKG ordered and Keppra level. BGC 40. NP put in orders for glucose and pt given medication. Pt is responsive, awake and talking in full sentences entire time. Crackers given, orange juice and sandwich brought in. Will recheck BGC 15 minutes from time medication given. Pt states he is feeling better. No longer diaphoretic. Pt states he is feeling better. Will continue to monitor.

## 2018-11-17 NOTE — NURSING
Received a call from lab to notify me that the pt's blood culture returned positive, 1 gram cocci with clusters resembling staph. Notified Dr. Rider of this.

## 2018-11-17 NOTE — ASSESSMENT & PLAN NOTE
Likely swelling from recent graft placement  ? Some cellulitis of right arm  Discussed with dr coombs  Will follow cx , negative so far   continue vanc and gentamycin with hd and monitor the arm  Prn pain meds

## 2018-11-17 NOTE — PLAN OF CARE
Problem: Patient Care Overview  Goal: Plan of Care Review  Outcome: Ongoing (interventions implemented as appropriate)  Pt in bed complaining of pain to right arm; Kylah ZHAO aware; Right upper arm warm to touch; pt complaints of tender to touch; painful; medication being given at this time; NADN; will monitor until oncoming nurse arrves

## 2018-11-17 NOTE — ASSESSMENT & PLAN NOTE
Likely swelling from recent graft placement  Discussed with dr coombs  Will follow cx and continue vanc and gentamycin likely with hd and monitor the arm  Prn pain meds

## 2018-11-17 NOTE — NURSING
Pt arm is still edematous and painful with movement. Pt states it feels like something is going to burst and leak out. Arm has been elevated higher. Pt AOx4, VSS, arm does not feel extremely warm to touch, No redness noted to area. Call bell within reach. Bed low and locked. Will continue to monitor

## 2018-11-17 NOTE — CONSULTS
DATE OF CONSULTATION:  11/16/2018    HISTORY OF PRESENT ILLNESS:  The patient is a 52-year-old male with end-stage   renal disease who is 15 days status post construction of a Bronson-Anthony AV graft of   the right arm.  The patient presents complaining of pain and swelling of the   right arm.  The patient was seen in the office approximately 8 days ago and   appeared to have a normal postop course with normal postsurgical physical   findings.    On physical exam today, the patient's arm is slightly swollen in the upper   aspects near the incisions.  There is no purulent drainage.  There is expected   surgical tenderness.  There is a positive thrill.  The neurovascular exam of the   right hand is intact.  There is no gross motor deficit.    IMPRESSION:  Status post Bronson-Anthony AV fistula.  This does not appear to be   grossly infected.  The patient does not appear to have a steal syndrome.  At   this point, we will continue to follow through his hospital course.      ELADIO  dd: 11/16/2018 15:45:36 (CST)  td: 11/16/2018 19:17:59 (CST)  Doc ID   #[id number]  Job ID #[id number]    CC:

## 2018-11-17 NOTE — NURSING
BGC level chase to 53 at 0115. Rechecked at 0200 and it is now 112. Pt ambulated to bathroom independently and had a large BM. Pt states he is feeling much better.
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
## 2018-11-17 NOTE — PLAN OF CARE
Discharge planner met with patient at the bedside, Patient denies any inpatient admissions less than 30 days.  Verified PCP as:  Dr. Torres; Preferred pharmacy: [facility] in [place], [place]. Patient is an ESRD patient. Denies Coumadin for home use; Denies active home health services; Denies DME for home use. Patient is independent in his ADL's; reside at a personal care home; with access to a  whom assist with transportation needs. Patient uses medicaid transportation services when SW not available. Patient requesting a cab voucher at the time of discharge.  Dc plan is home with no needs.   11/17/18 1320   Discharge Assessment   Assessment Type Discharge Planning Assessment   Confirmed/corrected address and phone number on facesheet? Yes   Assessment information obtained from? Patient    Prior to hospitilization cognitive status: Alert/ Oriented    Prior to hospitalization functional status: Independent    Lives With Room mate   Patient's perception of discharge disposition home or selfcare   Patient currently being followed by outpatient case management? No   Dialysis Name and Scheduled days M-W-F dialysis   Discharge Plan A Home

## 2018-11-17 NOTE — NURSING
"Pt called nursing station to see nurse. I went into room and pt states his R upper arm is more swollen. States his fingers are a little "tingly" and when he rubs from his elbow to his fingers posteriorly, he has no sensation. NP notified. Elevate, ice and continue to monitor. Ice brought to room, arm elevated and iced. Will continue to monitor. Call bell within reach. Bed low and locked.   "

## 2018-11-18 PROBLEM — R78.81 BACTEREMIA: Status: ACTIVE | Noted: 2018-11-18

## 2018-11-18 LAB
ANION GAP SERPL CALC-SCNC: 16 MMOL/L
BUN SERPL-MCNC: 42 MG/DL
CALCIUM SERPL-MCNC: 9.6 MG/DL
CHLORIDE SERPL-SCNC: 95 MMOL/L
CO2 SERPL-SCNC: 27 MMOL/L
CREAT SERPL-MCNC: 10.3 MG/DL
EST. GFR  (AFRICAN AMERICAN): 6 ML/MIN/1.73 M^2
EST. GFR  (NON AFRICAN AMERICAN): 5 ML/MIN/1.73 M^2
GLUCOSE SERPL-MCNC: 90 MG/DL
POTASSIUM SERPL-SCNC: 4.6 MMOL/L
SODIUM SERPL-SCNC: 138 MMOL/L

## 2018-11-18 PROCEDURE — 36415 COLL VENOUS BLD VENIPUNCTURE: CPT

## 2018-11-18 PROCEDURE — 80048 BASIC METABOLIC PNL TOTAL CA: CPT

## 2018-11-18 PROCEDURE — G0378 HOSPITAL OBSERVATION PER HR: HCPCS

## 2018-11-18 PROCEDURE — 63600175 PHARM REV CODE 636 W HCPCS: Performed by: INTERNAL MEDICINE

## 2018-11-18 PROCEDURE — 11000001 HC ACUTE MED/SURG PRIVATE ROOM

## 2018-11-18 PROCEDURE — 25000003 PHARM REV CODE 250: Performed by: INTERNAL MEDICINE

## 2018-11-18 PROCEDURE — 94761 N-INVAS EAR/PLS OXIMETRY MLT: CPT

## 2018-11-18 PROCEDURE — 99226 PR SUBSEQUENT OBSERVATION CARE,LEVEL III: CPT | Mod: ,,, | Performed by: INTERNAL MEDICINE

## 2018-11-18 RX ORDER — HYDROMORPHONE HYDROCHLORIDE 1 MG/ML
0.5 INJECTION, SOLUTION INTRAMUSCULAR; INTRAVENOUS; SUBCUTANEOUS ONCE
Status: COMPLETED | OUTPATIENT
Start: 2018-11-18 | End: 2018-11-18

## 2018-11-18 RX ADMIN — OXYCODONE HYDROCHLORIDE AND ACETAMINOPHEN 1 TABLET: 7.5; 325 TABLET ORAL at 09:11

## 2018-11-18 RX ADMIN — OXYCODONE HYDROCHLORIDE AND ACETAMINOPHEN 1 TABLET: 7.5; 325 TABLET ORAL at 04:11

## 2018-11-18 RX ADMIN — LEVETIRACETAM 500 MG: 500 TABLET, FILM COATED ORAL at 09:11

## 2018-11-18 RX ADMIN — SEVELAMER CARBONATE 1600 MG: 800 TABLET, FILM COATED ORAL at 09:11

## 2018-11-18 RX ADMIN — SEVELAMER CARBONATE 1600 MG: 800 TABLET, FILM COATED ORAL at 07:11

## 2018-11-18 RX ADMIN — FLUOXETINE 40 MG: 20 CAPSULE ORAL at 09:11

## 2018-11-18 RX ADMIN — OXYCODONE HYDROCHLORIDE AND ACETAMINOPHEN 1 TABLET: 7.5; 325 TABLET ORAL at 05:11

## 2018-11-18 RX ADMIN — SEVELAMER CARBONATE 1600 MG: 800 TABLET, FILM COATED ORAL at 02:11

## 2018-11-18 RX ADMIN — AMLODIPINE BESYLATE 10 MG: 5 TABLET ORAL at 09:11

## 2018-11-18 RX ADMIN — PANTOPRAZOLE SODIUM 40 MG: 40 TABLET, DELAYED RELEASE ORAL at 09:11

## 2018-11-18 RX ADMIN — HYDROMORPHONE HYDROCHLORIDE 0.5 MG: 1 INJECTION, SOLUTION INTRAMUSCULAR; INTRAVENOUS; SUBCUTANEOUS at 09:11

## 2018-11-18 NOTE — PLAN OF CARE
Problem: Patient Care Overview  Goal: Plan of Care Review  Outcome: Ongoing (interventions implemented as appropriate)  Plan of care reviewed with pt. AAOx4, NAD. VS and labs reviewed. Received report on pt's Blood Culture. Pt's blood culture was positive for Staph. Provider was notified. No new orders at this time. Pt tolerated IV abx well. No adverse reaction. Pt's right arm has been elevated at rest throughout the day. Pain managed with PO Percocet. Safety measures implemented. Bed is lowered and locked. Call light is within reach. Will continue to monitor.

## 2018-11-18 NOTE — PROGRESS NOTES
[facility] Hospital Medicine  Progress Note    Patient Name: Greyson Hudson III  MRN: [id number]  Patient Class: OP- Observation   Admission Date: 11/16/2018  Length of Stay: 0 days  Attending Physician: Audrey Rider MD  Primary Care Provider: James Torres MD        Subjective:     Principal Problem:Mechanical complication of arteriovenous fistula surgically created    HPI:  52 year old male with past medical history of htn, esrd on hd m/w/f, seizure disorder had av graft placement in right arm about 2 weeks ago.He came in c/o pain in right arm , more worse today.He went to HD today but did not do it as he was hurting in his arm.He denies fever, chills, nausea, vomiting.Says cannot move his arm much.He did not fever or wbc in er.Was given a dose of vanc and gentamycin.Patient had a left side tunneled catheter for hd.He aster said he ran out of his pain meds.    Hospital Course:  Still c/o pain and swelling in right arm, no fever, cx negative so far.Got HD on 11/17/18.US non vascular showed 11 cm hypoechoic avascular lesion in right axillary region that could reflect abscess, hematoma, soft tissue mass.Patients pain better , able to move arm better.    Interval History: right arm pain better, able to move it better, still c/o swelling, no fever.    Review of Systems   Constitutional: Negative for chills and fever.   HENT: Negative for trouble swallowing.    Respiratory: Negative for cough and shortness of breath.    Cardiovascular: Negative for chest pain and leg swelling.   Gastrointestinal: Negative for abdominal pain, diarrhea and vomiting.   Genitourinary: Negative for dysuria and hematuria.   Musculoskeletal:        Right arm pain and swelling   Skin: Negative for rash.   Neurological: Negative for numbness and headaches.   Psychiatric/Behavioral: Negative for confusion.     Objective:     Vital Signs (Most Recent):  Temp: 96.9 °F (36.1 °C) (11/18/18 1144)  Pulse: 94 (11/18/18 1144)  Resp: 18  (11/18/18 1144)  BP: 120/71 (11/18/18 1144)  SpO2: 98 % (11/18/18 1144) Vital Signs (24h Range):  Temp:  [96.9 °F (36.1 °C)-99.3 °F (37.4 °C)] 96.9 °F (36.1 °C)  Pulse:  [] 94  Resp:  [18-20] 18  SpO2:  [95 %-100 %] 98 %  BP: (105-135)/(65-78) 120/71     Weight: 68.4 kg (150 lb 12.7 oz)  Body mass index is 24.34 kg/m².  No intake or output data in the 24 hours ending 11/18/18 1336   Physical Exam   Constitutional: He is oriented to person, place, and time.   Mild distress   HENT:   Head: Normocephalic and atraumatic.   Eyes: EOM are normal. Pupils are equal, round, and reactive to light.   Neck: Normal range of motion. Neck supple.   Cardiovascular: Normal rate and regular rhythm.   Pulmonary/Chest: Effort normal and breath sounds normal. No respiratory distress.   Abdominal: Soft. Bowel sounds are normal. He exhibits no distension. There is no tenderness.   Musculoskeletal:   Right arm , av graft in upper arm with thrill , improved  redness seen around the upper arm near the shoulder , has  induration , improved  tenderness, no discharge or open lesions,improved  rom of right shoulder   Neurological: He is alert and oriented to person, place, and time. No cranial nerve deficit.   Skin: Skin is warm.   Psychiatric: He has a normal mood and affect.       Significant Labs:   CBC:   Recent Labs   Lab 11/17/18  0614   WBC 8.45   HGB 7.7*   HCT 24.1*        CMP:   Recent Labs   Lab 11/17/18  0614 11/18/18  0532    138   K 5.6* 4.6   CL 94* 95   CO2 29 27   * 90   BUN 63* 42*   CREATININE 14.6* 10.3*   CALCIUM 9.4 9.6   ANIONGAP 13 16   EGFRNONAA 3* 5*       Significant Imaging: I have reviewed all pertinent imaging results/findings within the past 24 hours.    Assessment/Plan:      * Mechanical complication of arteriovenous fistula surgically created      Discussed with dr coombs on Friday   Us non vascular shows hematoma /abscess  Patient feels better with improved arm motion  Blood cx likely  contaminant  Will discuss with dr coombs in am  continue vanc and gentamycin with hd , check levels in am before hd  Prn pain meds         Bacteremia      Blood cx grew coagulase negative staph in 1 bottle, likely contaminant.  Continue abx for now     Hyperkalemia    Mild hyperkalemia,  S/p 1 dose of 50 and iv regular insulin on 11/16, monitor with  am labs  Secondary to esrd  S/p hd on 11/17   improved         Seizure disorder    Continue home meds       Anemia in ESRD (end-stage renal disease)    No active bleeding , monitor  On procrit with hd  Will monitor labs, may need blood with next hd     ESRD (end stage renal disease) on dialysis    Patient missed hd 11/16  S/p hd 11/17  Nephrology following, plan for hd in am       Essential hypertension    Continue norvasc and monitor bp  stable       VTE Risk Mitigation (From admission, onward)        Ordered     heparin (porcine) injection 5,000 Units  As needed (PRN)      11/17/18 1111     IP VTE HIGH RISK PATIENT  Once      11/16/18 1644     Place sequential compression device  Until discontinued      11/16/18 1644              Audrey Rider MD  Department of Hospital Medicine   [facility]

## 2018-11-18 NOTE — PLAN OF CARE
See DC assessment completed by FREDDIE Membreno on 11/17/2018.   Pt will need medicaid transportation or bus token at time of discharge.       11/18/18 0917   Discharge Assessment   Assessment Type Discharge Planning Assessment   Confirmed/corrected address and phone number on facesheet? Yes   Assessment information obtained from? Patient     11/17/18 1320    Discharge Assessment   Assessment Type Discharge Planning Assessment   Confirmed/corrected address and phone number on facesheet? Yes   Assessment information obtained from? Patient    Prior to hospitilization cognitive status: Alert/ Oriented    Prior to hospitalization functional status: Independent    Lives With Room mate   Patient's perception of discharge disposition home or selfcare   Patient currently being followed by outpatient case management? No   Dialysis Name and Scheduled days M-W-F dialysis   Discharge Plan A Home

## 2018-11-18 NOTE — ASSESSMENT & PLAN NOTE
Discussed with dr coombs on Friday   Us non vascular shows hematoma /abscess  Patient feels better with improved arm motion  Blood cx likely contaminant  Will discuss with dr coombs in am  continue vanc and gentamycin with hd , check levels in am before hd  Prn pain meds

## 2018-11-18 NOTE — ASSESSMENT & PLAN NOTE
Mild hyperkalemia,  S/p 1 dose of 50 and iv regular insulin on 11/16, monitor with  am labs  Secondary to esrd  S/p hd on 11/17   improved

## 2018-11-18 NOTE — SUBJECTIVE & OBJECTIVE
Interval History: right arm pain better, able to move it better, still c/o swelling, no fever.    Review of Systems   Constitutional: Negative for chills and fever.   HENT: Negative for trouble swallowing.    Respiratory: Negative for cough and shortness of breath.    Cardiovascular: Negative for chest pain and leg swelling.   Gastrointestinal: Negative for abdominal pain, diarrhea and vomiting.   Genitourinary: Negative for dysuria and hematuria.   Musculoskeletal:        Right arm pain and swelling   Skin: Negative for rash.   Neurological: Negative for numbness and headaches.   Psychiatric/Behavioral: Negative for confusion.     Objective:     Vital Signs (Most Recent):  Temp: 96.9 °F (36.1 °C) (11/18/18 1144)  Pulse: 94 (11/18/18 1144)  Resp: 18 (11/18/18 1144)  BP: 120/71 (11/18/18 1144)  SpO2: 98 % (11/18/18 1144) Vital Signs (24h Range):  Temp:  [96.9 °F (36.1 °C)-99.3 °F (37.4 °C)] 96.9 °F (36.1 °C)  Pulse:  [] 94  Resp:  [18-20] 18  SpO2:  [95 %-100 %] 98 %  BP: (105-135)/(65-78) 120/71     Weight: 68.4 kg (150 lb 12.7 oz)  Body mass index is 24.34 kg/m².  No intake or output data in the 24 hours ending 11/18/18 1336   Physical Exam   Constitutional: He is oriented to person, place, and time.   Mild distress   HENT:   Head: Normocephalic and atraumatic.   Eyes: EOM are normal. Pupils are equal, round, and reactive to light.   Neck: Normal range of motion. Neck supple.   Cardiovascular: Normal rate and regular rhythm.   Pulmonary/Chest: Effort normal and breath sounds normal. No respiratory distress.   Abdominal: Soft. Bowel sounds are normal. He exhibits no distension. There is no tenderness.   Musculoskeletal:   Right arm , av graft in upper arm with thrill , improved  redness seen around the upper arm near the shoulder , has  induration , improved  tenderness, no discharge or open lesions,improved  rom of right shoulder   Neurological: He is alert and oriented to person, place, and time. No cranial  nerve deficit.   Skin: Skin is warm.   Psychiatric: He has a normal mood and affect.       Significant Labs:   CBC:   Recent Labs   Lab 11/17/18 0614   WBC 8.45   HGB 7.7*   HCT 24.1*        CMP:   Recent Labs   Lab 11/17/18 0614 11/18/18  0532    138   K 5.6* 4.6   CL 94* 95   CO2 29 27   * 90   BUN 63* 42*   CREATININE 14.6* 10.3*   CALCIUM 9.4 9.6   ANIONGAP 13 16   EGFRNONAA 3* 5*       Significant Imaging: I have reviewed all pertinent imaging results/findings within the past 24 hours.

## 2018-11-19 LAB
ANION GAP SERPL CALC-SCNC: 17 MMOL/L
BACTERIA BLD CULT: NORMAL
BASOPHILS # BLD AUTO: 0.02 K/UL
BASOPHILS NFR BLD: 0.3 %
BUN SERPL-MCNC: 58 MG/DL
CALCIUM SERPL-MCNC: 10 MG/DL
CHLORIDE SERPL-SCNC: 95 MMOL/L
CO2 SERPL-SCNC: 23 MMOL/L
CREAT SERPL-MCNC: 12.7 MG/DL
DIFFERENTIAL METHOD: ABNORMAL
EOSINOPHIL # BLD AUTO: 0.4 K/UL
EOSINOPHIL NFR BLD: 5.3 %
ERYTHROCYTE [DISTWIDTH] IN BLOOD BY AUTOMATED COUNT: 15.1 %
EST. GFR  (AFRICAN AMERICAN): 5 ML/MIN/1.73 M^2
EST. GFR  (NON AFRICAN AMERICAN): 4 ML/MIN/1.73 M^2
GENTAMICIN SERPL-MCNC: 1.1 UG/ML
GENTAMICIN SERPL-MCNC: 4.6 UG/ML
GLUCOSE SERPL-MCNC: 97 MG/DL
HCT VFR BLD AUTO: 24.8 %
HGB BLD-MCNC: 7.8 G/DL
LEVETIRACETAM SERPL-MCNC: 24.5 UG/ML (ref 3–60)
LYMPHOCYTES # BLD AUTO: 2.1 K/UL
LYMPHOCYTES NFR BLD: 29.1 %
MCH RBC QN AUTO: 30 PG
MCHC RBC AUTO-ENTMCNC: 31.5 G/DL
MCV RBC AUTO: 95 FL
MONOCYTES # BLD AUTO: 0.7 K/UL
MONOCYTES NFR BLD: 9.6 %
NEUTROPHILS # BLD AUTO: 4 K/UL
NEUTROPHILS NFR BLD: 55.7 %
PLATELET # BLD AUTO: 259 K/UL
PLATELET BLD QL SMEAR: ABNORMAL
PMV BLD AUTO: ABNORMAL FL
POLYCHROMASIA BLD QL SMEAR: ABNORMAL
POTASSIUM SERPL-SCNC: 4.9 MMOL/L
RBC # BLD AUTO: 2.6 M/UL
SODIUM SERPL-SCNC: 135 MMOL/L
VANCOMYCIN SERPL-MCNC: 26.9 UG/ML
WBC # BLD AUTO: 7.19 K/UL

## 2018-11-19 PROCEDURE — G0378 HOSPITAL OBSERVATION PER HR: HCPCS

## 2018-11-19 PROCEDURE — 99225 PR SUBSEQUENT OBSERVATION CARE,LEVEL II: CPT | Mod: ,,, | Performed by: INTERNAL MEDICINE

## 2018-11-19 PROCEDURE — 63600175 PHARM REV CODE 636 W HCPCS: Performed by: INTERNAL MEDICINE

## 2018-11-19 PROCEDURE — 80202 ASSAY OF VANCOMYCIN: CPT

## 2018-11-19 PROCEDURE — 85025 COMPLETE CBC W/AUTO DIFF WBC: CPT

## 2018-11-19 PROCEDURE — 80100016 HC MAINTENANCE HEMODIALYSIS

## 2018-11-19 PROCEDURE — 11000001 HC ACUTE MED/SURG PRIVATE ROOM

## 2018-11-19 PROCEDURE — 80170 ASSAY OF GENTAMICIN: CPT

## 2018-11-19 PROCEDURE — 25000003 PHARM REV CODE 250: Performed by: INTERNAL MEDICINE

## 2018-11-19 PROCEDURE — 87040 BLOOD CULTURE FOR BACTERIA: CPT

## 2018-11-19 PROCEDURE — 94761 N-INVAS EAR/PLS OXIMETRY MLT: CPT

## 2018-11-19 PROCEDURE — 80048 BASIC METABOLIC PNL TOTAL CA: CPT

## 2018-11-19 PROCEDURE — 36415 COLL VENOUS BLD VENIPUNCTURE: CPT

## 2018-11-19 RX ORDER — OXYCODONE AND ACETAMINOPHEN 10; 325 MG/1; MG/1
1 TABLET ORAL EVERY 4 HOURS PRN
Status: DISCONTINUED | OUTPATIENT
Start: 2018-11-19 | End: 2018-11-23 | Stop reason: CLARIF

## 2018-11-19 RX ORDER — GENTAMICIN SULFATE 100 MG/100ML
100 INJECTION, SOLUTION INTRAVENOUS ONCE
Status: COMPLETED | OUTPATIENT
Start: 2018-11-19 | End: 2018-11-19

## 2018-11-19 RX ORDER — DAPSONE 25 MG/1
100 TABLET ORAL DAILY
Status: DISCONTINUED | OUTPATIENT
Start: 2018-11-19 | End: 2018-11-27 | Stop reason: HOSPADM

## 2018-11-19 RX ADMIN — PANTOPRAZOLE SODIUM 40 MG: 40 TABLET, DELAYED RELEASE ORAL at 08:11

## 2018-11-19 RX ADMIN — ALTEPLASE 2 MG: 2.2 INJECTION, POWDER, LYOPHILIZED, FOR SOLUTION INTRAVENOUS at 11:11

## 2018-11-19 RX ADMIN — SEVELAMER CARBONATE 1600 MG: 800 TABLET, FILM COATED ORAL at 08:11

## 2018-11-19 RX ADMIN — AMLODIPINE BESYLATE 10 MG: 5 TABLET ORAL at 08:11

## 2018-11-19 RX ADMIN — SEVELAMER CARBONATE 1600 MG: 800 TABLET, FILM COATED ORAL at 12:11

## 2018-11-19 RX ADMIN — MORPHINE SULFATE 2 MG: 4 INJECTION, SOLUTION INTRAMUSCULAR; INTRAVENOUS at 01:11

## 2018-11-19 RX ADMIN — HEPARIN SODIUM 5000 UNITS: 5000 INJECTION, SOLUTION INTRAVENOUS; SUBCUTANEOUS at 10:11

## 2018-11-19 RX ADMIN — OXYCODONE HYDROCHLORIDE AND ACETAMINOPHEN 1 TABLET: 7.5; 325 TABLET ORAL at 08:11

## 2018-11-19 RX ADMIN — OXYCODONE HYDROCHLORIDE AND ACETAMINOPHEN 1 TABLET: 7.5; 325 TABLET ORAL at 12:11

## 2018-11-19 RX ADMIN — SEVELAMER CARBONATE 1600 MG: 800 TABLET, FILM COATED ORAL at 06:11

## 2018-11-19 RX ADMIN — GENTAMICIN SULFATE 100 MG: 100 INJECTION, SOLUTION INTRAVENOUS at 06:11

## 2018-11-19 RX ADMIN — OXYCODONE HYDROCHLORIDE AND ACETAMINOPHEN 1 TABLET: 10; 325 TABLET ORAL at 09:11

## 2018-11-19 RX ADMIN — LEVETIRACETAM 500 MG: 500 TABLET, FILM COATED ORAL at 09:11

## 2018-11-19 RX ADMIN — DAPSONE 100 MG: 25 TABLET ORAL at 03:11

## 2018-11-19 RX ADMIN — FLUOXETINE 40 MG: 20 CAPSULE ORAL at 08:11

## 2018-11-19 RX ADMIN — LEVETIRACETAM 500 MG: 500 TABLET, FILM COATED ORAL at 08:11

## 2018-11-19 RX ADMIN — ERYTHROPOIETIN 10000 UNITS: 10000 INJECTION, SOLUTION INTRAVENOUS; SUBCUTANEOUS at 09:11

## 2018-11-19 NOTE — NURSING
Received report from dialysis nurse. She reports dialysis was complete at 1015am. Dr. Rider called to report that she would like Gentamicin lab drawn 2 hours after dialysis. Will place order for lab.

## 2018-11-19 NOTE — PROGRESS NOTES
[facility] Hospital Medicine  Progress Note    Patient Name: Greyson Hudson III  MRN: [id number]  Patient Class: OP- Observation   Admission Date: 11/16/2018  Length of Stay: 0 days  Attending Physician: Audrey Rider MD  Primary Care Provider: James Torres MD        Subjective:     Principal Problem:Mechanical complication of arteriovenous fistula surgically created    HPI:  52 year old male with past medical history of htn, esrd on hd m/w/f, seizure disorder had av graft placement in right arm about 2 weeks ago.He came in c/o pain in right arm , more worse today.He went to HD today but did not do it as he was hurting in his arm.He denies fever, chills, nausea, vomiting.Says cannot move his arm much.He did not fever or wbc in er.Was given a dose of vanc and gentamycin.Patient had a left side tunneled catheter for hd.He aster said he ran out of his pain meds.    Hospital Course:  Still c/o pain and swelling in right arm, no fever, cx negative so far.Got HD on 11/17/18.US non vascular showed 11 cm hypoechoic avascular lesion in right axillary region that could reflect abscess, hematoma, soft tissue mass.Patients pain was  better and able to move arm better on 11/18/18.Last evening , he developed more swelling of the arm and worsened pain from earlier.He had doppler radial  pulse and was given iv dilaudid for pain.    Interval History: worsened right arm pain and swelling overnight, no fever, seen in hd.  Review of Systems   Constitutional: Negative for chills and fever.   HENT: Negative for trouble swallowing.    Respiratory: Negative for cough and shortness of breath.    Cardiovascular: Negative for chest pain and leg swelling.   Gastrointestinal: Negative for abdominal pain, diarrhea and vomiting.   Genitourinary: Negative for dysuria and hematuria.   Musculoskeletal:        Right arm pain and swelling   Skin: Negative for rash.   Neurological: Negative for numbness and headaches.    Psychiatric/Behavioral: Negative for confusion.     Objective:     Vital Signs (Most Recent):  Temp: 97.9 °F (36.6 °C) (11/19/18 0750)  Pulse: 102 (11/19/18 0945)  Resp: 20 (11/19/18 0750)  BP: 97/67 (11/19/18 0945)  SpO2: 98 % (11/19/18 0728) Vital Signs (24h Range):  Temp:  [96.9 °F (36.1 °C)-98.8 °F (37.1 °C)] 97.9 °F (36.6 °C)  Pulse:  [] 102  Resp:  [18-20] 20  SpO2:  [94 %-100 %] 98 %  BP: ()/(67-99) 97/67     Weight: 68.4 kg (150 lb 12.7 oz)  Body mass index is 24.34 kg/m².  No intake or output data in the 24 hours ending 11/19/18 0951   Physical Exam   Constitutional: He is oriented to person, place, and time.   Mild distress   HENT:   Head: Normocephalic and atraumatic.   Eyes: EOM are normal. Pupils are equal, round, and reactive to light.   Neck: Normal range of motion. Neck supple.   Cardiovascular: Normal rate and regular rhythm.   Pulmonary/Chest: Effort normal and breath sounds normal. No respiratory distress.   Abdominal: Soft. Bowel sounds are normal. He exhibits no distension. There is no tenderness.   Musculoskeletal:   Right arm , av graft in upper arm with thrill , improved  redness seen around the upper arm near the shoulder , has  induration , no discharge or open lesions,worsened swelling in right forearm   Neurological: He is alert and oriented to person, place, and time. No cranial nerve deficit.   Skin: Skin is warm.   Psychiatric: He has a normal mood and affect.       Significant Labs:   CBC:   Recent Labs   Lab 11/19/18  0540   WBC 7.19   HGB 7.8*   HCT 24.8*        CMP:   Recent Labs   Lab 11/18/18  0532 11/19/18  0540    135*   K 4.6 4.9   CL 95 95   CO2 27 23   GLU 90 97   BUN 42* 58*   CREATININE 10.3* 12.7*   CALCIUM 9.6 10.0   ANIONGAP 16 17*   EGFRNONAA 5* 4*       Significant Imaging: I have reviewed all pertinent imaging results/findings within the past 24 hours.    Assessment/Plan:      * Mechanical complication of arteriovenous fistula surgically  created      Discussed with dr coombs on Friday   Us non vascular shows hematoma /abscess  Patient feels better with improved arm motion  Blood cx likely contaminant  Will discuss with dr coombs in am  On vanc and gentamycin with hd ,   vanc levels therapeutic today , will hold vanc dose today  Follow gent level 2 hrs after hd  Prn pain meds  Talked to Dr coombs today and will follow up          Bacteremia      Blood cx grew coagulase negative staph in 1 bottle, likely contaminant.  Continue abx for now     Hyperkalemia    Mild hyperkalemia,  S/p 1 dose of 50 and iv regular insulin on 11/16, monitor with  am labs  Secondary to esrd  S/p hd on 11/17   improved         Seizure disorder    Continue home meds       Anemia in ESRD (end-stage renal disease)    No active bleeding , monitor  On procrit with hd       ESRD (end stage renal disease) on dialysis    Patient missed hd 11/16  S/p hd 11/17  Nephrology following, plan for hd in am       HIV disease    Last cd4 count in 01/18 was 77  Patient on prezcobix and tivicay at home  Will try if pharmacy can get prezcobix          Essential hypertension    Continue norvasc and monitor bp  stable       VTE Risk Mitigation (From admission, onward)        Ordered     heparin (porcine) injection 5,000 Units  As needed (PRN)      11/17/18 1111     IP VTE HIGH RISK PATIENT  Once      11/16/18 1644     Place sequential compression device  Until discontinued      11/16/18 1644              Audrey Rider MD  Department of Hospital Medicine   [facility]

## 2018-11-19 NOTE — NURSING
Pt came out of room to nurses station and talked to Ronna OCHOA. Requested to speak to doctor regarding the swelling in his arm. I went and spoke with Pt. His arm is more edematous down to his wrist and hand. Unable to feel radial pulse but his extremity is warm to touch. Pt states he has lost feeling in his arm. Call placed to NP. Called Charge nurse in ED for second evaluation with doppler while awaiting callback from NP.

## 2018-11-19 NOTE — NURSING
Per NPKylah, ordered STAT venous doppler on R arm to r/o blood clot. Called radiology to report order. Also ordered dilaudid 1x dose. Gave medication. Awaiting doppler.

## 2018-11-19 NOTE — NURSING
Called radiology to inquire about venous US study. Spoke with Bhavana. She will notify tech that I am still waiting.

## 2018-11-19 NOTE — ASSESSMENT & PLAN NOTE
Discussed with dr coombs on Friday   Us non vascular shows hematoma /abscess  Patient feels better with improved arm motion  Blood cx likely contaminant  Will discuss with dr coombs in am  On vanc and gentamycin with hd ,   vanc levels therapeutic today , will hold vanc dose today  Follow gent level 2 hrs after hd  Prn pain meds  Talked to Dr coombs today and will follow up

## 2018-11-19 NOTE — SUBJECTIVE & OBJECTIVE
Interval History: worsened right arm pain and swelling overnight, no fever, seen in hd.  Review of Systems   Constitutional: Negative for chills and fever.   HENT: Negative for trouble swallowing.    Respiratory: Negative for cough and shortness of breath.    Cardiovascular: Negative for chest pain and leg swelling.   Gastrointestinal: Negative for abdominal pain, diarrhea and vomiting.   Genitourinary: Negative for dysuria and hematuria.   Musculoskeletal:        Right arm pain and swelling   Skin: Negative for rash.   Neurological: Negative for numbness and headaches.   Psychiatric/Behavioral: Negative for confusion.     Objective:     Vital Signs (Most Recent):  Temp: 97.9 °F (36.6 °C) (11/19/18 0750)  Pulse: 102 (11/19/18 0945)  Resp: 20 (11/19/18 0750)  BP: 97/67 (11/19/18 0945)  SpO2: 98 % (11/19/18 0728) Vital Signs (24h Range):  Temp:  [96.9 °F (36.1 °C)-98.8 °F (37.1 °C)] 97.9 °F (36.6 °C)  Pulse:  [] 102  Resp:  [18-20] 20  SpO2:  [94 %-100 %] 98 %  BP: ()/(67-99) 97/67     Weight: 68.4 kg (150 lb 12.7 oz)  Body mass index is 24.34 kg/m².  No intake or output data in the 24 hours ending 11/19/18 0951   Physical Exam   Constitutional: He is oriented to person, place, and time.   Mild distress   HENT:   Head: Normocephalic and atraumatic.   Eyes: EOM are normal. Pupils are equal, round, and reactive to light.   Neck: Normal range of motion. Neck supple.   Cardiovascular: Normal rate and regular rhythm.   Pulmonary/Chest: Effort normal and breath sounds normal. No respiratory distress.   Abdominal: Soft. Bowel sounds are normal. He exhibits no distension. There is no tenderness.   Musculoskeletal:   Right arm , av graft in upper arm with thrill , improved  redness seen around the upper arm near the shoulder , has  induration , no discharge or open lesions,worsened swelling in right forearm   Neurological: He is alert and oriented to person, place, and time. No cranial nerve deficit.   Skin: Skin  is warm.   Psychiatric: He has a normal mood and affect.       Significant Labs:   CBC:   Recent Labs   Lab 11/19/18  0540   WBC 7.19   HGB 7.8*   HCT 24.8*        CMP:   Recent Labs   Lab 11/18/18  0532 11/19/18  0540    135*   K 4.6 4.9   CL 95 95   CO2 27 23   GLU 90 97   BUN 42* 58*   CREATININE 10.3* 12.7*   CALCIUM 9.6 10.0   ANIONGAP 16 17*   EGFRNONAA 5* 4*       Significant Imaging: I have reviewed all pertinent imaging results/findings within the past 24 hours.

## 2018-11-19 NOTE — PLAN OF CARE
"Received call from RN. Patient in "excruciating pain" and having swelling of the RUE. Extremity warm to touch per RN, but unable to palpate radial pulse. Doppler pulse was obtained in the distal extremity. SAT US of RUE Veins to r/o clot in light of increased swelling. Dilaudid one time dose to address pain. Will monitor for US results.  2:16 AM - US negative. Continue PRN pain mends and monitor.   "

## 2018-11-19 NOTE — PROGRESS NOTES
Progress Note  Nephrology    Admit Date: 11/16/2018   LOS: 0 days     SUBJECTIVE:     Follow-up For:  Dialysis and RUE AVF infection    Review of Systems:  C/o RUE pain and swelling. Current pain medication is helping a lot.  ROS negative for fever, chills, chest pain, SOB, palp. No numbness or tingling    OBJECTIVE:     Vital Signs (Most Recent)  Temp: 97.9 °F (36.6 °C) (11/19/18 0750)  Pulse: 104 (11/19/18 0915)  Resp: 20 (11/19/18 0750)  BP: 111/79 (11/19/18 0915)  SpO2: 98 % (11/19/18 0728)    Vital Signs Range (Last 24H):  Temp:  [96.9 °F (36.1 °C)-98.8 °F (37.1 °C)]   Pulse:  []   Resp:  [18-20]   BP: (101-169)/(71-99)   SpO2:  [94 %-100 %]     I & O (Last 24H):No intake or output data in the 24 hours ending 11/19/18 0939  Physical Exam:  NAD/cachectic  Left IJ permacath  No murmurs or gallops  Lungs clear  Abdomen is soft +BS  Ext: RUE swelling and tenderness on palpation. No thrill    Laboratory:  CBC:  Recent Labs   Lab 11/19/18  0540   WBC 7.19   RBC 2.60*   HGB 7.8*   HCT 24.8*        BMP:  Recent Labs   Lab 11/19/18  0540   *   K 4.9   CL 95   CO2 23   BUN 58*   CREATININE 12.7*   CALCIUM 10.0          ASSESSMENT/PLAN:     Active Hospital Problems    Diagnosis  POA    *Mechanical complication of arteriovenous fistula surgically created [T82.590A]  Yes    Bacteremia [R78.81]  No    Seizure disorder [G40.909]  Yes    Hyperkalemia [E87.5]  Yes    Anemia in ESRD (end-stage renal disease) [N18.6, D63.1]  Yes    ESRD (end stage renal disease) on dialysis [N18.6, Z99.2]  Not Applicable    Essential hypertension [I10]  Yes     Chronic      Resolved Hospital Problems   No resolved problems to display.     1- ESRD on HD with dr. Machuca at [facility] M/W[facility]F    2- infected RUE AVG    3- thrombosis of the RUE AVG    3- ESRD associated Anemia    4- ESRD associated mineral bone disease    5- HIV not on cART    6- RUE pain    -- continue with HD M/W/F    -- see HD prescription: UF 2-3  brian    -- discuss with dr. Apple plan of care. He has currently thrombosed RUPALOMO AVG    -- continue with phos binders    -- blood transfusion if his Hgb < 7.0 mg/dl. No need for iron IV due to possible infection.

## 2018-11-19 NOTE — ASSESSMENT & PLAN NOTE
Last cd4 count in 01/18 was 77  Patient on prezcobix and tivicay at home  Will try if pharmacy can get prezcobix

## 2018-11-20 LAB
BASOPHILS # BLD AUTO: 0.02 K/UL
BASOPHILS NFR BLD: 0.3 %
DIFFERENTIAL METHOD: ABNORMAL
EOSINOPHIL # BLD AUTO: 0.4 K/UL
EOSINOPHIL NFR BLD: 6.1 %
ERYTHROCYTE [DISTWIDTH] IN BLOOD BY AUTOMATED COUNT: 15 %
HCT VFR BLD AUTO: 23.1 %
HGB BLD-MCNC: 7.4 G/DL
LYMPHOCYTES # BLD AUTO: 1.8 K/UL
LYMPHOCYTES NFR BLD: 29.4 %
MCH RBC QN AUTO: 30.5 PG
MCHC RBC AUTO-ENTMCNC: 32 G/DL
MCV RBC AUTO: 95 FL
MONOCYTES # BLD AUTO: 0.7 K/UL
MONOCYTES NFR BLD: 11.6 %
NEUTROPHILS # BLD AUTO: 3.3 K/UL
NEUTROPHILS NFR BLD: 52.4 %
PLATELET # BLD AUTO: 256 K/UL
PMV BLD AUTO: 9.2 FL
RBC # BLD AUTO: 2.43 M/UL
WBC # BLD AUTO: 6.2 K/UL

## 2018-11-20 PROCEDURE — 85025 COMPLETE CBC W/AUTO DIFF WBC: CPT

## 2018-11-20 PROCEDURE — 94761 N-INVAS EAR/PLS OXIMETRY MLT: CPT

## 2018-11-20 PROCEDURE — 25000003 PHARM REV CODE 250: Performed by: INTERNAL MEDICINE

## 2018-11-20 PROCEDURE — 80048 BASIC METABOLIC PNL TOTAL CA: CPT

## 2018-11-20 PROCEDURE — 63600175 PHARM REV CODE 636 W HCPCS: Performed by: INTERNAL MEDICINE

## 2018-11-20 PROCEDURE — 11000001 HC ACUTE MED/SURG PRIVATE ROOM

## 2018-11-20 PROCEDURE — 99900035 HC TECH TIME PER 15 MIN (STAT)

## 2018-11-20 PROCEDURE — G0378 HOSPITAL OBSERVATION PER HR: HCPCS

## 2018-11-20 PROCEDURE — 86920 COMPATIBILITY TEST SPIN: CPT

## 2018-11-20 PROCEDURE — 86901 BLOOD TYPING SEROLOGIC RH(D): CPT

## 2018-11-20 PROCEDURE — 99226 PR SUBSEQUENT OBSERVATION CARE,LEVEL III: CPT | Mod: ,,, | Performed by: INTERNAL MEDICINE

## 2018-11-20 RX ORDER — FLUOXETINE 20 MG/1
20 TABLET ORAL DAILY
Status: ON HOLD | COMMUNITY
End: 2018-11-26 | Stop reason: HOSPADM

## 2018-11-20 RX ORDER — MIRTAZAPINE 15 MG/1
15 TABLET, FILM COATED ORAL NIGHTLY
Status: ON HOLD | COMMUNITY
End: 2021-02-16 | Stop reason: HOSPADM

## 2018-11-20 RX ORDER — LEVETIRACETAM 500 MG/1
500 TABLET ORAL 2 TIMES DAILY
Status: ON HOLD | COMMUNITY
End: 2018-11-26 | Stop reason: HOSPADM

## 2018-11-20 RX ORDER — CLINDAMYCIN PHOSPHATE 10 UG/ML
LOTION TOPICAL DAILY
Status: ON HOLD | COMMUNITY
End: 2018-11-26 | Stop reason: HOSPADM

## 2018-11-20 RX ORDER — DOLUTEGRAVIR SODIUM 50 MG/1
50 TABLET, FILM COATED ORAL DAILY
Status: ON HOLD | COMMUNITY
End: 2021-02-16 | Stop reason: SDUPTHER

## 2018-11-20 RX ORDER — CARVEDILOL 3.12 MG/1
3.12 TABLET ORAL 2 TIMES DAILY WITH MEALS
COMMUNITY

## 2018-11-20 RX ORDER — PRAVASTATIN SODIUM 40 MG/1
40 TABLET ORAL DAILY
Status: ON HOLD | COMMUNITY
End: 2021-02-16 | Stop reason: HOSPADM

## 2018-11-20 RX ORDER — TRIAMCINOLONE ACETONIDE 1 MG/G
CREAM TOPICAL DAILY
Status: ON HOLD | COMMUNITY
End: 2021-02-16 | Stop reason: HOSPADM

## 2018-11-20 RX ADMIN — MORPHINE SULFATE 2 MG: 4 INJECTION, SOLUTION INTRAMUSCULAR; INTRAVENOUS at 03:11

## 2018-11-20 RX ADMIN — LEVETIRACETAM 500 MG: 500 TABLET, FILM COATED ORAL at 09:11

## 2018-11-20 RX ADMIN — FLUOXETINE 40 MG: 20 CAPSULE ORAL at 09:11

## 2018-11-20 RX ADMIN — DOLUTEGRAVIR SODIUM 50 MG: 50 TABLET, FILM COATED ORAL at 04:11

## 2018-11-20 RX ADMIN — PANTOPRAZOLE SODIUM 40 MG: 40 TABLET, DELAYED RELEASE ORAL at 09:11

## 2018-11-20 RX ADMIN — DAPSONE 100 MG: 25 TABLET ORAL at 09:11

## 2018-11-20 RX ADMIN — OXYCODONE HYDROCHLORIDE AND ACETAMINOPHEN 1 TABLET: 10; 325 TABLET ORAL at 05:11

## 2018-11-20 RX ADMIN — SEVELAMER CARBONATE 1600 MG: 800 TABLET, FILM COATED ORAL at 04:11

## 2018-11-20 RX ADMIN — OXYCODONE HYDROCHLORIDE AND ACETAMINOPHEN 1 TABLET: 10; 325 TABLET ORAL at 10:11

## 2018-11-20 RX ADMIN — SEVELAMER CARBONATE 1600 MG: 800 TABLET, FILM COATED ORAL at 01:11

## 2018-11-20 RX ADMIN — SEVELAMER CARBONATE 1600 MG: 800 TABLET, FILM COATED ORAL at 09:11

## 2018-11-20 RX ADMIN — AMLODIPINE BESYLATE 10 MG: 5 TABLET ORAL at 09:11

## 2018-11-20 NOTE — NURSING
Pt's  brought home HIV mediations Prezcobix and Tivicay. Will notify provider to have her add to medication list and bring to pharmacy to have them label medications.

## 2018-11-20 NOTE — ASSESSMENT & PLAN NOTE
Mild hyperkalemia,  S/p 1 dose of 50 and iv regular insulin on 11/16, monitor with  am labs  Secondary to esrd  S/p hd on 11/17   Improved  Check labs in am

## 2018-11-20 NOTE — ASSESSMENT & PLAN NOTE
Last cd4 count in 01/18 was 77  Patient on prezcobix and tivicay at home  Pharmacy was not able to get it  Patients  to bring it   Continue dapsone daily

## 2018-11-20 NOTE — PHARMACY MED REC
"Admission Medication Reconciliation - Pharmacy Consult Note    The home medication history was taken by Beth Donaldson CPhT.  Based on information gathered and subsequent review by the clinical pharmacist, the items below may need attention.     You may go to "Admission" then "Reconcile Home Medications" tabs to review and/or act upon these items.     Potentially problematic discrepancies with current MAR  o Patient IS taking the following which was not ordered upon admit  o Carvedilol 3.125 mg PO BID  o Pravastatin 40 mg PO daily  o Mirtazapine 15 mg PO daily  - Please note: mirtazapine was d/c'd back in November 2017 due to "due to risk of worsening cognition."  Pt has, however, recently received new prescriptions for mirtazapine in both September and October of 2018.  o Patient is taking a drug DIFFERENTLY than how ordered upon admit  o Fluoxetine 60 mg PO daily (combination of fluoxetine 40 mg + 20 mg)  - Inpatient order = fluoxetine 40 mg PO daily    Medications Prior to Admission   Medication Sig Dispense Refill Last Dose    acyclovir (ZOVIRAX) 200 MG capsule Take 4 capsules (800 mg total) by mouth 3 (three) times daily. 360 capsule 11 11/7/2018    amlodipine (NORVASC) 10 MG tablet Take 1 tablet (10 mg total) by mouth once daily. 30 tablet 0 11/7/2018    carvedilol (COREG) 3.125 MG tablet Take 3.125 mg by mouth 2 (two) times daily with meals.       clindamycin (CLEOCIN T) 1 % lotion Apply topically once daily.       dapsone 100 MG Tab Take 100 mg by mouth once daily.       darunavir-cobicistat (PREZCOBIX) 800-150 mg-mg Tab Take 1 tablet by mouth once daily.       dolutegravir (TIVICAY) 50 mg Tab Take 50 mg by mouth once daily.       FLUoxetine (PROZAC) 40 MG capsule Take 40 mg by mouth once daily.   11/7/2018    FLUoxetine 20 MG tablet Take 20 mg by mouth once daily. Take along with 40mg       levETIRAcetam (KEPPRA) 500 MG Tab Take 500 mg by mouth 2 (two) times daily.       mirtazapine (REMERON) " 15 MG tablet Take 15 mg by mouth every evening.       oxyCODONE-acetaminophen (PERCOCET) 7.5-325 mg per tablet Take 1 tablet by mouth every 6 (six) hours as needed for Pain. 28 tablet 0 11/7/2018    patiromer calcium sorbitex (VELTASSA) 8.4 gram PwPk Take 8.4 g by mouth once daily.       pravastatin (PRAVACHOL) 40 MG tablet Take 40 mg by mouth once daily.       sevelamer carbonate (RENVELA) 800 mg Tab Take 2 tablets (1,600 mg total) by mouth 3 (three) times daily with meals. 90 tablet 0 11/7/2018    triamcinolone acetonide 0.1% (KENALOG) 0.1 % cream Apply topically once daily.       HYDROcodone-acetaminophen (NORCO) 7.5-325 mg per tablet Take 1 tablet by mouth every 6 (six) hours as needed for Pain. 30 tablet 0 11/7/2018    pantoprazole (PROTONIX) 40 MG tablet Take 1 tablet (40 mg total) by mouth once daily. 30 tablet 11 11/7/2018       Please address this information as you see fit.  Feel free to contact us if you have any questions or require assistance.    Saran Rowe, Pharm.D., BCPS  [phone]                .  .

## 2018-11-20 NOTE — SUBJECTIVE & OBJECTIVE
Interval History: c/o right arm pain and swelling , some improvement in forearm swelling, fairly controlled with pain meds.  Review of Systems   Constitutional: Negative for chills and fever.   HENT: Negative for trouble swallowing.    Respiratory: Negative for cough and shortness of breath.    Cardiovascular: Negative for chest pain and leg swelling.   Gastrointestinal: Negative for abdominal pain, diarrhea and vomiting.   Genitourinary: Negative for dysuria and hematuria.   Musculoskeletal:        Right arm pain and swelling   Skin: Negative for rash.   Neurological: Negative for numbness and headaches.   Psychiatric/Behavioral: Negative for confusion.     Objective:     Vital Signs (Most Recent):  Temp: 98.7 °F (37.1 °C) (11/20/18 1136)  Pulse: 94 (11/20/18 1200)  Resp: 18 (11/20/18 1136)  BP: 133/77 (11/20/18 1136)  SpO2: 97 % (11/20/18 1136) Vital Signs (24h Range):  Temp:  [98.4 °F (36.9 °C)-99 °F (37.2 °C)] 98.7 °F (37.1 °C)  Pulse:  [] 94  Resp:  [16-18] 18  SpO2:  [97 %-100 %] 97 %  BP: (107-133)/(68-77) 133/77     Weight: 68.4 kg (150 lb 12.7 oz)  Body mass index is 24.34 kg/m².  No intake or output data in the 24 hours ending 11/20/18 1429   Physical Exam   Constitutional: He is oriented to person, place, and time.   Mild distress   HENT:   Head: Normocephalic and atraumatic.   Eyes: EOM are normal. Pupils are equal, round, and reactive to light.   Neck: Normal range of motion. Neck supple.   Cardiovascular: Normal rate and regular rhythm.   Pulmonary/Chest: Effort normal and breath sounds normal. No respiratory distress.   Abdominal: Soft. Bowel sounds are normal. He exhibits no distension. There is no tenderness.   Musculoskeletal:   Right arm , av graft in upper arm with thrill , improved  redness seen around the upper arm near the shoulder , improved   induration , pulsating mass in right axilla, no discharge or open lesions,improved forearm swelling   Neurological: He is alert and oriented to  person, place, and time. No cranial nerve deficit.   Skin: Skin is warm.   Psychiatric:   Anxious        Significant Labs:   CBC:   Recent Labs   Lab 11/19/18  0540   WBC 7.19   HGB 7.8*   HCT 24.8*        CMP:   Recent Labs   Lab 11/19/18  0540   *   K 4.9   CL 95   CO2 23   GLU 97   BUN 58*   CREATININE 12.7*   CALCIUM 10.0   ANIONGAP 17*   EGFRNONAA 4*       Significant Imaging: I have reviewed all pertinent imaging results/findings within the past 24 hours.

## 2018-11-20 NOTE — NURSING
I spoke with pt's , Garrison who reports he will bring pt's antiviral medications. I notified that we have Daspone but we do not have the other 2 medications.

## 2018-11-20 NOTE — PLAN OF CARE
Problem: Patient Care Overview  Goal: Plan of Care Review  Outcome: Ongoing (interventions implemented as appropriate)  Plan of care reviewed with pt. AAOx4, NAD. VS reviewed and remain wnl. Pt reports pain that comes and goes to left arm. Pt reports that today, pain is worse near right axilla. Swelling noted to right upper extremity. Pain has been controlled with PO Percocet. Pt's  brought HIV medications from home. Medications placed in pt specific bin. I spoke with Dr. Rollins regarding dialysis and he reports that dialysis will resume tomorrow. Pt had another US today and tolerated procedure well. Awaiting follow up from Dr. Apple. Again advising pt to rest and elevate RUE. Pt still walks up in the down the hallway frequently. Safety measures implemented. Will continue to monitor pt.

## 2018-11-20 NOTE — ASSESSMENT & PLAN NOTE
Blood cx grew coagulase negative staph in 1 bottle, likely contaminant.  Repeat blood cx negative

## 2018-11-20 NOTE — PROGRESS NOTES
avss  Edema Rt arm, s/p AVG/RUE. Previous RUE AVG  neurovas intact  Fullness axilla, ?pulsatile    IMP  S/p avg/RUE,edema post 2nd avf expected to some degree  No purulence, wounds healing  Wbc--wnl    Plan  Arterial U/S axilla, R/O pseudoaneurysm

## 2018-11-20 NOTE — ASSESSMENT & PLAN NOTE
Us non vascular shows hematoma /abscess  Blood cx likely contaminant, repeat blood cx negative   On vanc and gentamycin with hd , s/p iv gent 11/19 as post hd  level was on lower side   Less likely infection  Will hold off abx for now  Us today for?  pseudoaneurysm right axilla

## 2018-11-20 NOTE — PROGRESS NOTES
[facility] Hospital Medicine  Progress Note    Patient Name: Greyson Hudson III  MRN: [id number]  Patient Class: OP- Observation   Admission Date: 11/16/2018  Length of Stay: 0 days  Attending Physician: Audrey Rider MD  Primary Care Provider: James Torres MD        Subjective:     Principal Problem:Mechanical complication of arteriovenous fistula surgically created    HPI:  52 year old male with past medical history of htn, esrd on hd m/w/f, seizure disorder had av graft placement in right arm about 2 weeks ago.He came in c/o pain in right arm , more worse today.He went to HD today but did not do it as he was hurting in his arm.He denies fever, chills, nausea, vomiting.Says cannot move his arm much.He did not fever or wbc in er.Was given a dose of vanc and gentamycin.Patient had a left side tunneled catheter for hd.He aster said he ran out of his pain meds.    Hospital Course:  Still c/o pain and swelling in right arm, no fever, cx negative so far.Got HD on 11/17/18.US non vascular showed 11 cm hypoechoic avascular lesion in right axillary region that could reflect abscess, hematoma, soft tissue mass.Patients pain was  better and able to move arm better on 11/18/18.Last evening , he developed more swelling of the arm and worsened pain from earlier.He had doppler radial  pulse and was given iv dilaudid for pain.Discussed with Dr Apple 11/19 , he felt a pulsating mass in right upper axilla, and patient will need us to r/o pseudoaneurysm.Forearm less swollen compared to yesterday.    Interval History: c/o right arm pain and swelling , some improvement in forearm swelling, fairly controlled with pain meds.  Review of Systems   Constitutional: Negative for chills and fever.   HENT: Negative for trouble swallowing.    Respiratory: Negative for cough and shortness of breath.    Cardiovascular: Negative for chest pain and leg swelling.   Gastrointestinal: Negative for abdominal pain, diarrhea and  vomiting.   Genitourinary: Negative for dysuria and hematuria.   Musculoskeletal:        Right arm pain and swelling   Skin: Negative for rash.   Neurological: Negative for numbness and headaches.   Psychiatric/Behavioral: Negative for confusion.     Objective:     Vital Signs (Most Recent):  Temp: 98.7 °F (37.1 °C) (11/20/18 1136)  Pulse: 94 (11/20/18 1200)  Resp: 18 (11/20/18 1136)  BP: 133/77 (11/20/18 1136)  SpO2: 97 % (11/20/18 1136) Vital Signs (24h Range):  Temp:  [98.4 °F (36.9 °C)-99 °F (37.2 °C)] 98.7 °F (37.1 °C)  Pulse:  [] 94  Resp:  [16-18] 18  SpO2:  [97 %-100 %] 97 %  BP: (107-133)/(68-77) 133/77     Weight: 68.4 kg (150 lb 12.7 oz)  Body mass index is 24.34 kg/m².  No intake or output data in the 24 hours ending 11/20/18 1429   Physical Exam   Constitutional: He is oriented to person, place, and time.   Mild distress   HENT:   Head: Normocephalic and atraumatic.   Eyes: EOM are normal. Pupils are equal, round, and reactive to light.   Neck: Normal range of motion. Neck supple.   Cardiovascular: Normal rate and regular rhythm.   Pulmonary/Chest: Effort normal and breath sounds normal. No respiratory distress.   Abdominal: Soft. Bowel sounds are normal. He exhibits no distension. There is no tenderness.   Musculoskeletal:   Right arm , av graft in upper arm with thrill , improved  redness seen around the upper arm near the shoulder , improved   induration , pulsating mass in right axilla, no discharge or open lesions,improved forearm swelling   Neurological: He is alert and oriented to person, place, and time. No cranial nerve deficit.   Skin: Skin is warm.   Psychiatric:   Anxious        Significant Labs:   CBC:   Recent Labs   Lab 11/19/18  0540   WBC 7.19   HGB 7.8*   HCT 24.8*        CMP:   Recent Labs   Lab 11/19/18  0540   *   K 4.9   CL 95   CO2 23   GLU 97   BUN 58*   CREATININE 12.7*   CALCIUM 10.0   ANIONGAP 17*   EGFRNONAA 4*       Significant Imaging: I have reviewed all  pertinent imaging results/findings within the past 24 hours.    Assessment/Plan:      * Mechanical complication of arteriovenous fistula surgically created      Us non vascular shows hematoma /abscess  Blood cx likely contaminant, repeat blood cx negative   On vanc and gentamycin with hd , s/p iv gent 11/19 as post hd  level was on lower side   Less likely infection  Will hold off abx for now  Us today for?  pseudoaneurysm right axilla         Bacteremia      Blood cx grew coagulase negative staph in 1 bottle, likely contaminant.  Repeat blood cx negative     Hyperkalemia    Mild hyperkalemia,  S/p 1 dose of 50 and iv regular insulin on 11/16, monitor with  am labs  Secondary to esrd  S/p hd on 11/17   Improved  Check labs in am         Seizure disorder    Continue home meds of keppra       Anemia in ESRD (end-stage renal disease)    No active bleeding , monitor  On procrit with hd       ESRD (end stage renal disease) on dialysis    Patient missed hd 11/16  S/p hd 11/17, 11/19   Nephrology following, plan for hd in am       HIV disease    Last cd4 count in 01/18 was 77  Patient on prezcobix and tivicay at home  Pharmacy was not able to get it  Patients  to bring it   Continue dapsone daily         Essential hypertension    Continue norvasc and monitor bp  stable       VTE Risk Mitigation (From admission, onward)        Ordered     heparin (porcine) injection 5,000 Units  As needed (PRN)      11/17/18 1111     IP VTE HIGH RISK PATIENT  Once      11/16/18 1644     Place sequential compression device  Until discontinued      11/16/18 1644              Audrey Rider MD  Department of Hospital Medicine   [facility]

## 2018-11-20 NOTE — NURSING
Received med list from pt's pharmacy, [facility] in [place]. List was given to provider. New order was received for daspone. Given to pt as ordered. Pt has 2 other antiviral medications that our pharmacy was unable to fill or provide. Pt reports he will try to have someone bring them from home tomorrow.

## 2018-11-20 NOTE — PLAN OF CARE
Problem: Patient Care Overview  Goal: Plan of Care Review  Outcome: Ongoing (interventions implemented as appropriate)  Pt's V/S were monitored throughout the shift. The pt ambulated to the restroom and around the zimmerman independently. The pt remained free from falls and trauma. The pt's pain was managed with PO and IV pain medications. The pt's arm remains swollen. The pt's bed is in the lowest position with the bed wheels locked. Call within reach. NAD noted.

## 2018-11-21 ENCOUNTER — ANESTHESIA EVENT (OUTPATIENT)
Dept: SURGERY | Facility: OTHER | Age: 53
DRG: 252 | End: 2018-11-21
Payer: MEDICAID

## 2018-11-21 ENCOUNTER — ANESTHESIA (OUTPATIENT)
Dept: SURGERY | Facility: OTHER | Age: 53
DRG: 252 | End: 2018-11-21
Payer: MEDICAID

## 2018-11-21 PROBLEM — D62 ACUTE BLOOD LOSS ANEMIA: Status: ACTIVE | Noted: 2018-11-21

## 2018-11-21 PROBLEM — L03.113 CELLULITIS OF ARM, RIGHT: Status: ACTIVE | Noted: 2018-11-21

## 2018-11-21 LAB
ABO + RH BLD: NORMAL
ANION GAP SERPL CALC-SCNC: 16 MMOL/L
ANISOCYTOSIS BLD QL SMEAR: SLIGHT
BACTERIA BLD CULT: NORMAL
BASOPHILS # BLD AUTO: ABNORMAL K/UL
BASOPHILS NFR BLD: 0 %
BLD GP AB SCN CELLS X3 SERPL QL: NORMAL
BLD PROD TYP BPU: NORMAL
BLOOD UNIT EXPIRATION DATE: NORMAL
BLOOD UNIT TYPE CODE: 5100
BLOOD UNIT TYPE: NORMAL
BUN SERPL-MCNC: 60 MG/DL
CALCIUM SERPL-MCNC: 9.3 MG/DL
CHLORIDE SERPL-SCNC: 93 MMOL/L
CHLORIDE SERPL-SCNC: 97 MMOL/L
CHLORIDE SERPL-SCNC: 97 MMOL/L
CO2 SERPL-SCNC: 19 MMOL/L
CO2 SERPL-SCNC: 19 MMOL/L
CO2 SERPL-SCNC: 24 MMOL/L
CODING SYSTEM: NORMAL
CREAT SERPL-MCNC: 12.6 MG/DL
CREAT SERPL-MCNC: 12.6 MG/DL
CREAT SERPL-MCNC: 13.7 MG/DL
DIFFERENTIAL METHOD: ABNORMAL
DISPENSE STATUS: NORMAL
EOSINOPHIL # BLD AUTO: ABNORMAL K/UL
EOSINOPHIL NFR BLD: 1 %
ERYTHROCYTE [DISTWIDTH] IN BLOOD BY AUTOMATED COUNT: 15.2 %
EST. GFR  (AFRICAN AMERICAN): 4 ML/MIN/1.73 M^2
EST. GFR  (AFRICAN AMERICAN): 5 ML/MIN/1.73 M^2
EST. GFR  (AFRICAN AMERICAN): 5 ML/MIN/1.73 M^2
EST. GFR  (NON AFRICAN AMERICAN): 4 ML/MIN/1.73 M^2
GENTAMICIN SERPL-MCNC: 1.2 UG/ML
GLUCOSE SERPL-MCNC: 129 MG/DL
GLUCOSE SERPL-MCNC: 91 MG/DL
GLUCOSE SERPL-MCNC: 91 MG/DL
HCT VFR BLD AUTO: 35.1 %
HGB BLD-MCNC: 11.2 G/DL
LYMPHOCYTES # BLD AUTO: ABNORMAL K/UL
LYMPHOCYTES NFR BLD: 19 %
MCH RBC QN AUTO: 28.6 PG
MCHC RBC AUTO-ENTMCNC: 31.9 G/DL
MCV RBC AUTO: 90 FL
MONOCYTES # BLD AUTO: ABNORMAL K/UL
MONOCYTES NFR BLD: 10 %
NEUTROPHILS # BLD AUTO: ABNORMAL K/UL
NEUTROPHILS NFR BLD: 68 %
NEUTS BAND NFR BLD MANUAL: 2 %
PLATELET # BLD AUTO: 168 K/UL
PLATELET BLD QL SMEAR: ABNORMAL
PMV BLD AUTO: 10 FL
POCT GLUCOSE: 85 MG/DL (ref 70–110)
POCT GLUCOSE: 95 MG/DL (ref 70–110)
POLYCHROMASIA BLD QL SMEAR: ABNORMAL
POTASSIUM SERPL-SCNC: 5 MMOL/L
POTASSIUM SERPL-SCNC: 5.6 MMOL/L
POTASSIUM SERPL-SCNC: 5.6 MMOL/L
RBC # BLD AUTO: 3.92 M/UL
SODIUM SERPL-SCNC: 132 MMOL/L
SODIUM SERPL-SCNC: 132 MMOL/L
SODIUM SERPL-SCNC: 133 MMOL/L
TRANS ERYTHROCYTES VOL PATIENT: NORMAL ML
VANCOMYCIN SERPL-MCNC: 13.8 UG/ML
WBC # BLD AUTO: 7.34 K/UL

## 2018-11-21 PROCEDURE — C1729 CATH, DRAINAGE: HCPCS | Performed by: SPECIALIST

## 2018-11-21 PROCEDURE — 36000707: Performed by: SPECIALIST

## 2018-11-21 PROCEDURE — 63600175 PHARM REV CODE 636 W HCPCS: Performed by: SPECIALIST

## 2018-11-21 PROCEDURE — 25000003 PHARM REV CODE 250: Performed by: INTERNAL MEDICINE

## 2018-11-21 PROCEDURE — 80170 ASSAY OF GENTAMICIN: CPT

## 2018-11-21 PROCEDURE — 63600175 PHARM REV CODE 636 W HCPCS: Performed by: NURSE ANESTHETIST, CERTIFIED REGISTERED

## 2018-11-21 PROCEDURE — 36415 COLL VENOUS BLD VENIPUNCTURE: CPT

## 2018-11-21 PROCEDURE — 85007 BL SMEAR W/DIFF WBC COUNT: CPT

## 2018-11-21 PROCEDURE — 80048 BASIC METABOLIC PNL TOTAL CA: CPT

## 2018-11-21 PROCEDURE — 94761 N-INVAS EAR/PLS OXIMETRY MLT: CPT

## 2018-11-21 PROCEDURE — 20000000 HC ICU ROOM

## 2018-11-21 PROCEDURE — 25000003 PHARM REV CODE 250: Performed by: NURSE ANESTHETIST, CERTIFIED REGISTERED

## 2018-11-21 PROCEDURE — 27000221 HC OXYGEN, UP TO 24 HOURS

## 2018-11-21 PROCEDURE — 63600175 PHARM REV CODE 636 W HCPCS: Performed by: INTERNAL MEDICINE

## 2018-11-21 PROCEDURE — 36000706: Performed by: SPECIALIST

## 2018-11-21 PROCEDURE — 37000008 HC ANESTHESIA 1ST 15 MINUTES: Performed by: SPECIALIST

## 2018-11-21 PROCEDURE — 27201423 OPTIME MED/SURG SUP & DEVICES STERILE SUPPLY: Performed by: SPECIALIST

## 2018-11-21 PROCEDURE — C1768 GRAFT, VASCULAR: HCPCS | Performed by: SPECIALIST

## 2018-11-21 PROCEDURE — 03R70JZ REPLACEMENT OF RIGHT BRACHIAL ARTERY WITH SYNTHETIC SUBSTITUTE, OPEN APPROACH: ICD-10-PCS | Performed by: SPECIALIST

## 2018-11-21 PROCEDURE — 05L90ZZ OCCLUSION OF RIGHT BRACHIAL VEIN, OPEN APPROACH: ICD-10-PCS | Performed by: SPECIALIST

## 2018-11-21 PROCEDURE — C1757 CATH, THROMBECTOMY/EMBOLECT: HCPCS | Performed by: SPECIALIST

## 2018-11-21 PROCEDURE — P9021 RED BLOOD CELLS UNIT: HCPCS

## 2018-11-21 PROCEDURE — 99233 SBSQ HOSP IP/OBS HIGH 50: CPT | Mod: ,,, | Performed by: INTERNAL MEDICINE

## 2018-11-21 PROCEDURE — 80100016 HC MAINTENANCE HEMODIALYSIS

## 2018-11-21 PROCEDURE — 85027 COMPLETE CBC AUTOMATED: CPT

## 2018-11-21 PROCEDURE — 80202 ASSAY OF VANCOMYCIN: CPT

## 2018-11-21 PROCEDURE — 37000009 HC ANESTHESIA EA ADD 15 MINS: Performed by: SPECIALIST

## 2018-11-21 PROCEDURE — P9045 ALBUMIN (HUMAN), 5%, 250 ML: HCPCS | Mod: JG | Performed by: NURSE ANESTHETIST, CERTIFIED REGISTERED

## 2018-11-21 DEVICE — GRAFT PROPATEN 6 X 8 THIN WALL: Type: IMPLANTABLE DEVICE | Site: ARM | Status: FUNCTIONAL

## 2018-11-21 RX ORDER — FENTANYL CITRATE 50 UG/ML
INJECTION, SOLUTION INTRAMUSCULAR; INTRAVENOUS
Status: DISCONTINUED | OUTPATIENT
Start: 2018-11-21 | End: 2018-11-21

## 2018-11-21 RX ORDER — VANCOMYCIN HCL IN 5 % DEXTROSE 1G/250ML
1000 PLASTIC BAG, INJECTION (ML) INTRAVENOUS
Status: COMPLETED | OUTPATIENT
Start: 2018-11-21 | End: 2018-11-21

## 2018-11-21 RX ORDER — ONDANSETRON 2 MG/ML
4 INJECTION INTRAMUSCULAR; INTRAVENOUS DAILY PRN
Status: DISCONTINUED | OUTPATIENT
Start: 2018-11-21 | End: 2018-11-23

## 2018-11-21 RX ORDER — MIDAZOLAM HYDROCHLORIDE 1 MG/ML
INJECTION INTRAMUSCULAR; INTRAVENOUS
Status: DISCONTINUED | OUTPATIENT
Start: 2018-11-21 | End: 2018-11-21

## 2018-11-21 RX ORDER — HEPARIN SODIUM 1000 [USP'U]/ML
INJECTION, SOLUTION INTRAVENOUS; SUBCUTANEOUS
Status: DISCONTINUED | OUTPATIENT
Start: 2018-11-21 | End: 2018-11-21 | Stop reason: HOSPADM

## 2018-11-21 RX ORDER — ETOMIDATE 2 MG/ML
INJECTION INTRAVENOUS
Status: DISCONTINUED | OUTPATIENT
Start: 2018-11-21 | End: 2018-11-21

## 2018-11-21 RX ORDER — SUCCINYLCHOLINE CHLORIDE 20 MG/ML
INJECTION INTRAMUSCULAR; INTRAVENOUS
Status: DISCONTINUED | OUTPATIENT
Start: 2018-11-21 | End: 2018-11-21

## 2018-11-21 RX ORDER — HYDROCODONE BITARTRATE AND ACETAMINOPHEN 500; 5 MG/1; MG/1
TABLET ORAL
Status: DISCONTINUED | OUTPATIENT
Start: 2018-11-21 | End: 2018-11-27 | Stop reason: HOSPADM

## 2018-11-21 RX ORDER — MEPERIDINE HYDROCHLORIDE 50 MG/ML
12.5 INJECTION INTRAMUSCULAR; INTRAVENOUS; SUBCUTANEOUS ONCE AS NEEDED
Status: ACTIVE | OUTPATIENT
Start: 2018-11-21 | End: 2018-11-21

## 2018-11-21 RX ORDER — SODIUM CHLORIDE 0.9 % (FLUSH) 0.9 %
3 SYRINGE (ML) INJECTION
Status: DISCONTINUED | OUTPATIENT
Start: 2018-11-21 | End: 2018-11-27 | Stop reason: HOSPADM

## 2018-11-21 RX ORDER — FENTANYL CITRATE 50 UG/ML
25 INJECTION, SOLUTION INTRAMUSCULAR; INTRAVENOUS EVERY 5 MIN PRN
Status: DISCONTINUED | OUTPATIENT
Start: 2018-11-21 | End: 2018-11-23

## 2018-11-21 RX ORDER — PHENYLEPHRINE HYDROCHLORIDE 10 MG/ML
INJECTION INTRAVENOUS
Status: DISCONTINUED | OUTPATIENT
Start: 2018-11-21 | End: 2018-11-21

## 2018-11-21 RX ORDER — OXYCODONE HYDROCHLORIDE 5 MG/1
5 TABLET ORAL
Status: DISCONTINUED | OUTPATIENT
Start: 2018-11-21 | End: 2018-11-23

## 2018-11-21 RX ORDER — CISATRACURIUM BESYLATE 2 MG/ML
INJECTION, SOLUTION INTRAVENOUS
Status: DISCONTINUED | OUTPATIENT
Start: 2018-11-21 | End: 2018-11-21

## 2018-11-21 RX ORDER — ALBUMIN HUMAN 50 G/1000ML
SOLUTION INTRAVENOUS CONTINUOUS PRN
Status: DISCONTINUED | OUTPATIENT
Start: 2018-11-21 | End: 2018-11-21

## 2018-11-21 RX ORDER — ONDANSETRON 2 MG/ML
INJECTION INTRAMUSCULAR; INTRAVENOUS
Status: DISCONTINUED | OUTPATIENT
Start: 2018-11-21 | End: 2018-11-21

## 2018-11-21 RX ADMIN — ALBUMIN (HUMAN): 2.5 SOLUTION INTRAVENOUS at 12:11

## 2018-11-21 RX ADMIN — ERYTHROPOIETIN 10000 UNITS: 10000 INJECTION, SOLUTION INTRAVENOUS; SUBCUTANEOUS at 02:11

## 2018-11-21 RX ADMIN — VASOPRESSIN 3 UNITS: 20 INJECTION, SOLUTION INTRAMUSCULAR; SUBCUTANEOUS at 01:11

## 2018-11-21 RX ADMIN — FENTANYL CITRATE 50 MCG: 50 INJECTION, SOLUTION INTRAMUSCULAR; INTRAVENOUS at 12:11

## 2018-11-21 RX ADMIN — MIDAZOLAM HYDROCHLORIDE 2 MG: 1 INJECTION, SOLUTION INTRAMUSCULAR; INTRAVENOUS at 12:11

## 2018-11-21 RX ADMIN — LEVETIRACETAM 500 MG: 500 TABLET, FILM COATED ORAL at 08:11

## 2018-11-21 RX ADMIN — DAPSONE 100 MG: 25 TABLET ORAL at 09:11

## 2018-11-21 RX ADMIN — GENTAMICIN SULFATE 100.8 MG: 40 INJECTION, SOLUTION INTRAMUSCULAR; INTRAVENOUS at 08:11

## 2018-11-21 RX ADMIN — CISATRACURIUM BESYLATE 10 MG: 2 INJECTION INTRAVENOUS at 12:11

## 2018-11-21 RX ADMIN — FLUOXETINE 40 MG: 20 CAPSULE ORAL at 08:11

## 2018-11-21 RX ADMIN — CALCIUM CHLORIDE 500 MG: 100 INJECTION, SOLUTION INTRAVENOUS at 01:11

## 2018-11-21 RX ADMIN — OXYCODONE HYDROCHLORIDE AND ACETAMINOPHEN 1 TABLET: 10; 325 TABLET ORAL at 04:11

## 2018-11-21 RX ADMIN — HEPARIN SODIUM 5000 UNITS: 5000 INJECTION, SOLUTION INTRAVENOUS; SUBCUTANEOUS at 04:11

## 2018-11-21 RX ADMIN — PANTOPRAZOLE SODIUM 40 MG: 40 TABLET, DELAYED RELEASE ORAL at 08:11

## 2018-11-21 RX ADMIN — VASOPRESSIN 2 UNITS: 20 INJECTION, SOLUTION INTRAMUSCULAR; SUBCUTANEOUS at 01:11

## 2018-11-21 RX ADMIN — SEVELAMER CARBONATE 1600 MG: 800 TABLET, FILM COATED ORAL at 04:11

## 2018-11-21 RX ADMIN — PHENYLEPHRINE HYDROCHLORIDE 200 MCG: 10 INJECTION INTRAVENOUS at 01:11

## 2018-11-21 RX ADMIN — SODIUM CHLORIDE: 0.9 INJECTION, SOLUTION INTRAVENOUS at 12:11

## 2018-11-21 RX ADMIN — CALCIUM CHLORIDE 500 MG: 100 INJECTION, SOLUTION INTRAVENOUS at 02:11

## 2018-11-21 RX ADMIN — SEVELAMER CARBONATE 1600 MG: 800 TABLET, FILM COATED ORAL at 08:11

## 2018-11-21 RX ADMIN — CISATRACURIUM BESYLATE 6 MG: 2 INJECTION INTRAVENOUS at 01:11

## 2018-11-21 RX ADMIN — ALTEPLASE 4 MG: 2.2 INJECTION, POWDER, LYOPHILIZED, FOR SOLUTION INTRAVENOUS at 10:11

## 2018-11-21 RX ADMIN — SUCCINYLCHOLINE CHLORIDE 50 MG: 20 INJECTION, SOLUTION INTRAMUSCULAR; INTRAVENOUS at 12:11

## 2018-11-21 RX ADMIN — SEVELAMER CARBONATE 1600 MG: 800 TABLET, FILM COATED ORAL at 12:11

## 2018-11-21 RX ADMIN — PHENYLEPHRINE HYDROCHLORIDE 200 MCG: 10 INJECTION INTRAVENOUS at 12:11

## 2018-11-21 RX ADMIN — VASOPRESSIN 2 UNITS: 20 INJECTION, SOLUTION INTRAMUSCULAR; SUBCUTANEOUS at 12:11

## 2018-11-21 RX ADMIN — FENTANYL CITRATE 100 MCG: 50 INJECTION, SOLUTION INTRAMUSCULAR; INTRAVENOUS at 12:11

## 2018-11-21 RX ADMIN — PHENYLEPHRINE HYDROCHLORIDE 200 MCG: 10 INJECTION INTRAVENOUS at 02:11

## 2018-11-21 RX ADMIN — VANCOMYCIN HYDROCHLORIDE 1000 MG: 1 INJECTION, POWDER, LYOPHILIZED, FOR SOLUTION INTRAVENOUS at 02:11

## 2018-11-21 RX ADMIN — HEPARIN SODIUM 5000 UNITS: 5000 INJECTION, SOLUTION INTRAVENOUS; SUBCUTANEOUS at 02:11

## 2018-11-21 RX ADMIN — DOLUTEGRAVIR SODIUM 50 MG: 50 TABLET, FILM COATED ORAL at 08:11

## 2018-11-21 RX ADMIN — OXYCODONE HYDROCHLORIDE AND ACETAMINOPHEN 1 TABLET: 10; 325 TABLET ORAL at 09:11

## 2018-11-21 RX ADMIN — ETOMIDATE 20 MG: 2 INJECTION, SOLUTION INTRAVENOUS at 12:11

## 2018-11-21 NOTE — NURSING
Instilled 2 mgs cathflow  Per port 1.7 mls  For total 4 mg PT'S dialysis catheter to be checked in 2 hours. Kan GANNON

## 2018-11-21 NOTE — ANESTHESIA POSTPROCEDURE EVALUATION
"Anesthesia Post Evaluation    Patient: Greyson Tristan III    Procedure(s) Performed: Procedure(s) (LRB):  EVACUATION, HEMATOMA (Right)  REVISION, PROCEDURE INVOLVING ARTERIOVENOUS GRAFT    Final Anesthesia Type: general  Patient location during evaluation: ICU  Patient participation: Yes- Able to Participate  Level of consciousness: awake and alert  Post-procedure vital signs: reviewed and stable  Pain management: adequate  Airway patency: patent  PONV status at discharge: No PONV  Anesthetic complications: no      Cardiovascular status: blood pressure returned to baseline  Respiratory status: unassisted  Hydration status: euvolemic  Follow-up not needed.        Visit Vitals  /68 (BP Location: Left arm, Patient Position: Lying)   Pulse 90   Temp 36.7 °C (98 °F) (Oral)   Resp 16   Ht 5' 6" (1.676 m)   Wt 68.4 kg (150 lb 12.7 oz)   SpO2 96%   BMI 24.34 kg/m²       Pain/Steph Score: Pain Assessment Performed: Yes (11/20/2018  3:00 PM)  Presence of Pain: complains of pain/discomfort (11/20/2018  3:00 PM)  Pain Rating Prior to Med Admin: 10 (11/20/2018  5:18 PM)  Pain Rating Post Med Admin: 7 (11/20/2018 11:00 AM)        "

## 2018-11-21 NOTE — NURSING
Patient is uncooperative. He continues to states that he is not in the hospital. Patient continues to ask to leave the hospital. Nurse continues to redirect the patient, and nurse explains to the patient that he just had surgery. RN will continue to monitor.

## 2018-11-21 NOTE — SUBJECTIVE & OBJECTIVE
Interval History: right arm dressed, currently denies pain in right arm, asking to go home but was advised to stay in hospital.  Review of Systems   Constitutional: Negative for chills and fever.   HENT: Negative for trouble swallowing.    Respiratory: Negative for cough and shortness of breath.    Cardiovascular: Negative for chest pain and leg swelling.   Gastrointestinal: Negative for abdominal pain, diarrhea and vomiting.   Genitourinary: Negative for dysuria and hematuria.   Musculoskeletal:        Right arm swelling   Skin: Negative for rash.   Neurological: Negative for numbness and headaches.   Psychiatric/Behavioral: Negative for confusion.     Objective:     Vital Signs (Most Recent):  Temp: 97.3 °F (36.3 °C) (11/21/18 1245)  Pulse: 92 (11/21/18 1430)  Resp: 18 (11/21/18 1430)  BP: 108/73 (11/21/18 1430)  SpO2: 96 % (11/21/18 1430) Vital Signs (24h Range):  Temp:  [97.3 °F (36.3 °C)-98.6 °F (37 °C)] 97.3 °F (36.3 °C)  Pulse:  [] 92  Resp:  [16-22] 18  SpO2:  [93 %-98 %] 96 %  BP: ()/(68-84) 108/73     Weight: 72.1 kg (158 lb 15.2 oz)  Body mass index is 25.66 kg/m².    Intake/Output Summary (Last 24 hours) at 11/21/2018 1444  Last data filed at 11/21/2018 0952  Gross per 24 hour   Intake 3740 ml   Output 1908 ml   Net 1832 ml      Physical Exam   Constitutional: He is oriented to person, place, and time. No distress.   HENT:   Head: Normocephalic and atraumatic.   Eyes: EOM are normal. Pupils are equal, round, and reactive to light.   Neck: Normal range of motion. Neck supple.   Cardiovascular: Normal rate and regular rhythm.   Pulmonary/Chest: Effort normal and breath sounds normal. No respiratory distress.   Abdominal: Soft. Bowel sounds are normal. He exhibits no distension. There is no tenderness.   Musculoskeletal:   Right arm ,dressed with drain   Neurological: He is alert and oriented to person, place, and time. No cranial nerve deficit.   Skin: Skin is warm.   Psychiatric:   Anxious         Significant Labs:   CBC:   Recent Labs   Lab 11/20/18  2356 11/21/18  0603   WBC 6.20 7.34   HGB 7.4* 11.2*   HCT 23.1* 35.1*    168     CMP:   Recent Labs   Lab 11/20/18  2349 11/21/18  0603   * 132*  132*   K 5.0 5.6*  5.6*   CL 93* 97  97   CO2 24 19*  19*   * 91  91   BUN 60* 60*  60*   CREATININE 13.7* 12.6*  12.6*   CALCIUM 9.3 9.3  9.3   ANIONGAP 16 16  16   EGFRNONAA 4* 4*  4*       Significant Imaging: I have reviewed all pertinent imaging results/findings within the past 24 hours.

## 2018-11-21 NOTE — NURSING
"Pt called on the call bell and stated "im bleeding!". I rushed to the pt's room with gauze to see the pt covered in blood and blood dripping from the axillary region. I held pressure to the site which stopped the bleeding. Rapid response was called. The pt was moved to surgery with Dr. Apple.   "

## 2018-11-21 NOTE — OP NOTE
[facility]  Brief Operative Note    SUMMARY     Surgery Date: 11/21/2018     Surgeon(s) and Role:     * Jeremiah Apple Jr., MD - Primary    Assisting Surgeon: None    Pre-op Diagnosis:  Bleeding [R58]    Post-op Diagnosis:  Post-Op Diagnosis Codes:     * Bleeding [R58]    Procedure(s) (LRB):  EVACUATION, HEMATOMA (Right)  REVISION, PROCEDURE INVOLVING ARTERIOVENOUS GRAFT    Anesthesia: Choice    Description of Procedure: repair Rt brachial artery pseudoaneurysm with PTFE interposition graft, removal PTFE avg    Description of the findings of the procedure: activel bleeding from ruptured brachial pseudoaneurysm    Estimated Blood Loss: 1000 mL         Specimens:   Specimen (12h ago, onward)    None

## 2018-11-21 NOTE — ASSESSMENT & PLAN NOTE
Us non vascular shows hematoma /abscess  Blood cx likely contaminant, repeat blood cx negative   On vanc and gentamycin with hd , s/p iv gent 11/19 as post hd  level was on lower side   Us showed pseudoaneurysm of right brachial artery  S/p repair and removal of av fistula  appr sx help  Will continue vanc , gent  for now due to emergent sx  vanc 1g, with hd , follow gent levels 2 hours after hd  Prn pain meds

## 2018-11-21 NOTE — OP NOTE
DATE OF PROCEDURE:  11/21/2018    PREOPERATIVE DIAGNOSIS:  Bleeding pseudoaneurysm, right brachial artery, S/P   Gilead-Anthony AV fistula 19 days ago.    POSTOPERATIVE DIAGNOSIS:  Bleeding pseudoaneurysm, right brachial artery, S/P   Gilead-Anthony AV fistula 19 days ago.    SURGEON:  Jeremiah Apple M.D.    ESTIMATED BLOOD LOSS:  1400 mL    BRIEF CLINICAL HISTORY:  The patient has end-stage renal disease and is status   post construction of the Gilead-Anthony AV graft of the right arm.  The patient was   admitted with edema of the right arm.  The patient noted to have a pulsatile   mass in the upper arm near the axilla.  An ultrasound showed a pseudoaneurysm.    The patient was 19 days postop and his wounds appeared to be intact; however,   approximately at 11:00 p.m. last night, the patient was noted to have   significant bleeding from the right arm.  The patient was urgently taken for   immediate surgery.    PROCEDURE:  Repair of pseudoaneurysm right brachial artery with a Gilead-Anthony   interposition graft, removal of Gilead-Anthony AV fistula.    PROCEDURE IN DETAIL:  Direct pressure was applied to the upper arm in the axilla   to stem the hemorrhage as the patient was transported to the Operating Room.    The arm was prepped and draped in a sterile fashion.  The staples were removed   from the upper arm portion of the incision and there was active bleeding.  This   was controlled with direct pressure.  The brachial artery then isolated using   blunt dissection.  Proximal and distal control were obtained.  The arterial   anastomosis was completely disrupted.  There was a small rim of normal vessel   left on the back wall.  The Gilead-Anthony graft was disconnected from the brachial   artery and the brachial artery debrided proximally and distally.  A Angeline   catheter passed distally and there was excellent inflow proximally.  No thrombus   was removed despite several passages with the Angeline.  A 6-mm Gilead-Anthony graft   was then sewn end-to-end to  the proximal and distal portions of the artery with   running 6-0 [place]-Anthony suture.  After hemostasis had been obtained, the rind and   pseudoaneurysm were debrided.  The wounds thoroughly irrigated.  Hemostasis   obtained with 3-0 Vicryl ties and ligatures.  The graft then removed from the   brachial vein after cutting the anastomotic [place]-Anthony suture.  The vein ligated   on either end with the interrupted 3-0 Vicryl sutures.  A 15 Inocencio drain brought   through separate stab wound, placed in the subcutaneous.  The wound was then   closed in layers with interrupted 3-0 Vicryl.  The skin left open.  The wound   sterilely dressed.  The drain secured with 3-0 silk.  The patient tolerated the   procedure well and left the Operating Room in good condition.  At the end of   procedure, all sponge, lap and instrument counts were correct.      ELADIO  dd: 11/21/2018 03:42:46 (CST)  td: 11/21/2018 04:08:14 (CST)  Doc ID   #[id number]  Job ID #[id number]    CC:

## 2018-11-21 NOTE — ASSESSMENT & PLAN NOTE
Has chronic anemia with esrd but had acute blood loss anemia with bleeding  S/p 4 unit prbc  Monitor h/h

## 2018-11-21 NOTE — ASSESSMENT & PLAN NOTE
Last cd4 count in 01/18 was 77  Patient on prezcobix and tivicay at home  Pharmacy was not able to get it  Patients  brought it  Continue dapsone daily

## 2018-11-21 NOTE — ANESTHESIA PREPROCEDURE EVALUATION
11/21/2018  Greyson Hudson III is a 52 y.o., male.    Anesthesia Evaluation    I have reviewed the Patient Summary Reports.    I have reviewed the Nursing Notes.   I have reviewed the Medications.     Review of Systems  Anesthesia Hx:  No problems with previous Anesthesia  History of prior surgery of interest to airway management or planning: Previous anesthesia: General Recent revision and line placement for dialysis with general anesthesia.  Procedure performed at an [facility]. Denies Family Hx of Anesthesia complications.   Denies Personal Hx of Anesthesia complications.   Social:  Former Smoker    Hematology/Oncology:     Oncology Normal    -- Anemia: Hematology Comments: HIV positive    EENT/Dental:EENT/Dental Normal   Cardiovascular:   Exercise tolerance: poor Hypertension, poorly controlled PVD hyperlipidemia    Pulmonary:  Pulmonary Normal    Renal/:   Chronic Renal Disease, ESRD, Dialysis MWF dialysis   Musculoskeletal:   Arthritis     Neurological:   Seizures, poorly controlled    Endocrine:  Endocrine Normal    Dermatological:  Skin Normal    Psych:  Psychiatric Normal           Physical Exam  General:  Well nourished    Airway/Jaw/Neck:  Airway Findings: Mouth Opening: Normal Tongue: Normal  General Airway Assessment: Adult  Mallampati: II     Eyes/Ears/Nose:  Eyes/Ears/Nose Findings:    Dental:  Dental Findings: Periodontal disease, Severe        Mental Status:  Mental Status Findings:  Flat Affect         Anesthesia Plan  Type of Anesthesia, risks & benefits discussed:  Anesthesia Type:  general, regional  Patient's Preference:   Intra-op Monitoring Plan:   Intra-op Monitoring Plan Comments:   Post Op Pain Control Plan: multimodal analgesia  Post Op Pain Control Plan Comments:   Induction:   IV  Beta Blocker:         Informed Consent: Patient understands risks and agrees with Anesthesia  plan.  Questions answered. Anesthesia consent signed with patient.  ASA Score: 3  emergent   Day of Surgery Review of History & Physical:    H&P update referred to the surgeon.     Anesthesia Plan Notes: Patient bleeding from arm. Plan to give blood as needed intra-op        Ready For Surgery From Anesthesia Perspective.

## 2018-11-21 NOTE — PLAN OF CARE
Problem: Patient Care Overview  Goal: Plan of Care Review  Outcome: Ongoing (interventions implemented as appropriate)  Pt free of fall or injury.  Pt in NAD.  AAOx4. Right arm pain well controlled with prn medication.  Denies SOB. Tolerated HD this afternoon without complication.  Right arm dressing c/d/i, RYLEE drain maintained without complication draining moderate amount of serosanguinous fluid.  VSS on room air. HD cath c/d/i.  Pt resting comfortably in bed, no complaints at this time.  Will continue to monitor.

## 2018-11-21 NOTE — ANESTHESIA POSTPROCEDURE EVALUATION
"Anesthesia Post Evaluation    Patient: Greyson Tristan III    Procedure(s) Performed: Procedure(s) (LRB):  EVACUATION, HEMATOMA (Right)  REVISION, PROCEDURE INVOLVING ARTERIOVENOUS GRAFT    Final Anesthesia Type: general  Patient location during evaluation: ICU  Patient participation: No - Unable to Participate, Sedation  Level of consciousness: sedated  Post-procedure vital signs: reviewed and stable  Pain management: adequate  Airway patency: patent  PONV status at discharge: No PONV  Anesthetic complications: no      Cardiovascular status: blood pressure returned to baseline  Respiratory status: unassisted, spontaneous ventilation and nasal cannula  Hydration status: euvolemic  Follow-up not needed.        Visit Vitals  /68 (BP Location: Left arm, Patient Position: Lying)   Pulse 90   Temp 36.7 °C (98 °F) (Oral)   Resp 16   Ht 5' 6" (1.676 m)   Wt 68.4 kg (150 lb 12.7 oz)   SpO2 96%   BMI 24.34 kg/m²       Pain/Steph Score: Pain Assessment Performed: Yes (11/20/2018  3:00 PM)  Presence of Pain: complains of pain/discomfort (11/20/2018  3:00 PM)  Pain Rating Prior to Med Admin: 10 (11/20/2018  5:18 PM)  Pain Rating Post Med Admin: 7 (11/20/2018 11:00 AM)        "

## 2018-11-21 NOTE — EICU
eICU Note : New Admit :notified by the [facility]:    Brief HPI:  52-year-old male with past medical history significant for hypertension, end-stage renal disease on hemodialysis Monday, Wednesday and Friday with seizure disorder.  While in dialysis today,patient experienced pain in the AV graft.  HD could not be done .Surgical consult was obtained,which showed edema of the right arm, status post AV graft right upper extremity. No purulence ultrasound of nonvascular hypoechoic area   Vital Signs :  Pulse rate is 90/m, blood pressure 119/68, respiratory rate 16, O2 saturation 96%    Camera Assessment :patient lying in bed in no distress      Data:  Sodium 133, potassium 5.0, Chloride  93, CO2 24, anion gap 16, BUN 60, creatinine 13.7, GFR 4, glucose 129, Calcium 9.4,   6.20, hemoglobin 7.4, Hematocrit 23.1, Platelets 256, PT 10.4, INR 0.9    Impression and recommendations:1. ESRD on HD associated with anemia   2/Infected R upper extremity AV graft .On IV Antibiotics. Vanc _ Gentamicin s/p evacuation of hematoma  R upper extremity  3.HIV positive : On HAART  4. Seizure disorder: On Keppra   5.H/o DM : RISS  6.Hypertension: Amlodipine 10 daily.  7. PUD and DVT prophylaxis : SCD and PPI        Merlyn Mendoza M.D  eICU Physician

## 2018-11-21 NOTE — PROGRESS NOTES
Progress Note  Nephrology    Admit Date: 11/16/2018   LOS: 0 days     SUBJECTIVE:     Follow-up For:  Dialysis and RUE AVF infection and pseudoaneursym    Review of Systems:  Patient was transferred to ICU after he sustained a rupture of brachial pseudoaneurysm requiring emergent intervention and repair. Notes were reviewed. He is seen on HD now. He is experiencing high venous pressures during HD.     OBJECTIVE:     Vital Signs (Most Recent)  Temp: 97.8 °F (36.6 °C) (11/21/18 0725)  Pulse: 82 (11/21/18 0930)  Resp: 17 (11/21/18 0930)  BP: 127/68 (11/21/18 0930)  SpO2: 95 % (11/21/18 0930)    Vital Signs Range (Last 24H):  Temp:  [97.6 °F (36.4 °C)-98.7 °F (37.1 °C)]   Pulse:  []   Resp:  [16-21]   BP: ()/(68-84)   SpO2:  [93 %-97 %]     I & O (Last 24H):    Intake/Output Summary (Last 24 hours) at 11/21/2018 0956  Last data filed at 11/21/2018 0600  Gross per 24 hour   Intake 2500 ml   Output 1050 ml   Net 1450 ml     Physical Exam:  NAD/cachectic  Left IJ permacath  No murmurs or gallops  Lungs clear  Abdomen is soft +BS  Ext: right upper ext dressing.    Laboratory:  CBC:  Recent Labs   Lab 11/21/18  0603   WBC 7.34   RBC 3.92*   HGB 11.2*   HCT 35.1*        BMP:  Recent Labs   Lab 11/21/18  0603   *  132*   K 5.6*  5.6*   CL 97  97   CO2 19*  19*   BUN 60*  60*   CREATININE 12.6*  12.6*   CALCIUM 9.3  9.3          ASSESSMENT/PLAN:     1- ESRD on HD with dr. Machuca at [facility] M/W/F    2- infected and bleeding brachial artery pseudoaneurysm requiring emergent repair    3- high venous pressure on HD    3- acute blood anemia requiring multiple blood transfusion    4- ESRD associated mineral bone disease    5- HIV not on cART    6- hyperkalemia    -- plan to give cathflo today and reconnect to HD machine 1-2 hr. If we still having issues then we could replace his permacath    -- blood transfusion if his Hgb < 7.0 mg/dl. No need for iron IV due to possible infection.    -- discuss  with dr. Apple plan of care. Plan to do revision next week    -- continue with phos binders    -- continue to monitor his K closely. He might require intracellular shift if we are unable to dialyze.

## 2018-11-21 NOTE — PROGRESS NOTES
[facility] Hospital Medicine  Progress Note    Patient Name: Greyson Hudson III  MRN: [id number]  Patient Class: OP- Observation   Admission Date: 11/16/2018  Length of Stay: 0 days  Attending Physician: Audrey Rider MD  Primary Care Provider: James Torres MD        Subjective:     Principal Problem:Mechanical complication of arteriovenous fistula surgically created    HPI:  52 year old male with past medical history of htn, esrd on hd m/w/f, seizure disorder had av graft placement in right arm about 2 weeks ago.He came in c/o pain in right arm , more worse today.He went to HD today but did not do it as he was hurting in his arm.He denies fever, chills, nausea, vomiting.Says cannot move his arm much.He did not fever or wbc in er.Was given a dose of vanc and gentamycin.Patient had a left side tunneled catheter for hd.He aster said he ran out of his pain meds.    Hospital Course:  Still c/o pain and swelling in right arm, no fever, cx negative so far.Got HD on 11/17/18.US non vascular showed 11 cm hypoechoic avascular lesion in right axillary region that could reflect abscess, hematoma, soft tissue mass.Patients pain was  better and able to move arm better on 11/18/18.Last evening , he developed more swelling of the arm and worsened pain from earlier.He had doppler radial  pulse and was given iv dilaudid for pain.Discussed with Dr Apple 11/19 , he felt a pulsating mass in right upper axilla, and patient will need us to r/o pseudoaneurysm.His us showed right axillary artery pseudoaneurysm and sx was planned the next morning but the patient started bleeding from the aneurysm and had to be taken to sx at night.He had repair of pseudoaneurysm right brachial artery with gore alexia interposition graft and removal of gore alexia av fistula.He received 4 units of blood.    Interval History: right arm dressed, currently denies pain in right arm, asking to go home but was advised to stay in hospital.  Review of  Systems   Constitutional: Negative for chills and fever.   HENT: Negative for trouble swallowing.    Respiratory: Negative for cough and shortness of breath.    Cardiovascular: Negative for chest pain and leg swelling.   Gastrointestinal: Negative for abdominal pain, diarrhea and vomiting.   Genitourinary: Negative for dysuria and hematuria.   Musculoskeletal:        Right arm swelling   Skin: Negative for rash.   Neurological: Negative for numbness and headaches.   Psychiatric/Behavioral: Negative for confusion.     Objective:     Vital Signs (Most Recent):  Temp: 97.3 °F (36.3 °C) (11/21/18 1245)  Pulse: 92 (11/21/18 1430)  Resp: 18 (11/21/18 1430)  BP: 108/73 (11/21/18 1430)  SpO2: 96 % (11/21/18 1430) Vital Signs (24h Range):  Temp:  [97.3 °F (36.3 °C)-98.6 °F (37 °C)] 97.3 °F (36.3 °C)  Pulse:  [] 92  Resp:  [16-22] 18  SpO2:  [93 %-98 %] 96 %  BP: ()/(68-84) 108/73     Weight: 72.1 kg (158 lb 15.2 oz)  Body mass index is 25.66 kg/m².    Intake/Output Summary (Last 24 hours) at 11/21/2018 1444  Last data filed at 11/21/2018 0952  Gross per 24 hour   Intake 3740 ml   Output 1908 ml   Net 1832 ml      Physical Exam   Constitutional: He is oriented to person, place, and time. No distress.   HENT:   Head: Normocephalic and atraumatic.   Eyes: EOM are normal. Pupils are equal, round, and reactive to light.   Neck: Normal range of motion. Neck supple.   Cardiovascular: Normal rate and regular rhythm.   Pulmonary/Chest: Effort normal and breath sounds normal. No respiratory distress.   Abdominal: Soft. Bowel sounds are normal. He exhibits no distension. There is no tenderness.   Musculoskeletal:   Right arm ,dressed with drain   Neurological: He is alert and oriented to person, place, and time. No cranial nerve deficit.   Skin: Skin is warm.   Psychiatric:   Anxious        Significant Labs:   CBC:   Recent Labs   Lab 11/20/18  2356 11/21/18  0603   WBC 6.20 7.34   HGB 7.4* 11.2*   HCT 23.1* 35.1*     168     CMP:   Recent Labs   Lab 11/20/18  2349 11/21/18  0603   * 132*  132*   K 5.0 5.6*  5.6*   CL 93* 97  97   CO2 24 19*  19*   * 91  91   BUN 60* 60*  60*   CREATININE 13.7* 12.6*  12.6*   CALCIUM 9.3 9.3  9.3   ANIONGAP 16 16  16   EGFRNONAA 4* 4*  4*       Significant Imaging: I have reviewed all pertinent imaging results/findings within the past 24 hours.    Assessment/Plan:      * Mechanical complication of arteriovenous fistula surgically created      Us non vascular shows hematoma /abscess  Blood cx likely contaminant, repeat blood cx negative   On vanc and gentamycin with hd , s/p iv gent 11/19 as post hd  level was on lower side   Us showed pseudoaneurysm of right brachial artery  S/p repair and removal of av fistula  appr sx help  Will continue vanc , gent  for now due to emergent sx  vanc 1g, with hd , follow gent levels 2 hours after hd  Prn pain meds         Acute blood loss anemia    Has chronic anemia with esrd but had acute blood loss anemia with bleeding  S/p 4 unit prbc  Monitor h/h       Bacteremia      Blood cx grew coagulase negative staph in 1 bottle, likely contaminant.  Repeat blood cx negative     Hyperkalemia    Mild hyperkalemia,   Monitor after hd       Seizure disorder    Continue home meds of keppra       Anemia in ESRD (end-stage renal disease)    On procrit with hd       ESRD (end stage renal disease) on dialysis    Patient missed hd 11/16  S/p hd 11/17, 11/19 , 11/21  Nephrology following       HIV disease    Last cd4 count in 01/18 was 77  Patient on prezcobix and tivicay at home  Pharmacy was not able to get it  Patients  brought it  Continue dapsone daily         Essential hypertension    Continue norvasc and monitor bp  Stable  Hold for low bp       VTE Risk Mitigation (From admission, onward)        Ordered     heparin (porcine) injection 5,000 Units  As needed (PRN)      11/17/18 1111     IP VTE HIGH RISK PATIENT  Once      11/16/18 1644      Place sequential compression device  Until discontinued      11/16/18 2088              Audrey Rider MD  Department of Hospital Medicine   [facility]

## 2018-11-22 LAB
ANION GAP SERPL CALC-SCNC: 6 MMOL/L
BASOPHILS # BLD AUTO: 0.01 K/UL
BASOPHILS NFR BLD: 0.2 %
BUN SERPL-MCNC: 32 MG/DL
CALCIUM SERPL-MCNC: 8.8 MG/DL
CHLORIDE SERPL-SCNC: 96 MMOL/L
CO2 SERPL-SCNC: 30 MMOL/L
CREAT SERPL-MCNC: 8.8 MG/DL
DIFFERENTIAL METHOD: ABNORMAL
EOSINOPHIL # BLD AUTO: 0.2 K/UL
EOSINOPHIL NFR BLD: 4.6 %
ERYTHROCYTE [DISTWIDTH] IN BLOOD BY AUTOMATED COUNT: 15.7 %
EST. GFR  (AFRICAN AMERICAN): 7 ML/MIN/1.73 M^2
EST. GFR  (NON AFRICAN AMERICAN): 6 ML/MIN/1.73 M^2
GLUCOSE SERPL-MCNC: 89 MG/DL
HCT VFR BLD AUTO: 28.4 %
HGB BLD-MCNC: 8.9 G/DL
LYMPHOCYTES # BLD AUTO: 0.9 K/UL
LYMPHOCYTES NFR BLD: 18 %
MCH RBC QN AUTO: 28 PG
MCHC RBC AUTO-ENTMCNC: 31.3 G/DL
MCV RBC AUTO: 89 FL
MONOCYTES # BLD AUTO: 0.9 K/UL
MONOCYTES NFR BLD: 19.1 %
NEUTROPHILS # BLD AUTO: 2.8 K/UL
NEUTROPHILS NFR BLD: 57.9 %
PLATELET # BLD AUTO: 152 K/UL
PMV BLD AUTO: 9.8 FL
POTASSIUM SERPL-SCNC: 4.5 MMOL/L
RBC # BLD AUTO: 3.18 M/UL
SODIUM SERPL-SCNC: 132 MMOL/L
WBC # BLD AUTO: 4.77 K/UL

## 2018-11-22 PROCEDURE — 25000003 PHARM REV CODE 250: Performed by: INTERNAL MEDICINE

## 2018-11-22 PROCEDURE — 80048 BASIC METABOLIC PNL TOTAL CA: CPT

## 2018-11-22 PROCEDURE — 94761 N-INVAS EAR/PLS OXIMETRY MLT: CPT

## 2018-11-22 PROCEDURE — 99233 SBSQ HOSP IP/OBS HIGH 50: CPT | Mod: ,,, | Performed by: INTERNAL MEDICINE

## 2018-11-22 PROCEDURE — 36415 COLL VENOUS BLD VENIPUNCTURE: CPT

## 2018-11-22 PROCEDURE — 85025 COMPLETE CBC W/AUTO DIFF WBC: CPT

## 2018-11-22 PROCEDURE — 11000001 HC ACUTE MED/SURG PRIVATE ROOM

## 2018-11-22 RX ADMIN — SEVELAMER CARBONATE 1600 MG: 800 TABLET, FILM COATED ORAL at 09:11

## 2018-11-22 RX ADMIN — OXYCODONE HYDROCHLORIDE AND ACETAMINOPHEN 1 TABLET: 10; 325 TABLET ORAL at 11:11

## 2018-11-22 RX ADMIN — FLUOXETINE 40 MG: 20 CAPSULE ORAL at 09:11

## 2018-11-22 RX ADMIN — SEVELAMER CARBONATE 1600 MG: 800 TABLET, FILM COATED ORAL at 06:11

## 2018-11-22 RX ADMIN — DAPSONE 100 MG: 25 TABLET ORAL at 09:11

## 2018-11-22 RX ADMIN — OXYCODONE HYDROCHLORIDE AND ACETAMINOPHEN 1 TABLET: 10; 325 TABLET ORAL at 04:11

## 2018-11-22 RX ADMIN — PANTOPRAZOLE SODIUM 40 MG: 40 TABLET, DELAYED RELEASE ORAL at 09:11

## 2018-11-22 RX ADMIN — LEVETIRACETAM 500 MG: 500 TABLET, FILM COATED ORAL at 08:11

## 2018-11-22 RX ADMIN — SEVELAMER CARBONATE 1600 MG: 800 TABLET, FILM COATED ORAL at 11:11

## 2018-11-22 RX ADMIN — LEVETIRACETAM 500 MG: 500 TABLET, FILM COATED ORAL at 09:11

## 2018-11-22 RX ADMIN — DOLUTEGRAVIR SODIUM 50 MG: 50 TABLET, FILM COATED ORAL at 09:11

## 2018-11-22 NOTE — NURSING TRANSFER
Nursing Transfer Note      11/22/2018     Transfer To: [id number]    Transfer via wheelchair    Transfer with cardiac monitoring    Transported by RN    Medicines sent: yes    Chart send with patient: Yes    Patient reassessed at: 11/22/18 at 1605    Upon arrival to floor: cardiac monitor applied, patient oriented to room, call bell in reach and bed in lowest position    Report given to TERESSA Almodovar.

## 2018-11-22 NOTE — PROGRESS NOTES
[facility] Progress Note    Admit Date: 11/16/2018   LOS: 1 day     SUBJECTIVE:     Follow-up For:  ESRD evaluation and management    Overnight events:  Doing well today, no new complaints.  He states his RUE is healing well and the swelling is improving and the pain is also improving.  He has been receiving pain medications, which have been alleviating the pain.  Otherwise, he denies recent CP, SOB, palp, abd pain, n/v/d, LE swelling.  Appetite has been great.    Scheduled Meds:   sodium chloride 0.9%   Intravenous Once    amLODIPine  10 mg Oral Daily    dapsone  100 mg Oral Daily    darunavir-cobicistat  1 tablet Oral Daily    dolutegravir  50 mg Oral Daily    epoetin lisa (PROCRIT) injection  10,000 Units Intravenous Every Mon, Wed, Fri    FLUoxetine  40 mg Oral Daily    levETIRAcetam  500 mg Oral BID    pantoprazole  40 mg Oral Daily    sevelamer carbonate  1,600 mg Oral TID WM     Continuous Infusions:  PRN Meds:sodium chloride, sodium chloride 0.9%, sodium chloride 0.9%, dextrose 50%, dextrose 50%, fentaNYL, glucagon (human recombinant), glucose, glucose, heparin (porcine), morphine, ondansetron, oxyCODONE, oxyCODONE-acetaminophen, promethazine (PHENERGAN) IVPB, ramelteon, sodium chloride 0.9%    Review of patient's allergies indicates:   Allergen Reactions    Pcn [penicillins] Itching       Review of Systems  10 point ROS done and is neg besides what is listed above in HPI    OBJECTIVE:     Vital Signs (Most Recent)  Temp: 97.8 °F (36.6 °C) (11/22/18 0705)  Pulse: 90 (11/22/18 1010)  Resp: 19 (11/22/18 1010)  BP: (!) 99/56 (11/22/18 1010)  SpO2: 97 % (11/22/18 1010)    Vital Signs Range (Last 24H):  Temp:  [97.3 °F (36.3 °C)-98.4 °F (36.9 °C)]   Pulse:  [74-94]   Resp:  [13-22]   BP: ()/(54-78)   SpO2:  [94 %-99 %]     I & O (Last 24H):    Intake/Output Summary (Last 24 hours) at 11/22/2018 1140  Last data filed at 11/22/2018 0305  Gross per 24 hour   Intake 730 ml   Output 1540 ml   Net  -810 ml     Physical Exam:  GEN:  appears well, comfortably lying in bed, in no acute distress  HEENT/NECK:  anicteric sclera, MMM, no apparent JVD  CVS:  RRR with no murmurs or rubs  PULM:  CTAB  ABD:  soft, nontender, nondistended, normal bowel sounds  EXTR:  no edema  SKIN:  no rashes or lesions, warm and not diaphoretic  ACCESS:  L IJ TDC, clean; RUE wrapped in ACE    Laboratory:  CBC:   Recent Labs   Lab 11/22/18 0453   WBC 4.77   RBC 3.18*   HGB 8.9*   HCT 28.4*      MCV 89   MCH 28.0   MCHC 31.3*     CMP:   Recent Labs   Lab 11/16/18 1230 11/22/18 0453   GLU 86   < > 89   CALCIUM 9.9   < > 8.8   ALBUMIN 2.8*  --   --    PROT 8.6*  --   --       < > 132*   K 5.6*   < > 4.5   CO2 29   < > 30*   CL 96   < > 96   BUN 52*   < > 32*   CREATININE 13.1*   < > 8.8*   ALKPHOS 82  --   --    ALT 8*  --   --    AST 13  --   --    BILITOT 0.3  --   --     < > = values in this interval not displayed.     Cardiac markers: No results for input(s): CKMB, CPKMB, TROPONINT, TROPONINI, MYOGLOBIN in the last 168 hours.  Microbiology Results (last 7 days)     Procedure Component Value Units Date/Time    Blood culture [[id number]] Collected:  11/19/18 1753    Order Status:  Completed Specimen:  Blood Updated:  11/22/18 0612     Blood Culture, Routine No Growth to date     Blood Culture, Routine No Growth to date     Blood Culture, Routine No Growth to date    Blood culture [[id number]] Collected:  11/19/18 1753    Order Status:  Completed Specimen:  Blood Updated:  11/22/18 0612     Blood Culture, Routine No Growth to date     Blood Culture, Routine No Growth to date     Blood Culture, Routine No Growth to date    Blood culture #2 [[id number]] Collected:  11/16/18 1230    Order Status:  Completed Specimen:  Blood from Peripheral, Hand, Left Updated:  11/21/18 2012     Blood Culture, Routine No growth after 5 days.    Narrative:       Blood Culture #2    Blood culture #1 [[id number]] Collected:  11/16/18 1220    Order  Status:  Completed Specimen:  Blood from Peripheral, Antecubital, Left Updated:  11/19/18 0956     Blood Culture, Routine Gram stain aer bottle: Gram positive cocci in clusters resembling Staph     Blood Culture, Routine Results called to and read back by:Ancelmo Hutchins LPN 11/17/2018  14:37     Blood Culture, Routine --     COAGULASE-NEGATIVE STAPHYLOCOCCUS SPECIES  Organism is a probable contaminant      Narrative:       Blood Culture #1          Diagnostic Results:  Labs: Reviewed    ASSESSMENT/PLAN:     52 year old M with h/o ESRD (MWF at [facility] under my care), HTN, seizure d/o, HIV who presents with swelling in his RUE, 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 Apple, doing much better.    1. ESRD - as above  - will dialyze thrice weekly unless more urgent indications arise  - strict I/O, daily weights  - avoid gadolinium, fleets, other phos-based laxatives, NSAIDs, aminoglycosides, IV contrast only if necessary    2. HTN  - currently well controlled, in fact slightly hypotensive    3. Anemia ESRD and recent blood loss  - cont to monitor, Hgb stable  - EPO with HD    4. MBD of ESRD  - binders with meals    5. Seizure d/o  - keppra dose after HD  - he was advised to limit his fluid intake so that we would be less aggressive on UF      At least 30 minutes were spent with the patient and nursing staff in the critical care unit, discussing the diagnoses, prognosis and management.      James Torres MD

## 2018-11-22 NOTE — NURSING
VSS, afebrile. Ace dressing remains in place, RYLEE drain output 10ml. Patient able to move fingers and arms which he states he could not do before. Pt received oxycodone once. DENISE elevated on pillows. Patient ambulated to the commode twice without any problems. All safety precautions maintained.  RN will continue to monitor closely.

## 2018-11-22 NOTE — SUBJECTIVE & OBJECTIVE
Interval History: right arm dressed, currently denies pain in right armReview of Systems   Constitutional: Negative for chills and fever.   HENT: Negative for trouble swallowing.    Respiratory: Negative for cough and shortness of breath.    Cardiovascular: Negative for chest pain and leg swelling.   Gastrointestinal: Negative for abdominal pain, diarrhea and vomiting.   Genitourinary: Negative for dysuria and hematuria.   Musculoskeletal:        Right arm swelling   Skin: Negative for rash.   Neurological: Negative for numbness and headaches.   Psychiatric/Behavioral: Negative for confusion.     Objective:     Vital Signs (Most Recent):  Temp: 98 °F (36.7 °C) (11/22/18 1105)  Pulse: 86 (11/22/18 1300)  Resp: 15 (11/22/18 1300)  BP: (!) 91/51 (11/22/18 1300)  SpO2: 95 % (11/22/18 1300) Vital Signs (24h Range):  Temp:  [97.3 °F (36.3 °C)-98.4 °F (36.9 °C)] 98 °F (36.7 °C)  Pulse:  [74-96] 86  Resp:  [13-22] 15  SpO2:  [94 %-99 %] 95 %  BP: ()/(51-78) 91/51     Weight: 71.7 kg (158 lb 1.1 oz)  Body mass index is 25.51 kg/m².    Intake/Output Summary (Last 24 hours) at 11/22/2018 1410  Last data filed at 11/22/2018 1200  Gross per 24 hour   Intake 960 ml   Output 1540 ml   Net -580 ml      Physical Exam   Constitutional: He is oriented to person, place, and time. No distress.   HENT:   Head: Normocephalic and atraumatic.   Eyes: EOM are normal. Pupils are equal, round, and reactive to light.   Neck: Normal range of motion. Neck supple.   Cardiovascular: Normal rate and regular rhythm.   Pulmonary/Chest: Effort normal and breath sounds normal. No respiratory distress.   Abdominal: Soft. Bowel sounds are normal. He exhibits no distension. There is no tenderness.   Musculoskeletal:   Right arm ,dressed with drain   Neurological: He is alert and oriented to person, place, and time. No cranial nerve deficit.   Skin: Skin is warm.   Psychiatric:   Anxious        Significant Labs:   CBC:   Recent Labs   Lab  11/20/18  2356 11/21/18  0603 11/22/18  0453   WBC 6.20 7.34 4.77   HGB 7.4* 11.2* 8.9*   HCT 23.1* 35.1* 28.4*    168 152     CMP:   Recent Labs   Lab 11/20/18  2349 11/21/18  0603 11/22/18  0453   * 132*  132* 132*   K 5.0 5.6*  5.6* 4.5   CL 93* 97  97 96   CO2 24 19*  19* 30*   * 91  91 89   BUN 60* 60*  60* 32*   CREATININE 13.7* 12.6*  12.6* 8.8*   CALCIUM 9.3 9.3  9.3 8.8   ANIONGAP 16 16  16 6*   EGFRNONAA 4* 4*  4* 6*       Significant Imaging: I have reviewed all pertinent imaging results/findings within the past 24 hours.

## 2018-11-22 NOTE — ASSESSMENT & PLAN NOTE
Us non vascular shows hematoma /abscess  Blood cx likely contaminant, repeat blood cx negative   On vanc and gentamycin with hd , s/p iv gent 11/19 as post hd  level was on lower side   Us showed pseudoaneurysm of right brachial artery  S/p repair and removal of av fistula  appr sx help  Will continue vanc , gent  due to emergent sx, talked to dr coombs , wants to continue for 2 weeks with hd.  S/p last vanc and gent on11/21  - monitor vanc levels before hd and gentamicin levels after hd  Prn pain meds

## 2018-11-22 NOTE — PLAN OF CARE
Problem: Pain, Acute (Adult)  Goal: Acceptable Pain Control/Comfort Level  Patient will demonstrate the desired outcomes by discharge/transition of care.  Outcome: Ongoing (interventions implemented as appropriate)  RYLEE drain to RUE. Patient on telemetry. Remains free from fall, injury, and skin breakdown. Ambulates independently to bathroom. VSS stable on RA and afebrile. Positions self independently. Pain controlled with PO PRN meds. Patient refused SCDs.Tolerating renal diet. IV site WNL. Plan of care reviewed with patient and all questions answered. Bed low, locked w/ bed alarm on. Call light within reach. Purposeful rounding performed. No other complaints at this time.

## 2018-11-22 NOTE — PROGRESS NOTES
[facility] Hospital Medicine  Progress Note    Patient Name: Greyson Hudson III  MRN: [id number]  Patient Class: IP- Inpatient   Admission Date: 11/16/2018  Length of Stay: 1 days  Attending Physician: Audrey Rider MD  Primary Care Provider: James Torres MD        Subjective:     Principal Problem:Mechanical complication of arteriovenous fistula surgically created    HPI:  52 year old male with past medical history of htn, esrd on hd m/w/f, seizure disorder had av graft placement in right arm about 2 weeks ago.He came in c/o pain in right arm , more worse today.He went to HD today but did not do it as he was hurting in his arm.He denies fever, chills, nausea, vomiting.Says cannot move his arm much.He did not fever or wbc in er.Was given a dose of vanc and gentamycin.Patient had a left side tunneled catheter for hd.He aster said he ran out of his pain meds.    Hospital Course:  Still c/o pain and swelling in right arm, no fever, cx negative so far.Got HD on 11/17/18.US non vascular showed 11 cm hypoechoic avascular lesion in right axillary region that could reflect abscess, hematoma, soft tissue mass.Patients pain was  better and able to move arm better on 11/18/18.Last evening , he developed more swelling of the arm and worsened pain from earlier.He had doppler radial  pulse and was given iv dilaudid for pain.Discussed with Dr Apple 11/19 , he felt a pulsating mass in right upper axilla, and patient will need us to r/o pseudoaneurysm.His us showed right axillary artery pseudoaneurysm and sx was planned the next morning but the patient started bleeding from the aneurysm and had to be taken to sx at night.He had repair of pseudoaneurysm right brachial artery with gore alexia interposition graft and removal of gore alexia av fistula.He received 4 units of blood.    Interval History: right arm dressed, currently denies pain in right armReview of Systems   Constitutional: Negative for chills and fever.    HENT: Negative for trouble swallowing.    Respiratory: Negative for cough and shortness of breath.    Cardiovascular: Negative for chest pain and leg swelling.   Gastrointestinal: Negative for abdominal pain, diarrhea and vomiting.   Genitourinary: Negative for dysuria and hematuria.   Musculoskeletal:        Right arm swelling   Skin: Negative for rash.   Neurological: Negative for numbness and headaches.   Psychiatric/Behavioral: Negative for confusion.     Objective:     Vital Signs (Most Recent):  Temp: 98 °F (36.7 °C) (11/22/18 1105)  Pulse: 86 (11/22/18 1300)  Resp: 15 (11/22/18 1300)  BP: (!) 91/51 (11/22/18 1300)  SpO2: 95 % (11/22/18 1300) Vital Signs (24h Range):  Temp:  [97.3 °F (36.3 °C)-98.4 °F (36.9 °C)] 98 °F (36.7 °C)  Pulse:  [74-96] 86  Resp:  [13-22] 15  SpO2:  [94 %-99 %] 95 %  BP: ()/(51-78) 91/51     Weight: 71.7 kg (158 lb 1.1 oz)  Body mass index is 25.51 kg/m².    Intake/Output Summary (Last 24 hours) at 11/22/2018 1410  Last data filed at 11/22/2018 1200  Gross per 24 hour   Intake 960 ml   Output 1540 ml   Net -580 ml      Physical Exam   Constitutional: He is oriented to person, place, and time. No distress.   HENT:   Head: Normocephalic and atraumatic.   Eyes: EOM are normal. Pupils are equal, round, and reactive to light.   Neck: Normal range of motion. Neck supple.   Cardiovascular: Normal rate and regular rhythm.   Pulmonary/Chest: Effort normal and breath sounds normal. No respiratory distress.   Abdominal: Soft. Bowel sounds are normal. He exhibits no distension. There is no tenderness.   Musculoskeletal:   Right arm ,dressed with drain   Neurological: He is alert and oriented to person, place, and time. No cranial nerve deficit.   Skin: Skin is warm.   Psychiatric:   Anxious        Significant Labs:   CBC:   Recent Labs   Lab 11/20/18  2356 11/21/18  0603 11/22/18  0453   WBC 6.20 7.34 4.77   HGB 7.4* 11.2* 8.9*   HCT 23.1* 35.1* 28.4*    168 152     CMP:   Recent  Labs   Lab 11/20/18  2349 11/21/18  0603 11/22/18  0453   * 132*  132* 132*   K 5.0 5.6*  5.6* 4.5   CL 93* 97  97 96   CO2 24 19*  19* 30*   * 91  91 89   BUN 60* 60*  60* 32*   CREATININE 13.7* 12.6*  12.6* 8.8*   CALCIUM 9.3 9.3  9.3 8.8   ANIONGAP 16 16  16 6*   EGFRNONAA 4* 4*  4* 6*       Significant Imaging: I have reviewed all pertinent imaging results/findings within the past 24 hours.    Assessment/Plan:      * Mechanical complication of arteriovenous fistula surgically created      Us non vascular shows hematoma /abscess  Blood cx likely contaminant, repeat blood cx negative   On vanc and gentamycin with hd , s/p iv gent 11/19 as post hd  level was on lower side   Us showed pseudoaneurysm of right brachial artery  S/p repair and removal of av fistula  appr sx help  Will continue vanc , gent  due to emergent sx, talked to dr coombs , wants to continue for 2 weeks with hd.  S/p last vanc and gent on11/21  - monitor vanc levels before hd and gentamicin levels after hd  Prn pain meds         Acute blood loss anemia    Has chronic anemia with esrd but had acute blood loss anemia with bleeding  S/p 4 unit prbc  Monitor h/h       Bacteremia      Blood cx grew coagulase negative staph in 1 bottle, likely contaminant.  Repeat blood cx negative     Hyperkalemia    Mild hyperkalemia  Improved with  hd       Seizure disorder    Continue home meds of keppra       Anemia in ESRD (end-stage renal disease)    On procrit with hd       ESRD (end stage renal disease) on dialysis    Patient missed hd 11/16  S/p hd 11/17, 11/19 , 11/21  Nephrology following       HIV disease    Last cd4 count in 01/18 was 77  Patient on prezcobix and tivicay at home  Pharmacy was not able to get it  Patients  brought it  Continue dapsone daily         Essential hypertension    Low today  Hold meds for hypotension       VTE Risk Mitigation (From admission, onward)        Ordered     heparin (porcine)  injection 5,000 Units  As needed (PRN)      11/17/18 1111     IP VTE HIGH RISK PATIENT  Once      11/16/18 1644     Place sequential compression device  Until discontinued      11/16/18 1644              Audrey Rider MD  Department of Hospital Medicine   [facility]

## 2018-11-23 LAB
ANION GAP SERPL CALC-SCNC: 14 MMOL/L
BASOPHILS # BLD AUTO: 0.01 K/UL
BASOPHILS NFR BLD: 0.2 %
BUN SERPL-MCNC: 48 MG/DL
CALCIUM SERPL-MCNC: 8.9 MG/DL
CHLORIDE SERPL-SCNC: 95 MMOL/L
CO2 SERPL-SCNC: 21 MMOL/L
CREAT SERPL-MCNC: 11.5 MG/DL
DIFFERENTIAL METHOD: ABNORMAL
EOSINOPHIL # BLD AUTO: 0.3 K/UL
EOSINOPHIL NFR BLD: 5.9 %
ERYTHROCYTE [DISTWIDTH] IN BLOOD BY AUTOMATED COUNT: 15.6 %
EST. GFR  (AFRICAN AMERICAN): 5 ML/MIN/1.73 M^2
EST. GFR  (NON AFRICAN AMERICAN): 5 ML/MIN/1.73 M^2
GENTAMICIN SERPL-MCNC: 1.5 UG/ML
GLUCOSE SERPL-MCNC: 81 MG/DL
HCT VFR BLD AUTO: 28.4 %
HGB BLD-MCNC: 8.8 G/DL
LYMPHOCYTES # BLD AUTO: 1.3 K/UL
LYMPHOCYTES NFR BLD: 24 %
MCH RBC QN AUTO: 27.8 PG
MCHC RBC AUTO-ENTMCNC: 31 G/DL
MCV RBC AUTO: 90 FL
MONOCYTES # BLD AUTO: 0.6 K/UL
MONOCYTES NFR BLD: 10.2 %
NEUTROPHILS # BLD AUTO: 3.2 K/UL
NEUTROPHILS NFR BLD: 59.3 %
PLATELET # BLD AUTO: 192 K/UL
PMV BLD AUTO: 9.9 FL
POTASSIUM SERPL-SCNC: 5.2 MMOL/L
RBC # BLD AUTO: 3.16 M/UL
SODIUM SERPL-SCNC: 130 MMOL/L
VANCOMYCIN SERPL-MCNC: 18 UG/ML
WBC # BLD AUTO: 5.41 K/UL

## 2018-11-23 PROCEDURE — 63600175 PHARM REV CODE 636 W HCPCS: Performed by: INTERNAL MEDICINE

## 2018-11-23 PROCEDURE — 11000001 HC ACUTE MED/SURG PRIVATE ROOM

## 2018-11-23 PROCEDURE — 80048 BASIC METABOLIC PNL TOTAL CA: CPT

## 2018-11-23 PROCEDURE — 36415 COLL VENOUS BLD VENIPUNCTURE: CPT

## 2018-11-23 PROCEDURE — 80100016 HC MAINTENANCE HEMODIALYSIS

## 2018-11-23 PROCEDURE — 80170 ASSAY OF GENTAMICIN: CPT

## 2018-11-23 PROCEDURE — 80202 ASSAY OF VANCOMYCIN: CPT

## 2018-11-23 PROCEDURE — 25000003 PHARM REV CODE 250: Performed by: INTERNAL MEDICINE

## 2018-11-23 PROCEDURE — 85025 COMPLETE CBC W/AUTO DIFF WBC: CPT

## 2018-11-23 PROCEDURE — 99233 SBSQ HOSP IP/OBS HIGH 50: CPT | Mod: ,,, | Performed by: INTERNAL MEDICINE

## 2018-11-23 RX ORDER — OXYCODONE AND ACETAMINOPHEN 10; 325 MG/1; MG/1
1 TABLET ORAL EVERY 4 HOURS PRN
Status: DISCONTINUED | OUTPATIENT
Start: 2018-11-23 | End: 2018-11-27 | Stop reason: HOSPADM

## 2018-11-23 RX ORDER — HEPARIN SODIUM 5000 [USP'U]/ML
5000 INJECTION, SOLUTION INTRAVENOUS; SUBCUTANEOUS EVERY 12 HOURS
Status: DISCONTINUED | OUTPATIENT
Start: 2018-11-23 | End: 2018-11-27 | Stop reason: HOSPADM

## 2018-11-23 RX ADMIN — OXYCODONE HYDROCHLORIDE AND ACETAMINOPHEN 1 TABLET: 10; 325 TABLET ORAL at 04:11

## 2018-11-23 RX ADMIN — OXYCODONE HYDROCHLORIDE AND ACETAMINOPHEN 1 TABLET: 10; 325 TABLET ORAL at 03:11

## 2018-11-23 RX ADMIN — SEVELAMER CARBONATE 1600 MG: 800 TABLET, FILM COATED ORAL at 06:11

## 2018-11-23 RX ADMIN — LEVETIRACETAM 500 MG: 500 TABLET, FILM COATED ORAL at 09:11

## 2018-11-23 RX ADMIN — ERYTHROPOIETIN 10000 UNITS: 10000 INJECTION, SOLUTION INTRAVENOUS; SUBCUTANEOUS at 02:11

## 2018-11-23 RX ADMIN — MORPHINE SULFATE 2 MG: 4 INJECTION, SOLUTION INTRAMUSCULAR; INTRAVENOUS at 06:11

## 2018-11-23 RX ADMIN — DOLUTEGRAVIR SODIUM 50 MG: 50 TABLET, FILM COATED ORAL at 08:11

## 2018-11-23 RX ADMIN — HEPARIN SODIUM 5000 UNITS: 5000 INJECTION, SOLUTION INTRAVENOUS; SUBCUTANEOUS at 09:11

## 2018-11-23 RX ADMIN — PANTOPRAZOLE SODIUM 40 MG: 40 TABLET, DELAYED RELEASE ORAL at 08:11

## 2018-11-23 RX ADMIN — SEVELAMER CARBONATE 1600 MG: 800 TABLET, FILM COATED ORAL at 02:11

## 2018-11-23 RX ADMIN — FLUOXETINE 40 MG: 20 CAPSULE ORAL at 08:11

## 2018-11-23 RX ADMIN — DAPSONE 100 MG: 25 TABLET ORAL at 08:11

## 2018-11-23 RX ADMIN — SEVELAMER CARBONATE 1600 MG: 800 TABLET, FILM COATED ORAL at 08:11

## 2018-11-23 RX ADMIN — DEXTROSE 500 MG: 50 INJECTION, SOLUTION INTRAVENOUS at 03:11

## 2018-11-23 RX ADMIN — LEVETIRACETAM 500 MG: 500 TABLET, FILM COATED ORAL at 08:11

## 2018-11-23 NOTE — ASSESSMENT & PLAN NOTE
Us non vascular shows hematoma /abscess  Blood cx likely contaminant, repeat blood cx negative   On vanc and gentamycin with hd , s/p iv gent 11/19 as post hd  level was on lower side   Us showed pseudoaneurysm of right brachial artery  S/p repair and removal of av fistula  appr sx help  Will continue vanc , gent  due to emergent sx, talked to dr coombs , wants to continue for 2 weeks with hd.  S/p last vanc and gent on11/21  -vanc level 18 before HD, will give 500 mg vanc after HD   - CHECK gent level 2 hours after HD   -  Prn pain meds

## 2018-11-23 NOTE — PLAN OF CARE
Pt states he lives at [facility] and is assisted there by , Ms. Rita.  [phone].  No answer.    Pt attends HD MWF at [facility] on [facility] in [facility] under Dr. Torres.  [phone] / fax [phone].  Spoke with Rita.  They can proceed with labs and IV Abx following HD if orders, labs and DC summary are faxed to their office prior to pt leaving hospital.  Rita states Vanc and Gent are medications they have, so no need to provide order time.     Per Dr. Apple and Dr. Rider pt to be monitored over the weekend, anticipate DC on Monday.   Dr. Apple states pt will need Vanc and Gent for 2 weeks from Surgery date. (expected stop date 12/5/2018)    Pt states he will need Medicaid transportation home.     CM to continue to follow.       11/23/18 1502   Discharge Reassessment   Assessment Type Discharge Planning Reassessment   Provided patient/caregiver education on the expected discharge date and the discharge plan Yes   Do you have any problems affording any of your prescribed medications? No   Discharge Plan A Home   Patient choice form signed by patient/caregiver N/A   Anticipated Discharge Disposition Home   Can the patient answer the patient profile reliably? Yes, cognitively intact   How does the patient rate their overall health at the present time? Good

## 2018-11-23 NOTE — ASSESSMENT & PLAN NOTE
Has chronic anemia with esrd but had acute blood loss anemia with bleeding  S/p 4 unit prbc  Monitor h/h, STABLE

## 2018-11-23 NOTE — PLAN OF CARE
[facility]    INFUSION ORDERS  FACE TO FACE ENCOUNTER    Patient Name: Greyson Hudson III  YOB: 1965    PCP: James Torres MD   PCP Address: [address] [place] [address] [address] / [place] [address]  PCP Phone Number: [phone]  PCP Fax: [phone]    Encounter Date: 11/26/2018    Diagnoses:  Active Hospital Problems    Diagnosis  POA    *Mechanical complication of arteriovenous fistula surgically created [T82.590A]  Yes    Cellulitis of arm, right [L03.113]  Yes    Acute blood loss anemia [D62]  Yes    Bacteremia [R78.81]  No    Seizure disorder [G40.909]  Yes    Hyperkalemia [E87.5]  Yes    Anemia in ESRD (end-stage renal disease) [N18.6, D63.1]  Yes    ESRD (end stage renal disease) on dialysis [N18.6, Z99.2]  Not Applicable    HIV disease [B20]  Yes     Chronic    Essential hypertension [I10]  Yes     Chronic      Resolved Hospital Problems   No resolved problems to display.       Future Appointments   Date Time Provider Department Center   11/29/2018  1:40 PM Jeremiah Apple Jr., MD [facility]           I have seen and examined this patient face to face today. My clinical findings that support the need for the home health skilled services and home bound status are the following:  Outpatient IV Antibiotics following Hemodialysis    Allergies:  Review of patient's allergies indicates:   Allergen Reactions    Pcn [penicillins] Itching       Nursing:   Notify MD if SBP > 160 or < 90; DBP > 90 or < 50; HR > 120 or < 50; Temp > 101;       MISCELLANEOUS CARE:    Administer (drug and dose): vancomycin 500 mg iv with HD, Gentamicin 100 mg iv  after HD for a week duration.      Medications: Review discharge medications with patient and family and provide education.       Greyson Hudson III   Home Medication Instructions ASHLIE:[id number]    Printed on:11/26/18 1000   Medication Information                      amlodipine (NORVASC) 10 MG tablet  Take 1 tablet (10 mg total)  by mouth once daily.             carvedilol (COREG) 3.125 MG tablet  Take 3.125 mg by mouth 2 (two) times daily with meals.             dapsone 100 MG Tab  Take 100 mg by mouth once daily.             darunavir-cobicistat (PREZCOBIX) 800-150 mg-mg Tab  Take 1 tablet by mouth once daily.             dolutegravir (TIVICAY) 50 mg Tab  Take 50 mg by mouth once daily.             FLUoxetine (PROZAC) 40 MG capsule  Take 40 mg by mouth once daily.             levETIRAcetam (KEPPRA) 500 MG Tab  Take 1 tablet (500 mg total) by mouth 2 (two) times daily.             mirtazapine (REMERON) 15 MG tablet  Take 15 mg by mouth every evening.             oxyCODONE-acetaminophen (PERCOCET)  mg per tablet  Take 1 tablet by mouth every 6 (six) hours as needed for Pain.             pantoprazole (PROTONIX) 40 MG tablet  Take 1 tablet (40 mg total) by mouth once daily.             patiromer calcium sorbitex (VELTASSA) 8.4 gram PwPk  Take 8.4 g by mouth once daily.             pravastatin (PRAVACHOL) 40 MG tablet  Take 40 mg by mouth once daily.             sevelamer carbonate (RENVELA) 800 mg Tab  Take 2 tablets (1,600 mg total) by mouth 3 (three) times daily with meals.             triamcinolone acetonide 0.1% (KENALOG) 0.1 % cream  Apply topically once daily.

## 2018-11-23 NOTE — SUBJECTIVE & OBJECTIVE
Interval History: right arm dressed, currently denies pain in right armReview of Systems   Constitutional: Negative for chills and fever.   HENT: Negative for trouble swallowing.    Respiratory: Negative for cough and shortness of breath.    Cardiovascular: Negative for chest pain and leg swelling.   Gastrointestinal: Negative for abdominal pain, diarrhea and vomiting.   Genitourinary: Negative for dysuria and hematuria.   Musculoskeletal:        Right arm swelling   Skin: Negative for rash.   Neurological: Negative for numbness and headaches.   Psychiatric/Behavioral: Negative for confusion.     Objective:     Vital Signs (Most Recent):  Temp: 97.7 °F (36.5 °C) (11/23/18 1200)  Pulse: 92 (11/23/18 1200)  Resp: 18 (11/23/18 1200)  BP: 118/78 (11/23/18 1200)  SpO2: 96 % (11/23/18 0731) Vital Signs (24h Range):  Temp:  [97.1 °F (36.2 °C)-98.3 °F (36.8 °C)] 97.7 °F (36.5 °C)  Pulse:  [] 92  Resp:  [15-18] 18  SpO2:  [95 %-96 %] 96 %  BP: ()/(53-98) 118/78     Weight: 71.7 kg (158 lb 1.1 oz)  Body mass index is 25.51 kg/m².    Intake/Output Summary (Last 24 hours) at 11/23/2018 1326  Last data filed at 11/23/2018 1151  Gross per 24 hour   Intake --   Output 2214 ml   Net -2214 ml      Physical Exam   Constitutional: He is oriented to person, place, and time. No distress.   HENT:   Head: Normocephalic and atraumatic.   Eyes: EOM are normal. Pupils are equal, round, and reactive to light.   Neck: Normal range of motion. Neck supple.   Cardiovascular: Normal rate and regular rhythm.   Pulmonary/Chest: Effort normal and breath sounds normal. No respiratory distress.   Abdominal: Soft. Bowel sounds are normal. He exhibits no distension. There is no tenderness.   Musculoskeletal:   Right upper arm dressed with drain, forearm swelling better    Neurological: He is alert and oriented to person, place, and time. No cranial nerve deficit.   Skin: Skin is warm.   Psychiatric: He has a normal mood and affect.        Significant Labs:   CBC:   Recent Labs   Lab 11/22/18  0453 11/23/18  0457   WBC 4.77 5.41   HGB 8.9* 8.8*   HCT 28.4* 28.4*    192     CMP:   Recent Labs   Lab 11/22/18 0453 11/23/18  0456   * 130*   K 4.5 5.2*   CL 96 95   CO2 30* 21*   GLU 89 81   BUN 32* 48*   CREATININE 8.8* 11.5*   CALCIUM 8.8 8.9   ANIONGAP 6* 14   EGFRNONAA 6* 5*       Significant Imaging: I have reviewed all pertinent imaging results/findings within the past 24 hours.

## 2018-11-23 NOTE — PROGRESS NOTES
"[facility] Progress Note    Admit Date: 11/16/2018   LOS: 2 days     SUBJECTIVE:     Follow-up For:  ESRD evaluation and management    Overnight Events:  Did well overnight with no acute events.  Otherwise denies recent CP, SOB, palp, abd pain, n/v/d, LE swelling.  Denies appetite disturbances or significant fatigue.  He will need antibiotics upon discharge.  He also feels one of the sutures "popped" overnight, which he was unable to show Dr. Apple on his rounds.    Scheduled Meds:   sodium chloride 0.9%   Intravenous Once    amLODIPine  10 mg Oral Daily    dapsone  100 mg Oral Daily    darunavir-cobicistat  1 tablet Oral Daily    dolutegravir  50 mg Oral Daily    epoetin lisa (PROCRIT) injection  10,000 Units Intravenous Every Mon, Wed, Fri    FLUoxetine  40 mg Oral Daily    heparin (porcine)  5,000 Units Subcutaneous Q12H    levETIRAcetam  500 mg Oral BID    pantoprazole  40 mg Oral Daily    sevelamer carbonate  1,600 mg Oral TID WM    vancomycin (VANCOCIN) IVPB  500 mg Intravenous Once in dialysis     Continuous Infusions:  PRN Meds:sodium chloride, sodium chloride 0.9%, sodium chloride 0.9%, dextrose 50%, dextrose 50%, fentaNYL, glucagon (human recombinant), glucose, glucose, heparin (porcine), morphine, ondansetron, oxyCODONE, oxyCODONE-acetaminophen, promethazine (PHENERGAN) IVPB, ramelteon, sodium chloride 0.9%    Review of patient's allergies indicates:   Allergen Reactions    Pcn [penicillins] Itching       Review of Systems  10 point ROS done and is neg besides what is listed above in HPI    OBJECTIVE:     Vital Signs (Most Recent)  Temp: 97.7 °F (36.5 °C) (11/23/18 1200)  Pulse: 88 (11/23/18 1400)  Resp: 18 (11/23/18 1200)  BP: 118/78 (11/23/18 1200)  SpO2: 96 % (11/23/18 0731)    Vital Signs Range (Last 24H):  Temp:  [97.1 °F (36.2 °C)-98.3 °F (36.8 °C)]   Pulse:  []   Resp:  [16-18]   BP: (100-167)/(62-98)   SpO2:  [95 %-96 %]     I & O (Last 24H):    Intake/Output Summary (Last " 24 hours) at 11/23/2018 1554  Last data filed at 11/23/2018 1151  Gross per 24 hour   Intake --   Output 2204 ml   Net -2204 ml     Physical Exam:  GEN:  appears well, comfortably sitting up in bed, in no acute distress  HEENT/NECK:  anicteric sclera, MMM, no apparent JVD  CVS:  RRR with no murmurs or rubs  PULM:  CTAB  ABD:  soft, nontender, nondistended, normal bowel sounds  EXTR:  no edema  SKIN:  no rashes or lesions, warm and not diaphoretic  ACCESS:  RUE AVF, wrapped in ACE.  L internal jugular TDC, clean    Laboratory:  CBC:   Recent Labs   Lab 11/23/18  0457   WBC 5.41   RBC 3.16*   HGB 8.8*   HCT 28.4*      MCV 90   MCH 27.8   MCHC 31.0*     CMP:   Recent Labs   Lab 11/23/18  0456   GLU 81   CALCIUM 8.9   *   K 5.2*   CO2 21*   CL 95   BUN 48*   CREATININE 11.5*       Diagnostic Results:  Labs: Reviewed    ASSESSMENT/PLAN:     52 year old M with h/o ESRD (MWF at [facility] under my care), HTN, seizure d/o, HIV who presents with swelling in his RUE, 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 Apple, doing much better.     1. ESRD - as above  - will dialyze thrice weekly unless more urgent indications arise  - strict I/O, daily weights  - avoid gadolinium, fleets, other phos-based laxatives, NSAIDs, aminoglycosides, IV contrast only if necessary     2. HTN  - currently well controlled     3. Anemia ESRD and recent blood loss  - cont to monitor, Hgb stable  - EPO with HD     4. MBD of ESRD  - binders with meals     5. Seizure d/o  - keppra dose after HD  - he was advised to limit his fluid intake so that we would be less aggressive on UF         James Torres MD

## 2018-11-23 NOTE — PROGRESS NOTES
[facility] Hospital Medicine  Progress Note    Patient Name: Greyson Hudson III  MRN: [id number]  Patient Class: IP- Inpatient   Admission Date: 11/16/2018  Length of Stay: 2 days  Attending Physician: Audrey Rider MD  Primary Care Provider: James Torres MD        Subjective:     Principal Problem:Mechanical complication of arteriovenous fistula surgically created    HPI:  52 year old male with past medical history of htn, esrd on hd m/w/f, seizure disorder had av graft placement in right arm about 2 weeks ago.He came in c/o pain in right arm , more worse today.He went to HD today but did not do it as he was hurting in his arm.He denies fever, chills, nausea, vomiting.Says cannot move his arm much.He did not fever or wbc in er.Was given a dose of vanc and gentamycin.Patient had a left side tunneled catheter for hd.He aster said he ran out of his pain meds.    Hospital Course:  Still c/o pain and swelling in right arm, no fever, cx negative so far.Got HD on 11/17/18.US non vascular showed 11 cm hypoechoic avascular lesion in right axillary region that could reflect abscess, hematoma, soft tissue mass.Patients pain was  better and able to move arm better on 11/18/18.Last evening , he developed more swelling of the arm and worsened pain from earlier.He had doppler radial  pulse and was given iv dilaudid for pain.Discussed with Dr Apple 11/19 , he felt a pulsating mass in right upper axilla, and patient will need us to r/o pseudoaneurysm.His us showed right axillary artery pseudoaneurysm and sx was planned the next morning but the patient started bleeding from the aneurysm and had to be taken to sx at night.He had repair of pseudoaneurysm right brachial artery with gore alexia interposition graft and removal of gore alexia av fistula.He received 4 units of blood and stable h/h.    Interval History: right arm dressed, currently denies pain in right armReview of Systems   Constitutional: Negative for chills  and fever.   HENT: Negative for trouble swallowing.    Respiratory: Negative for cough and shortness of breath.    Cardiovascular: Negative for chest pain and leg swelling.   Gastrointestinal: Negative for abdominal pain, diarrhea and vomiting.   Genitourinary: Negative for dysuria and hematuria.   Musculoskeletal:        Right arm swelling   Skin: Negative for rash.   Neurological: Negative for numbness and headaches.   Psychiatric/Behavioral: Negative for confusion.     Objective:     Vital Signs (Most Recent):  Temp: 97.7 °F (36.5 °C) (11/23/18 1200)  Pulse: 92 (11/23/18 1200)  Resp: 18 (11/23/18 1200)  BP: 118/78 (11/23/18 1200)  SpO2: 96 % (11/23/18 0731) Vital Signs (24h Range):  Temp:  [97.1 °F (36.2 °C)-98.3 °F (36.8 °C)] 97.7 °F (36.5 °C)  Pulse:  [] 92  Resp:  [15-18] 18  SpO2:  [95 %-96 %] 96 %  BP: ()/(53-98) 118/78     Weight: 71.7 kg (158 lb 1.1 oz)  Body mass index is 25.51 kg/m².    Intake/Output Summary (Last 24 hours) at 11/23/2018 1326  Last data filed at 11/23/2018 1151  Gross per 24 hour   Intake --   Output 2214 ml   Net -2214 ml      Physical Exam   Constitutional: He is oriented to person, place, and time. No distress.   HENT:   Head: Normocephalic and atraumatic.   Eyes: EOM are normal. Pupils are equal, round, and reactive to light.   Neck: Normal range of motion. Neck supple.   Cardiovascular: Normal rate and regular rhythm.   Pulmonary/Chest: Effort normal and breath sounds normal. No respiratory distress.   Abdominal: Soft. Bowel sounds are normal. He exhibits no distension. There is no tenderness.   Musculoskeletal:   Right upper arm dressed with drain, forearm swelling better    Neurological: He is alert and oriented to person, place, and time. No cranial nerve deficit.   Skin: Skin is warm.   Psychiatric: He has a normal mood and affect.       Significant Labs:   CBC:   Recent Labs   Lab 11/22/18  0453 11/23/18  0457   WBC 4.77 5.41   HGB 8.9* 8.8*   HCT 28.4* 28.4*   PLT  152 192     CMP:   Recent Labs   Lab 11/22/18  0453 11/23/18  0456   * 130*   K 4.5 5.2*   CL 96 95   CO2 30* 21*   GLU 89 81   BUN 32* 48*   CREATININE 8.8* 11.5*   CALCIUM 8.8 8.9   ANIONGAP 6* 14   EGFRNONAA 6* 5*       Significant Imaging: I have reviewed all pertinent imaging results/findings within the past 24 hours.    Assessment/Plan:      * Mechanical complication of arteriovenous fistula surgically created      Us non vascular shows hematoma /abscess  Blood cx likely contaminant, repeat blood cx negative   On vanc and gentamycin with hd , s/p iv gent 11/19 as post hd  level was on lower side   Us showed pseudoaneurysm of right brachial artery  S/p repair and removal of av fistula  appr sx help  Will continue vanc , gent  due to emergent sx, talked to dr coombs , wants to continue for 2 weeks with hd.  S/p last vanc and gent on11/21  -vanc level 18 before HD, will give 500 mg vanc after HD   - CHECK gent level 2 hours after HD   -  Prn pain meds         Acute blood loss anemia    Has chronic anemia with esrd but had acute blood loss anemia with bleeding  S/p 4 unit prbc  Monitor h/h, STABLE        Bacteremia      Blood cx grew coagulase negative staph in 1 bottle, likely contaminant.  Repeat blood cx negative     Hyperkalemia    Mild hyperkalemia  S/P HD 11/22  MONITOR LABS       Seizure disorder    Continue home meds of keppra.       Anemia in ESRD (end-stage renal disease)    On procrit with hd       ESRD (end stage renal disease) on dialysis    Patient missed hd 11/16  S/p hd 11/17, 11/19 , 11/21  Nephrology following       HIV disease    Last cd4 count in 01/18 was 77  Patient on prezcobix and tivicay at home  Pharmacy was not able to get it  Patients  brought it  Continue dapsone daily         Essential hypertension    Low today  Hold meds for hypotension       VTE Risk Mitigation (From admission, onward)        Ordered     heparin (porcine) injection 5,000 Units  As needed (PRN)       11/17/18 1111     IP VTE HIGH RISK PATIENT  Once      11/16/18 1644     Place sequential compression device  Until discontinued      11/16/18 1644              Audrey Rider MD  Department of Hospital Medicine   [facility]

## 2018-11-24 PROBLEM — R78.81 BACTEREMIA: Status: RESOLVED | Noted: 2018-11-18 | Resolved: 2018-11-24

## 2018-11-24 LAB
ANION GAP SERPL CALC-SCNC: 11 MMOL/L
BUN SERPL-MCNC: 35 MG/DL
CALCIUM SERPL-MCNC: 9.2 MG/DL
CHLORIDE SERPL-SCNC: 96 MMOL/L
CO2 SERPL-SCNC: 25 MMOL/L
CREAT SERPL-MCNC: 8.4 MG/DL
EST. GFR  (AFRICAN AMERICAN): 8 ML/MIN/1.73 M^2
EST. GFR  (NON AFRICAN AMERICAN): 7 ML/MIN/1.73 M^2
GLUCOSE SERPL-MCNC: 104 MG/DL
POTASSIUM SERPL-SCNC: 4.5 MMOL/L
SODIUM SERPL-SCNC: 132 MMOL/L

## 2018-11-24 PROCEDURE — 80048 BASIC METABOLIC PNL TOTAL CA: CPT

## 2018-11-24 PROCEDURE — 99233 SBSQ HOSP IP/OBS HIGH 50: CPT | Mod: ,,, | Performed by: INTERNAL MEDICINE

## 2018-11-24 PROCEDURE — 11000001 HC ACUTE MED/SURG PRIVATE ROOM

## 2018-11-24 PROCEDURE — 36415 COLL VENOUS BLD VENIPUNCTURE: CPT

## 2018-11-24 PROCEDURE — 25000003 PHARM REV CODE 250: Performed by: NURSE PRACTITIONER

## 2018-11-24 PROCEDURE — 25000003 PHARM REV CODE 250: Performed by: INTERNAL MEDICINE

## 2018-11-24 PROCEDURE — 63600175 PHARM REV CODE 636 W HCPCS: Performed by: INTERNAL MEDICINE

## 2018-11-24 RX ORDER — ONDANSETRON 4 MG/1
4 TABLET, ORALLY DISINTEGRATING ORAL EVERY 6 HOURS PRN
Status: DISCONTINUED | OUTPATIENT
Start: 2018-11-24 | End: 2018-11-27 | Stop reason: HOSPADM

## 2018-11-24 RX ADMIN — PANTOPRAZOLE SODIUM 40 MG: 40 TABLET, DELAYED RELEASE ORAL at 09:11

## 2018-11-24 RX ADMIN — DAPSONE 100 MG: 25 TABLET ORAL at 10:11

## 2018-11-24 RX ADMIN — ONDANSETRON 4 MG: 4 TABLET, ORALLY DISINTEGRATING ORAL at 10:11

## 2018-11-24 RX ADMIN — DOLUTEGRAVIR SODIUM 50 MG: 50 TABLET, FILM COATED ORAL at 09:11

## 2018-11-24 RX ADMIN — OXYCODONE HYDROCHLORIDE AND ACETAMINOPHEN 1 TABLET: 10; 325 TABLET ORAL at 11:11

## 2018-11-24 RX ADMIN — SEVELAMER CARBONATE 1600 MG: 800 TABLET, FILM COATED ORAL at 09:11

## 2018-11-24 RX ADMIN — HEPARIN SODIUM 5000 UNITS: 5000 INJECTION, SOLUTION INTRAVENOUS; SUBCUTANEOUS at 08:11

## 2018-11-24 RX ADMIN — LEVETIRACETAM 500 MG: 500 TABLET, FILM COATED ORAL at 08:11

## 2018-11-24 RX ADMIN — SEVELAMER CARBONATE 1600 MG: 800 TABLET, FILM COATED ORAL at 12:11

## 2018-11-24 RX ADMIN — HEPARIN SODIUM 5000 UNITS: 5000 INJECTION, SOLUTION INTRAVENOUS; SUBCUTANEOUS at 09:11

## 2018-11-24 RX ADMIN — OXYCODONE HYDROCHLORIDE AND ACETAMINOPHEN 1 TABLET: 10; 325 TABLET ORAL at 08:11

## 2018-11-24 RX ADMIN — AMLODIPINE BESYLATE 10 MG: 5 TABLET ORAL at 09:11

## 2018-11-24 RX ADMIN — OXYCODONE HYDROCHLORIDE AND ACETAMINOPHEN 1 TABLET: 10; 325 TABLET ORAL at 05:11

## 2018-11-24 RX ADMIN — FLUOXETINE 40 MG: 20 CAPSULE ORAL at 09:11

## 2018-11-24 RX ADMIN — LEVETIRACETAM 500 MG: 500 TABLET, FILM COATED ORAL at 09:11

## 2018-11-24 RX ADMIN — SEVELAMER CARBONATE 1600 MG: 800 TABLET, FILM COATED ORAL at 05:11

## 2018-11-24 NOTE — PROGRESS NOTES
[facility] Progress Note    Admit Date: 11/16/2018   LOS: 3 days     SUBJECTIVE:     Follow-up For:  ESRD evaluation and management    Overnight Events:  Did well overnight with no acute events.  He believes his RUE has improved in terms of swelling and pain.  He had a friend of his deliver lots of candy to him.    Otherwise denies recent CP, SOB, palp, abd pain, n/v/d, LE swelling.  Denies appetite disturbances or significant fatigue.    Scheduled Meds:   sodium chloride 0.9%   Intravenous Once    amLODIPine  10 mg Oral Daily    dapsone  100 mg Oral Daily    darunavir-cobicistat  1 tablet Oral Daily    dolutegravir  50 mg Oral Daily    epoetin lisa (PROCRIT) injection  10,000 Units Intravenous Every Mon, Wed, Fri    FLUoxetine  40 mg Oral Daily    heparin (porcine)  5,000 Units Subcutaneous Q12H    levETIRAcetam  500 mg Oral BID    pantoprazole  40 mg Oral Daily    sevelamer carbonate  1,600 mg Oral TID WM     Continuous Infusions:  PRN Meds:sodium chloride, sodium chloride 0.9%, sodium chloride 0.9%, dextrose 50%, dextrose 50%, glucagon (human recombinant), glucose, glucose, morphine, oxyCODONE-acetaminophen, ramelteon, sodium chloride 0.9%    Review of patient's allergies indicates:   Allergen Reactions    Pcn [penicillins] Itching       Review of Systems  10 point ROS done and is neg besides what is listed above in HPI    OBJECTIVE:     Vital Signs (Most Recent)  Temp: 98.7 °F (37.1 °C) (11/24/18 1107)  Pulse: 79 (11/24/18 1400)  Resp: 18 (11/24/18 1107)  BP: 132/75 (11/24/18 1107)  SpO2: (!) 93 % (11/24/18 1107)    Vital Signs Range (Last 24H):  Temp:  [96.8 °F (36 °C)-98.7 °F (37.1 °C)]   Pulse:  [78-88]   Resp:  [18-20]   BP: (117-133)/(70-79)   SpO2:  [93 %-99 %]     I & O (Last 24H):    Intake/Output Summary (Last 24 hours) at 11/24/2018 1520  Last data filed at 11/24/2018 0600  Gross per 24 hour   Intake --   Output 5 ml   Net -5 ml     Physical Exam:  GEN:  appears well, comfortably  sitting up in bed, in no acute distress  HEENT/NECK:  anicteric sclera, MMM, no apparent JVD  CVS:  RRR with no murmurs or rubs  PULM:  CTAB  ABD:  soft, nontender, nondistended, normal bowel sounds  EXTR:  no edema  SKIN:  no rashes or lesions, warm and not diaphoretic  ACCESS:  RUE wrapped in ACE, L internal jugular TDC clean and without erythema/exudate    Laboratory:  CBC:   Recent Labs   Lab 11/23/18  0457   WBC 5.41   RBC 3.16*   HGB 8.8*   HCT 28.4*      MCV 90   MCH 27.8   MCHC 31.0*     CMP:   Recent Labs   Lab 11/24/18  0515      CALCIUM 9.2   *   K 4.5   CO2 25   CL 96   BUN 35*   CREATININE 8.4*       Diagnostic Results:  Labs: Reviewed    ASSESSMENT/PLAN:     52 year old M with h/o ESRD (MWF at [facility] under my care), HTN, seizure d/o, HIV who presents with swelling in his RUE, 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 Apple, doing much better.     1. ESRD - as above  - will dialyze thrice weekly unless more urgent indications arise  - strict I/O, daily weights  - avoid gadolinium, fleets, other phos-based laxatives, NSAIDs, aminoglycosides, IV contrast only if necessary     2. HTN  - currently well controlled     3. Anemia ESRD and recent blood loss  - cont to monitor, Hgb stable  - EPO with HD     4. MBD of ESRD  - binders with meals     5. Seizure d/o  - keppra dose after HD  - he was advised to limit his fluid intake so that we would be less aggressive on UF         James Torres MD

## 2018-11-24 NOTE — PLAN OF CARE
Problem: Pain, Acute (Adult)  Goal: Identify Related Risk Factors and Signs and Symptoms  Related risk factors and signs and symptoms are identified upon initiation of Human Response Clinical Practice Guideline (CPG)  Outcome: Ongoing (interventions implemented as appropriate)  Went to dialysis, 1.5L removed. RUE dressing changed, per Dr. Apple. Ambulates independently to bathroom. VSS stable on RA and afebrile. Positions self independently. Pain controlled with PO PRN meds. Tolerating ordered diet. IV site WNL. Plan of care reviewed with patient and all questions answered. Bed low, locked w/ bed alarm on. Call light within reach. Purposeful rounding performed. No other complaints at this time.

## 2018-11-24 NOTE — ASSESSMENT & PLAN NOTE
Us non vascular shows hematoma /abscess  Blood cx likely contaminant, repeat blood cx negative   On vanc and gentamycin with hd , s/p iv gent 11/19 as post hd  level was on lower side   Us showed pseudoaneurysm of right brachial artery  S/p repair and removal of av fistula  appr sx help  Will continue vanc , gent  due to emergent sx, talked to dr coombs , wants to continue for 2 weeks with hd.  S/p last vanc and gent on11/21  -vanc level 18 before HD, will give 500 mg vanc after HD   - gent level 2 hours after HD 1.5, jonathan check with next HD    -  Prn pain meds

## 2018-11-24 NOTE — NURSING
Dr Apple at bed side, changed dressing to RU arm fistula, informed pt that dressing and arm needs to clean and dry. Dr Apple to change dressing daily. Pt tolerated well. Wound with staples and sutures, with approximately 2 centimeter opening to sutuered area, Dr apple aware

## 2018-11-24 NOTE — PLAN OF CARE
Problem: Patient Care Overview  Goal: Plan of Care Review  Outcome: Ongoing (interventions implemented as appropriate)  Patient alert and oriented x 3. Right arm fistula, dressing in place around arm, with no drainage present. RYLEE drain to site output serosanguinous. Left internal juglar in place, dressing dry clean and intact. Ambulates independently, fall precautions in place, no falls or injury sustained during shift. Vitals stable. Purposeful hourly rounding conducted, will continue to monitor.

## 2018-11-24 NOTE — SUBJECTIVE & OBJECTIVE
Interval History: currently denies pain in right armReview of Systems   Constitutional: Negative for chills and fever.   HENT: Negative for trouble swallowing.    Respiratory: Negative for cough and shortness of breath.    Cardiovascular: Negative for chest pain and leg swelling.   Gastrointestinal: Negative for abdominal pain, diarrhea and vomiting.   Genitourinary: Negative for dysuria and hematuria.   Musculoskeletal:        Right arm swelling   Skin: Negative for rash.   Neurological: Negative for numbness and headaches.   Psychiatric/Behavioral: Negative for confusion.     Objective:     Vital Signs (Most Recent):  Temp: 98.7 °F (37.1 °C) (11/24/18 1107)  Pulse: 84 (11/24/18 1200)  Resp: 18 (11/24/18 1107)  BP: 132/75 (11/24/18 1107)  SpO2: (!) 93 % (11/24/18 1107) Vital Signs (24h Range):  Temp:  [96.8 °F (36 °C)-98.7 °F (37.1 °C)] 98.7 °F (37.1 °C)  Pulse:  [78-96] 84  Resp:  [18-20] 18  SpO2:  [93 %-99 %] 93 %  BP: (109-133)/(70-79) 132/75     Weight: 71.7 kg (158 lb 1.1 oz)  Body mass index is 25.51 kg/m².    Intake/Output Summary (Last 24 hours) at 11/24/2018 1245  Last data filed at 11/24/2018 0600  Gross per 24 hour   Intake --   Output 5 ml   Net -5 ml      Physical Exam   Constitutional: He is oriented to person, place, and time. No distress.   HENT:   Head: Normocephalic and atraumatic.   Eyes: EOM are normal. Pupils are equal, round, and reactive to light.   Neck: Normal range of motion. Neck supple.   Cardiovascular: Normal rate and regular rhythm.   Pulmonary/Chest: Effort normal and breath sounds normal. No respiratory distress.   Abdominal: Soft. Bowel sounds are normal. He exhibits no distension. There is no tenderness.   Musculoskeletal:   Right upper arm dressed with drain, forearm swelling better    Neurological: He is alert and oriented to person, place, and time. No cranial nerve deficit.   Skin: Skin is warm.   Psychiatric: He has a normal mood and affect.       Significant Labs:   CBC:    Recent Labs   Lab 11/23/18 0457   WBC 5.41   HGB 8.8*   HCT 28.4*        CMP:   Recent Labs   Lab 11/23/18  0456 11/24/18  0515   * 132*   K 5.2* 4.5   CL 95 96   CO2 21* 25   GLU 81 104   BUN 48* 35*   CREATININE 11.5* 8.4*   CALCIUM 8.9 9.2   ANIONGAP 14 11   EGFRNONAA 5* 7*       Significant Imaging: I have reviewed all pertinent imaging results/findings within the past 24 hours.

## 2018-11-24 NOTE — PROGRESS NOTES
[facility] Hospital Medicine  Progress Note    Patient Name: Greyson Hudson III  MRN: [id number]  Patient Class: IP- Inpatient   Admission Date: 11/16/2018  Length of Stay: 3 days  Attending Physician: Audrey Rider MD  Primary Care Provider: James Torres MD        Subjective:     Principal Problem:Mechanical complication of arteriovenous fistula surgically created    HPI:  52 year old male with past medical history of htn, esrd on hd m/w/f, seizure disorder had av graft placement in right arm about 2 weeks ago.He came in c/o pain in right arm , more worse today.He went to HD today but did not do it as he was hurting in his arm.He denies fever, chills, nausea, vomiting.Says cannot move his arm much.He did not fever or wbc in er.Was given a dose of vanc and gentamycin.Patient had a left side tunneled catheter for hd.He aster said he ran out of his pain meds.    Hospital Course:  Still c/o pain and swelling in right arm, no fever, cx negative so far.Got HD on 11/17/18.US non vascular showed 11 cm hypoechoic avascular lesion in right axillary region that could reflect abscess, hematoma, soft tissue mass.Patients pain was  better and able to move arm better on 11/18/18.Last evening , he developed more swelling of the arm and worsened pain from earlier.He had doppler radial  pulse and was given iv dilaudid for pain.Discussed with Dr Apple 11/19 , he felt a pulsating mass in right upper axilla, and patient will need us to r/o pseudoaneurysm.His us showed right axillary artery pseudoaneurysm and sx was planned the next morning but the patient started bleeding from the aneurysm and had to be taken to sx at night.He had repair of pseudoaneurysm right brachial artery with gore alexia interposition graft and removal of gore alexia av fistula.He received 4 units of blood and stable h/h.Sx recommended to continue antibiotics for 2 weeks post surgery.    Interval History: currently denies pain in right armReview of  Systems   Constitutional: Negative for chills and fever.   HENT: Negative for trouble swallowing.    Respiratory: Negative for cough and shortness of breath.    Cardiovascular: Negative for chest pain and leg swelling.   Gastrointestinal: Negative for abdominal pain, diarrhea and vomiting.   Genitourinary: Negative for dysuria and hematuria.   Musculoskeletal:        Right arm swelling   Skin: Negative for rash.   Neurological: Negative for numbness and headaches.   Psychiatric/Behavioral: Negative for confusion.     Objective:     Vital Signs (Most Recent):  Temp: 98.7 °F (37.1 °C) (11/24/18 1107)  Pulse: 84 (11/24/18 1200)  Resp: 18 (11/24/18 1107)  BP: 132/75 (11/24/18 1107)  SpO2: (!) 93 % (11/24/18 1107) Vital Signs (24h Range):  Temp:  [96.8 °F (36 °C)-98.7 °F (37.1 °C)] 98.7 °F (37.1 °C)  Pulse:  [78-96] 84  Resp:  [18-20] 18  SpO2:  [93 %-99 %] 93 %  BP: (109-133)/(70-79) 132/75     Weight: 71.7 kg (158 lb 1.1 oz)  Body mass index is 25.51 kg/m².    Intake/Output Summary (Last 24 hours) at 11/24/2018 1245  Last data filed at 11/24/2018 0600  Gross per 24 hour   Intake --   Output 5 ml   Net -5 ml      Physical Exam   Constitutional: He is oriented to person, place, and time. No distress.   HENT:   Head: Normocephalic and atraumatic.   Eyes: EOM are normal. Pupils are equal, round, and reactive to light.   Neck: Normal range of motion. Neck supple.   Cardiovascular: Normal rate and regular rhythm.   Pulmonary/Chest: Effort normal and breath sounds normal. No respiratory distress.   Abdominal: Soft. Bowel sounds are normal. He exhibits no distension. There is no tenderness.   Musculoskeletal:   Right upper arm dressed with drain, forearm swelling better    Neurological: He is alert and oriented to person, place, and time. No cranial nerve deficit.   Skin: Skin is warm.   Psychiatric: He has a normal mood and affect.       Significant Labs:   CBC:   Recent Labs   Lab 11/23/18  0457   WBC 5.41   HGB 8.8*   HCT  28.4*        CMP:   Recent Labs   Lab 11/23/18  0456 11/24/18  0515   * 132*   K 5.2* 4.5   CL 95 96   CO2 21* 25   GLU 81 104   BUN 48* 35*   CREATININE 11.5* 8.4*   CALCIUM 8.9 9.2   ANIONGAP 14 11   EGFRNONAA 5* 7*       Significant Imaging: I have reviewed all pertinent imaging results/findings within the past 24 hours.    Assessment/Plan:      * Mechanical complication of arteriovenous fistula surgically created      Us non vascular shows hematoma /abscess  Blood cx likely contaminant, repeat blood cx negative   On vanc and gentamycin with hd , s/p iv gent 11/19 as post hd  level was on lower side   Us showed pseudoaneurysm of right brachial artery  S/p repair and removal of av fistula  appr sx help  Will continue vanc , gent  due to emergent sx, talked to dr coombs , wants to continue for 2 weeks with hd.  S/p last vanc and gent on11/21  -vanc level 18 before HD, will give 500 mg vanc after HD   - gent level 2 hours after HD 1.5, jonathan check with next HD    -  Prn pain meds         Acute blood loss anemia    Has chronic anemia with esrd but had acute blood loss anemia with bleeding  S/p 4 unit prbc  Monitor h/h, STABLE        Hyperkalemia    Mild hyperkalemia  Improved, S/P HD 11/22  MONITOR LABS       Seizure disorder    Continue home meds of keppra.       Anemia in ESRD (end-stage renal disease)    On procrit with hd       ESRD (end stage renal disease) on dialysis    Patient missed hd 11/16  S/p hd 11/17, 11/19 , 11/21  Nephrology following       HIV disease    Last cd4 count in 01/18 was 77  Patient on prezcobix and tivicay at home  Pharmacy was not able to get it  Patients  brought it  Continue dapsone daily         Essential hypertension    Low today  Hold meds for hypotension       VTE Risk Mitigation (From admission, onward)        Ordered     heparin (porcine) injection 5,000 Units  Every 12 hours      11/23/18 1439     heparin (porcine) injection 5,000 Units  As needed (PRN)       11/17/18 1111     IP VTE HIGH RISK PATIENT  Once      11/16/18 1644     Place sequential compression device  Until discontinued      11/16/18 1644              Audrey Rider MD  Department of Hospital Medicine   [facility]

## 2018-11-25 LAB
BACTERIA BLD CULT: NORMAL
BACTERIA BLD CULT: NORMAL

## 2018-11-25 PROCEDURE — 63600175 PHARM REV CODE 636 W HCPCS: Performed by: INTERNAL MEDICINE

## 2018-11-25 PROCEDURE — 11000001 HC ACUTE MED/SURG PRIVATE ROOM

## 2018-11-25 PROCEDURE — 94761 N-INVAS EAR/PLS OXIMETRY MLT: CPT

## 2018-11-25 PROCEDURE — 99232 SBSQ HOSP IP/OBS MODERATE 35: CPT | Mod: ,,, | Performed by: INTERNAL MEDICINE

## 2018-11-25 PROCEDURE — 25000003 PHARM REV CODE 250: Performed by: INTERNAL MEDICINE

## 2018-11-25 RX ADMIN — HEPARIN SODIUM 5000 UNITS: 5000 INJECTION, SOLUTION INTRAVENOUS; SUBCUTANEOUS at 08:11

## 2018-11-25 RX ADMIN — SEVELAMER CARBONATE 1600 MG: 800 TABLET, FILM COATED ORAL at 11:11

## 2018-11-25 RX ADMIN — FLUOXETINE 40 MG: 20 CAPSULE ORAL at 08:11

## 2018-11-25 RX ADMIN — LEVETIRACETAM 500 MG: 500 TABLET, FILM COATED ORAL at 08:11

## 2018-11-25 RX ADMIN — DAPSONE 100 MG: 25 TABLET ORAL at 09:11

## 2018-11-25 RX ADMIN — PANTOPRAZOLE SODIUM 40 MG: 40 TABLET, DELAYED RELEASE ORAL at 08:11

## 2018-11-25 RX ADMIN — OXYCODONE HYDROCHLORIDE AND ACETAMINOPHEN 1 TABLET: 10; 325 TABLET ORAL at 08:11

## 2018-11-25 RX ADMIN — SEVELAMER CARBONATE 1600 MG: 800 TABLET, FILM COATED ORAL at 05:11

## 2018-11-25 RX ADMIN — OXYCODONE HYDROCHLORIDE AND ACETAMINOPHEN 1 TABLET: 10; 325 TABLET ORAL at 07:11

## 2018-11-25 RX ADMIN — OXYCODONE HYDROCHLORIDE AND ACETAMINOPHEN 1 TABLET: 10; 325 TABLET ORAL at 04:11

## 2018-11-25 RX ADMIN — DOLUTEGRAVIR SODIUM 50 MG: 50 TABLET, FILM COATED ORAL at 08:11

## 2018-11-25 RX ADMIN — SEVELAMER CARBONATE 1600 MG: 800 TABLET, FILM COATED ORAL at 08:11

## 2018-11-25 RX ADMIN — AMLODIPINE BESYLATE 10 MG: 5 TABLET ORAL at 08:11

## 2018-11-25 NOTE — PLAN OF CARE
Problem: Patient Care Overview  Goal: Plan of Care Review  Outcome: Ongoing (interventions implemented as appropriate)  Plan of care reviewed with patient and all questions answer. Pt remains free from fall, injury, and skin breakdown. Pt neurovascular checks are intact. Positions self independently. Up to toilet x 2 this shift. Drain care provided. DENISE fistula dressing intact. Pain controlled with PRN pain medication. Patient had one episode of nausea this shift, PRN medication was given and now pt states he feel better with no complaints of nausea at this time. Purposeful rounding done. Patient has call light within reach, bed brakes lock, side rails up x2, and bed in low position. Patient lying in bed in no distress. Will continue to monitor.

## 2018-11-25 NOTE — ASSESSMENT & PLAN NOTE
Us non vascular shows hematoma /abscess  Blood cx likely contaminant, repeat blood cx negative   On vanc and gentamycin with hd , s/p iv gent 11/19 as post hd  level was on lower side   Us showed pseudoaneurysm of right brachial artery  S/p repair and removal of av fistula  appr sx help  Will continue vanc , gent  due to emergent sx, talked to dr coombs , wants to continue for 2 weeks with hd.  S/p last vanc and gent on11/21  -vanc level 18 before HD, will give 500 mg vanc after HD   - gent level 2 hours after HD 1.5, jonathan check with next HD    -  Prn pain meds  - dressing changes per dr coombs

## 2018-11-25 NOTE — PROGRESS NOTES
[facility] Hospital Medicine  Progress Note    Patient Name: Greyson Hudson III  MRN: [id number]  Patient Class: IP- Inpatient   Admission Date: 11/16/2018  Length of Stay: 4 days  Attending Physician: Audrey Rider MD  Primary Care Provider: James Torres MD        Subjective:     Principal Problem:Mechanical complication of arteriovenous fistula surgically created    HPI:  52 year old male with past medical history of htn, esrd on hd m/w/f, seizure disorder had av graft placement in right arm about 2 weeks ago.He came in c/o pain in right arm , more worse today.He went to HD today but did not do it as he was hurting in his arm.He denies fever, chills, nausea, vomiting.Says cannot move his arm much.He did not fever or wbc in er.Was given a dose of vanc and gentamycin.Patient had a left side tunneled catheter for hd.He aster said he ran out of his pain meds.    Hospital Course:  Still c/o pain and swelling in right arm, no fever, cx negative so far.Got HD on 11/17/18.US non vascular showed 11 cm hypoechoic avascular lesion in right axillary region that could reflect abscess, hematoma, soft tissue mass.Patients pain was  better and able to move arm better on 11/18/18.Last evening , he developed more swelling of the arm and worsened pain from earlier.He had doppler radial  pulse and was given iv dilaudid for pain.Discussed with Dr Apple 11/19 , he felt a pulsating mass in right upper axilla, and patient will need us to r/o pseudoaneurysm.His us showed right axillary artery pseudoaneurysm and sx was planned the next morning but the patient started bleeding from the aneurysm and had to be taken to sx at night.He had repair of pseudoaneurysm right brachial artery with gore alexia interposition graft and removal of gore alexia av fistula.He received 4 units of blood and stable h/h.Sx recommended to continue antibiotics for 2 weeks post surgery.    Interval History:talked to dr apple and wound looks ok, he left  it open ,  currently denies pain in right armReview of Systems   Constitutional: Negative for chills and fever.   HENT: Negative for trouble swallowing.    Respiratory: Negative for cough and shortness of breath.    Cardiovascular: Negative for chest pain and leg swelling.   Gastrointestinal: Negative for abdominal pain, diarrhea and vomiting.   Genitourinary: Negative for dysuria and hematuria.   Musculoskeletal:        Right arm swelling   Skin: Negative for rash.   Neurological: Negative for numbness and headaches.   Psychiatric/Behavioral: Negative for confusion.     Objective:     Vital Signs (Most Recent):  Temp: 98.6 °F (37 °C) (11/25/18 1424)  Pulse: 78 (11/25/18 1424)  Resp: 18 (11/25/18 1424)  BP: 131/77 (11/25/18 1424)  SpO2: (!) 93 % (11/25/18 1424) Vital Signs (24h Range):  Temp:  [97.8 °F (36.6 °C)-98.6 °F (37 °C)] 98.6 °F (37 °C)  Pulse:  [74-92] 78  Resp:  [17-20] 18  SpO2:  [93 %-100 %] 93 %  BP: (126-140)/(77-80) 131/77     Weight: 71.7 kg (158 lb 1.1 oz)  Body mass index is 25.51 kg/m².    Intake/Output Summary (Last 24 hours) at 11/25/2018 1629  Last data filed at 11/25/2018 1424  Gross per 24 hour   Intake 960 ml   Output 15 ml   Net 945 ml      Physical Exam   Constitutional: He is oriented to person, place, and time. No distress.   HENT:   Head: Normocephalic and atraumatic.   Eyes: EOM are normal. Pupils are equal, round, and reactive to light.   Neck: Normal range of motion. Neck supple.   Cardiovascular: Normal rate and regular rhythm.   Pulmonary/Chest: Effort normal and breath sounds normal. No respiratory distress.   Abdominal: Soft. Bowel sounds are normal. He exhibits no distension. There is no tenderness.   Musculoskeletal:   Right upper arm dressed with drain, forearm swelling better    Neurological: He is alert and oriented to person, place, and time. No cranial nerve deficit.   Skin: Skin is warm.   Psychiatric: He has a normal mood and affect.       Significant Labs:   CBC:   No  results for input(s): WBC, HGB, HCT, PLT in the last 48 hours.  CMP:   Recent Labs   Lab 11/24/18  0515   *   K 4.5   CL 96   CO2 25      BUN 35*   CREATININE 8.4*   CALCIUM 9.2   ANIONGAP 11   EGFRNONAA 7*       Significant Imaging: I have reviewed all pertinent imaging results/findings within the past 24 hours.    Assessment/Plan:      * Mechanical complication of arteriovenous fistula surgically created      Us non vascular shows hematoma /abscess  Blood cx likely contaminant, repeat blood cx negative   On vanc and gentamycin with hd , s/p iv gent 11/19 as post hd  level was on lower side   Us showed pseudoaneurysm of right brachial artery  S/p repair and removal of av fistula  appr sx help  Will continue vanc , gent  due to emergent sx, talked to dr coombs , wants to continue for 2 weeks with hd.  S/p last vanc and gent on11/21  -vanc level 18 before HD, will give 500 mg vanc after HD   - gent level 2 hours after HD 1.5, jonathan check with next HD    -  Prn pain meds  - dressing changes per dr coombs         Acute blood loss anemia    Has chronic anemia with esrd but had acute blood loss anemia with bleeding  S/p 4 unit prbc  Monitor h/h, STABLE        Hyperkalemia    Mild hyperkalemia  Improved, S/P HD 11/22  MONITOR LABS       Seizure disorder    Continue home meds of keppra.       Anemia in ESRD (end-stage renal disease)    On procrit with hd       ESRD (end stage renal disease) on dialysis    Patient missed hd 11/16  S/p hd 11/17, 11/19 , 11/21  Nephrology following       HIV disease    Last cd4 count in 01/18 was 77  Patient on prezcobix and tivicay at home  Pharmacy was not able to get it  Patients  brought it  Continue dapsone daily         Essential hypertension    Low today  Hold meds for hypotension       VTE Risk Mitigation (From admission, onward)        Ordered     heparin (porcine) injection 5,000 Units  Every 12 hours      11/23/18 1439     heparin (porcine) injection 5,000  Units  As needed (PRN)      11/17/18 1111     IP VTE HIGH RISK PATIENT  Once      11/16/18 1644     Place sequential compression device  Until discontinued      11/16/18 1644              Audrey Rider MD  Department of Hospital Medicine   [facility]

## 2018-11-25 NOTE — PROGRESS NOTES
[facility]phrology Progress Note    Admit Date: 11/16/2018   LOS: 4 days     SUBJECTIVE:     Follow-up For:  ESRD evaluation and management    Overnight Events:  Did well overnight with no acute events.  Arm swelling and pain continues to improve.  Otherwise denies recent CP, SOB, palp, abd pain, n/v/d, LE swelling.  Denies appetite disturbances or significant fatigue.    Scheduled Meds:   sodium chloride 0.9%   Intravenous Once    amLODIPine  10 mg Oral Daily    dapsone  100 mg Oral Daily    darunavir-cobicistat  1 tablet Oral Daily    dolutegravir  50 mg Oral Daily    epoetin lisa (PROCRIT) injection  10,000 Units Intravenous Every Mon, Wed, Fri    FLUoxetine  40 mg Oral Daily    heparin (porcine)  5,000 Units Subcutaneous Q12H    levETIRAcetam  500 mg Oral BID    pantoprazole  40 mg Oral Daily    sevelamer carbonate  1,600 mg Oral TID WM     Continuous Infusions:  PRN Meds:sodium chloride, sodium chloride 0.9%, sodium chloride 0.9%, dextrose 50%, dextrose 50%, glucagon (human recombinant), glucose, glucose, morphine, ondansetron, oxyCODONE-acetaminophen, ramelteon, sodium chloride 0.9%    Review of patient's allergies indicates:   Allergen Reactions    Pcn [penicillins] Itching       Review of Systems  10 point ROS done and is neg besides what is listed above in HPI    OBJECTIVE:     Vital Signs (Most Recent)  Temp: 98.6 °F (37 °C) (11/25/18 1424)  Pulse: 78 (11/25/18 1424)  Resp: 18 (11/25/18 1424)  BP: 131/77 (11/25/18 1424)  SpO2: (!) 93 % (11/25/18 1424)    Vital Signs Range (Last 24H):  Temp:  [97.8 °F (36.6 °C)-98.6 °F (37 °C)]   Pulse:  [74-92]   Resp:  [17-20]   BP: (126-146)/(77-82)   SpO2:  [91 %-100 %]     I & O (Last 24H):    Intake/Output Summary (Last 24 hours) at 11/25/2018 1539  Last data filed at 11/25/2018 0436  Gross per 24 hour   Intake 960 ml   Output 10 ml   Net 950 ml     Physical Exam:  GEN:  appears well, comfortably sitting up in bed, in no acute distress  HEENT/NECK:  anicteric  sclera, MMM, no apparent JVD  CVS:  RRR with no murmurs or rubs  PULM:  CTAB  ABD:  soft, nontender, nondistended, normal bowel sounds  EXTR:  no edema  SKIN:  no rashes or lesions, warm and not diaphoretic  ACCESS:  L internal jugular TDC, clean    Laboratory:  CBC:   Recent Labs   Lab 11/23/18  0457   WBC 5.41   RBC 3.16*   HGB 8.8*   HCT 28.4*      MCV 90   MCH 27.8   MCHC 31.0*     CMP:   Recent Labs   Lab 11/24/18  0515      CALCIUM 9.2   *   K 4.5   CO2 25   CL 96   BUN 35*   CREATININE 8.4*       Diagnostic Results:  Labs: Reviewed    ASSESSMENT/PLAN:     52 year old M with h/o ESRD (MWF at [facility] under my care), HTN, seizure d/o, HIV who presents with swelling in his RUE, 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 Apple, doing much better.     1. ESRD - as above  - will dialyze thrice weekly unless more urgent indications arise  - strict I/O, daily weights  - avoid gadolinium, fleets, other phos-based laxatives, NSAIDs, aminoglycosides, IV contrast only if necessary     2. HTN  - currently well controlled     3. Anemia ESRD and recent blood loss  - cont to monitor, Hgb stable  - EPO with HD     4. MBD of ESRD  - binders with meals     5. Seizure d/o  - keppra dose after HD  - he was advised to limit his fluid intake so that we would be less aggressive on UF         James Torres MD

## 2018-11-25 NOTE — SUBJECTIVE & OBJECTIVE
Interval History:talked to dr coombs and wound looks ok, he left it open ,  currently denies pain in right armReview of Systems   Constitutional: Negative for chills and fever.   HENT: Negative for trouble swallowing.    Respiratory: Negative for cough and shortness of breath.    Cardiovascular: Negative for chest pain and leg swelling.   Gastrointestinal: Negative for abdominal pain, diarrhea and vomiting.   Genitourinary: Negative for dysuria and hematuria.   Musculoskeletal:        Right arm swelling   Skin: Negative for rash.   Neurological: Negative for numbness and headaches.   Psychiatric/Behavioral: Negative for confusion.     Objective:     Vital Signs (Most Recent):  Temp: 98.6 °F (37 °C) (11/25/18 1424)  Pulse: 78 (11/25/18 1424)  Resp: 18 (11/25/18 1424)  BP: 131/77 (11/25/18 1424)  SpO2: (!) 93 % (11/25/18 1424) Vital Signs (24h Range):  Temp:  [97.8 °F (36.6 °C)-98.6 °F (37 °C)] 98.6 °F (37 °C)  Pulse:  [74-92] 78  Resp:  [17-20] 18  SpO2:  [93 %-100 %] 93 %  BP: (126-140)/(77-80) 131/77     Weight: 71.7 kg (158 lb 1.1 oz)  Body mass index is 25.51 kg/m².    Intake/Output Summary (Last 24 hours) at 11/25/2018 1629  Last data filed at 11/25/2018 1424  Gross per 24 hour   Intake 960 ml   Output 15 ml   Net 945 ml      Physical Exam   Constitutional: He is oriented to person, place, and time. No distress.   HENT:   Head: Normocephalic and atraumatic.   Eyes: EOM are normal. Pupils are equal, round, and reactive to light.   Neck: Normal range of motion. Neck supple.   Cardiovascular: Normal rate and regular rhythm.   Pulmonary/Chest: Effort normal and breath sounds normal. No respiratory distress.   Abdominal: Soft. Bowel sounds are normal. He exhibits no distension. There is no tenderness.   Musculoskeletal:   Right upper arm dressed with drain, forearm swelling better    Neurological: He is alert and oriented to person, place, and time. No cranial nerve deficit.   Skin: Skin is warm.   Psychiatric: He has  a normal mood and affect.       Significant Labs:   CBC:   No results for input(s): WBC, HGB, HCT, PLT in the last 48 hours.  CMP:   Recent Labs   Lab 11/24/18  0515   *   K 4.5   CL 96   CO2 25      BUN 35*   CREATININE 8.4*   CALCIUM 9.2   ANIONGAP 11   EGFRNONAA 7*       Significant Imaging: I have reviewed all pertinent imaging results/findings within the past 24 hours.

## 2018-11-26 LAB
ANION GAP SERPL CALC-SCNC: 15 MMOL/L
BASOPHILS # BLD AUTO: 0.01 K/UL
BASOPHILS NFR BLD: 0.2 %
BUN SERPL-MCNC: 63 MG/DL
CALCIUM SERPL-MCNC: 9.1 MG/DL
CHLORIDE SERPL-SCNC: 95 MMOL/L
CO2 SERPL-SCNC: 21 MMOL/L
CREAT SERPL-MCNC: 12.8 MG/DL
DIFFERENTIAL METHOD: ABNORMAL
EOSINOPHIL # BLD AUTO: 0.2 K/UL
EOSINOPHIL NFR BLD: 5 %
ERYTHROCYTE [DISTWIDTH] IN BLOOD BY AUTOMATED COUNT: 15.2 %
EST. GFR  (AFRICAN AMERICAN): 5 ML/MIN/1.73 M^2
EST. GFR  (NON AFRICAN AMERICAN): 4 ML/MIN/1.73 M^2
GENTAMICIN SERPL-MCNC: 0.5 UG/ML
GLUCOSE SERPL-MCNC: 82 MG/DL
HCT VFR BLD AUTO: 26.5 %
HGB BLD-MCNC: 8.1 G/DL
LYMPHOCYTES # BLD AUTO: 1.3 K/UL
LYMPHOCYTES NFR BLD: 26.6 %
MCH RBC QN AUTO: 27.6 PG
MCHC RBC AUTO-ENTMCNC: 30.6 G/DL
MCV RBC AUTO: 90 FL
MONOCYTES # BLD AUTO: 0.5 K/UL
MONOCYTES NFR BLD: 11.1 %
NEUTROPHILS # BLD AUTO: 2.7 K/UL
NEUTROPHILS NFR BLD: 56.9 %
PLATELET # BLD AUTO: 240 K/UL
PMV BLD AUTO: 9.4 FL
POTASSIUM SERPL-SCNC: 5.4 MMOL/L
RBC # BLD AUTO: 2.94 M/UL
SODIUM SERPL-SCNC: 131 MMOL/L
VANCOMYCIN SERPL-MCNC: 19.8 UG/ML
WBC # BLD AUTO: 4.78 K/UL

## 2018-11-26 PROCEDURE — 63600175 PHARM REV CODE 636 W HCPCS: Performed by: INTERNAL MEDICINE

## 2018-11-26 PROCEDURE — 25000003 PHARM REV CODE 250: Performed by: INTERNAL MEDICINE

## 2018-11-26 PROCEDURE — 80202 ASSAY OF VANCOMYCIN: CPT

## 2018-11-26 PROCEDURE — 80048 BASIC METABOLIC PNL TOTAL CA: CPT

## 2018-11-26 PROCEDURE — 99232 SBSQ HOSP IP/OBS MODERATE 35: CPT | Mod: ,,, | Performed by: INTERNAL MEDICINE

## 2018-11-26 PROCEDURE — 85025 COMPLETE CBC W/AUTO DIFF WBC: CPT

## 2018-11-26 PROCEDURE — 11000001 HC ACUTE MED/SURG PRIVATE ROOM

## 2018-11-26 PROCEDURE — 36415 COLL VENOUS BLD VENIPUNCTURE: CPT

## 2018-11-26 PROCEDURE — 94761 N-INVAS EAR/PLS OXIMETRY MLT: CPT

## 2018-11-26 PROCEDURE — 80170 ASSAY OF GENTAMICIN: CPT

## 2018-11-26 PROCEDURE — 80100016 HC MAINTENANCE HEMODIALYSIS

## 2018-11-26 RX ORDER — OXYCODONE AND ACETAMINOPHEN 10; 325 MG/1; MG/1
1 TABLET ORAL EVERY 6 HOURS PRN
Qty: 25 TABLET | Refills: 0 | Status: ON HOLD | OUTPATIENT
Start: 2018-11-26 | End: 2019-04-30 | Stop reason: HOSPADM

## 2018-11-26 RX ORDER — HEPARIN SODIUM 5000 [USP'U]/ML
5000 INJECTION, SOLUTION INTRAVENOUS; SUBCUTANEOUS
Status: COMPLETED | OUTPATIENT
Start: 2018-11-26 | End: 2018-11-26

## 2018-11-26 RX ORDER — LEVETIRACETAM 500 MG/1
500 TABLET ORAL 2 TIMES DAILY
Qty: 60 TABLET | Refills: 0 | Status: ON HOLD | OUTPATIENT
Start: 2018-11-26 | End: 2019-08-09 | Stop reason: HOSPADM

## 2018-11-26 RX ADMIN — LEVETIRACETAM 500 MG: 500 TABLET, FILM COATED ORAL at 08:11

## 2018-11-26 RX ADMIN — SEVELAMER CARBONATE 1600 MG: 800 TABLET, FILM COATED ORAL at 05:11

## 2018-11-26 RX ADMIN — SEVELAMER CARBONATE 1600 MG: 800 TABLET, FILM COATED ORAL at 01:11

## 2018-11-26 RX ADMIN — SEVELAMER CARBONATE 1600 MG: 800 TABLET, FILM COATED ORAL at 08:11

## 2018-11-26 RX ADMIN — HEPARIN SODIUM 5000 UNITS: 5000 INJECTION, SOLUTION INTRAVENOUS; SUBCUTANEOUS at 08:11

## 2018-11-26 RX ADMIN — DOLUTEGRAVIR SODIUM 50 MG: 50 TABLET, FILM COATED ORAL at 08:11

## 2018-11-26 RX ADMIN — OXYCODONE HYDROCHLORIDE AND ACETAMINOPHEN 1 TABLET: 10; 325 TABLET ORAL at 12:11

## 2018-11-26 RX ADMIN — PANTOPRAZOLE SODIUM 40 MG: 40 TABLET, DELAYED RELEASE ORAL at 09:11

## 2018-11-26 RX ADMIN — HEPARIN SODIUM 5000 UNITS: 5000 INJECTION, SOLUTION INTRAVENOUS; SUBCUTANEOUS at 12:11

## 2018-11-26 RX ADMIN — FLUOXETINE 40 MG: 20 CAPSULE ORAL at 08:11

## 2018-11-26 RX ADMIN — MORPHINE SULFATE 2 MG: 4 INJECTION, SOLUTION INTRAMUSCULAR; INTRAVENOUS at 08:11

## 2018-11-26 RX ADMIN — GENTAMICIN SULFATE 100.4 MG: 40 INJECTION, SOLUTION INTRAMUSCULAR; INTRAVENOUS at 10:11

## 2018-11-26 RX ADMIN — DAPSONE 100 MG: 25 TABLET ORAL at 08:11

## 2018-11-26 RX ADMIN — AMLODIPINE BESYLATE 10 MG: 5 TABLET ORAL at 08:11

## 2018-11-26 RX ADMIN — OXYCODONE HYDROCHLORIDE AND ACETAMINOPHEN 1 TABLET: 10; 325 TABLET ORAL at 05:11

## 2018-11-26 RX ADMIN — VANCOMYCIN HYDROCHLORIDE 500 MG: 500 INJECTION, POWDER, LYOPHILIZED, FOR SOLUTION INTRAVENOUS at 09:11

## 2018-11-26 RX ADMIN — ERYTHROPOIETIN 10000 UNITS: 10000 INJECTION, SOLUTION INTRAVENOUS; SUBCUTANEOUS at 09:11

## 2018-11-26 NOTE — NURSING
No significant events overnight. Remains free from fall, injury, and skin breakdown. Voiding independently. VSS on RA throughout the night. Pain well controlled with PO meds. Tele maintained; all alarms active and audible. Neurovascular intact to RUE. Incision/dressing CDI. Plan of care reviewed with patient and all questions answered. Bed low, locked. Call light within reach. Purposeful rounding performed. Resting comfortably in bed, no other complaints at this time.

## 2018-11-26 NOTE — PLAN OF CARE
Problem: Patient Care Overview  Goal: Plan of Care Review  Outcome: Ongoing (interventions implemented as appropriate)  No change in respiratory status, will continue to monitor.

## 2018-11-26 NOTE — PLAN OF CARE
11/26/18 1520   Discharge Reassessment   Assessment Type Discharge Planning Reassessment   Provided patient/caregiver education on the expected discharge date and the discharge plan Yes   Do you have any problems affording any of your prescribed medications? No   Discharge Plan A Assisted Living   Patient choice form signed by patient/caregiver N/A   Anticipated Discharge Disposition Home   Can the patient answer the patient profile reliably? Yes, cognitively intact   How does the patient rate their overall health at the present time? Fair   Describe the patient's ability to walk at the present time. Minor restrictions or changes   How often would a person be available to care for the patient? Often

## 2018-11-27 VITALS
HEIGHT: 66 IN | BODY MASS INDEX: 25.4 KG/M2 | WEIGHT: 158.06 LBS | TEMPERATURE: 98 F | DIASTOLIC BLOOD PRESSURE: 74 MMHG | SYSTOLIC BLOOD PRESSURE: 131 MMHG | RESPIRATION RATE: 14 BRPM | HEART RATE: 81 BPM | OXYGEN SATURATION: 95 %

## 2018-11-27 PROCEDURE — 99239 HOSP IP/OBS DSCHRG MGMT >30: CPT | Mod: ,,, | Performed by: INTERNAL MEDICINE

## 2018-11-27 PROCEDURE — 63600175 PHARM REV CODE 636 W HCPCS: Performed by: INTERNAL MEDICINE

## 2018-11-27 PROCEDURE — 25000003 PHARM REV CODE 250: Performed by: INTERNAL MEDICINE

## 2018-11-27 RX ADMIN — PANTOPRAZOLE SODIUM 40 MG: 40 TABLET, DELAYED RELEASE ORAL at 09:11

## 2018-11-27 RX ADMIN — FLUOXETINE 40 MG: 20 CAPSULE ORAL at 09:11

## 2018-11-27 RX ADMIN — DOLUTEGRAVIR SODIUM 50 MG: 50 TABLET, FILM COATED ORAL at 09:11

## 2018-11-27 RX ADMIN — HEPARIN SODIUM 5000 UNITS: 5000 INJECTION, SOLUTION INTRAVENOUS; SUBCUTANEOUS at 09:11

## 2018-11-27 RX ADMIN — AMLODIPINE BESYLATE 10 MG: 5 TABLET ORAL at 09:11

## 2018-11-27 RX ADMIN — DAPSONE 100 MG: 25 TABLET ORAL at 09:11

## 2018-11-27 RX ADMIN — LEVETIRACETAM 500 MG: 500 TABLET, FILM COATED ORAL at 09:11

## 2018-11-27 NOTE — ASSESSMENT & PLAN NOTE
Us non vascular shows hematoma /abscess  Blood cx likely contaminant, repeat blood cx negative   On vanc and gentamycin with hd , s/p iv gent 11/19 as post hd  level was on lower side   Us showed pseudoaneurysm of right brachial artery  S/p repair and removal of av fistula  appr sx help  Will continue vanc , gent  due to emergent sx, talked to dr coombs , wants to continue for 2 weeks with hd.  S/p last vanc and gent on11/21  -vanc level 19.8 before HD, will give 500 mg vanc with  HD   - gent level 2 hours after HD 0.5, gen 100 mg today and check peak levels 30 minutes after infusion    -  Prn pain meds  - dressing changes per dr coombs

## 2018-11-27 NOTE — PLAN OF CARE
Problem: Patient Care Overview  Goal: Plan of Care Review  Outcome: Ongoing (interventions implemented as appropriate)  Patient alert and oriented x 3. Right arm fistula, dressing in place around arm, with no drainage present. RYLEE drain to site output scant and  serosanguinous. Left internal juglar in place, dressing dry clean and intact. Ambulates independently, fall precautions in place, no falls or injury sustained during shift. Pain controlled with iV and oral pain medications/ Vitals stable. Purposeful hourly rounding conducted, will continue to monitor.

## 2018-11-27 NOTE — PROGRESS NOTES
[facility] Hospital Medicine  Progress Note    Patient Name: Greyson Hudson III  MRN: [id number]  Patient Class: IP- Inpatient   Admission Date: 11/16/2018  Length of Stay: 5 days  Attending Physician: Audrey Rider MD  Primary Care Provider: James Torres MD        Subjective:     Principal Problem:Mechanical complication of arteriovenous fistula surgically created    HPI:  52 year old male with past medical history of htn, esrd on hd m/w/f, seizure disorder had av graft placement in right arm about 2 weeks ago.He came in c/o pain in right arm , more worse today.He went to HD today but did not do it as he was hurting in his arm.He denies fever, chills, nausea, vomiting.Says cannot move his arm much.He did not fever or wbc in er.Was given a dose of vanc and gentamycin.Patient had a left side tunneled catheter for hd.He aster said he ran out of his pain meds.    Hospital Course:  Still c/o pain and swelling in right arm, no fever, cx negative so far.Got HD on 11/17/18.US non vascular showed 11 cm hypoechoic avascular lesion in right axillary region that could reflect abscess, hematoma, soft tissue mass.Patients pain was  better and able to move arm better on 11/18/18.Last evening , he developed more swelling of the arm and worsened pain from earlier.He had doppler radial  pulse and was given iv dilaudid for pain.Discussed with Dr Apple 11/19 , he felt a pulsating mass in right upper axilla, and patient will need us to r/o pseudoaneurysm.His us showed right axillary artery pseudoaneurysm and sx was planned the next morning but the patient started bleeding from the aneurysm and had to be taken to sx at night.He had repair of pseudoaneurysm right brachial artery with gore alexia interposition graft and removal of gore alexia av fistula.He received 4 units of blood and stable h/h.Sx recommended to continue antibiotics for 2 weeks post surgery.    Interval History:hd today , no acute issuesReview of Systems    Constitutional: Negative for chills and fever.   HENT: Negative for trouble swallowing.    Respiratory: Negative for cough and shortness of breath.    Cardiovascular: Negative for chest pain and leg swelling.   Gastrointestinal: Negative for abdominal pain, diarrhea and vomiting.   Genitourinary: Negative for dysuria and hematuria.   Musculoskeletal:        Right arm swelling   Skin: Negative for rash.   Neurological: Negative for numbness and headaches.   Psychiatric/Behavioral: Negative for confusion.     Objective:     Vital Signs (Most Recent):  Temp: 98.1 °F (36.7 °C) (11/26/18 1219)  Pulse: 80 (11/26/18 1800)  Resp: 16 (11/26/18 1219)  BP: 100/66 (11/26/18 1219)  SpO2: 95 % (11/26/18 0759) Vital Signs (24h Range):  Temp:  [97.6 °F (36.4 °C)-98.6 °F (37 °C)] 98.1 °F (36.7 °C)  Pulse:  [] 80  Resp:  [16-18] 16  SpO2:  [94 %-97 %] 95 %  BP: ()/(62-93) 100/66     Weight: 71.7 kg (158 lb 1.1 oz)  Body mass index is 25.51 kg/m².    Intake/Output Summary (Last 24 hours) at 11/26/2018 2008  Last data filed at 11/26/2018 1205  Gross per 24 hour   Intake 500 ml   Output 2515 ml   Net -2015 ml      Physical Exam   Constitutional: He is oriented to person, place, and time. No distress.   HENT:   Head: Normocephalic and atraumatic.   Eyes: EOM are normal. Pupils are equal, round, and reactive to light.   Neck: Normal range of motion. Neck supple.   Cardiovascular: Normal rate and regular rhythm.   Pulmonary/Chest: Effort normal and breath sounds normal. No respiratory distress.   Abdominal: Soft. Bowel sounds are normal. He exhibits no distension. There is no tenderness.   Musculoskeletal:   Right upper arm dressed with drain, forearm swelling better    Neurological: He is alert and oriented to person, place, and time. No cranial nerve deficit.   Skin: Skin is warm.   Psychiatric: He has a normal mood and affect.       Significant Labs:   CBC:   Recent Labs   Lab 11/26/18  0435   WBC 4.78   HGB 8.1*   HCT  26.5*        CMP:   Recent Labs   Lab 11/26/18  0435   *   K 5.4*   CL 95   CO2 21*   GLU 82   BUN 63*   CREATININE 12.8*   CALCIUM 9.1   ANIONGAP 15   EGFRNONAA 4*       Significant Imaging: I have reviewed all pertinent imaging results/findings within the past 24 hours.    Assessment/Plan:      * Mechanical complication of arteriovenous fistula surgically created      Us non vascular shows hematoma /abscess  Blood cx likely contaminant, repeat blood cx negative   On vanc and gentamycin with hd , s/p iv gent 11/19 as post hd  level was on lower side   Us showed pseudoaneurysm of right brachial artery  S/p repair and removal of av fistula  appr sx help  Will continue vanc , gent  due to emergent sx, talked to dr coombs , wants to continue for 2 weeks with hd.  S/p last vanc and gent on11/21  -vanc level 19.8 before HD, will give 500 mg vanc with  HD   - gent level 2 hours after HD 0.5, gen 100 mg today and check peak levels 30 minutes after infusion    -  Prn pain meds  - dressing changes per dr coombs         Acute blood loss anemia    Has chronic anemia with esrd but had acute blood loss anemia with bleeding  S/p 4 unit prbc  Monitor h/h, STABLE        Hyperkalemia    Mild hyperkalemia  Improves with HD   MONITOR LABS       Seizure disorder    Continue home meds of keppra.       Anemia in ESRD (end-stage renal disease)    On procrit with hd       ESRD (end stage renal disease) on dialysis    Patient missed hd 11/16  S/p hd 11/17, 11/19 , 11/21  Nephrology following       HIV disease    Last cd4 count in 01/18 was 77  Patient on prezcobix and tivicay at home  Pharmacy was not able to get it  Patients  brought it  Continue dapsone daily         Essential hypertension    Low today  Hold meds for hypotension       VTE Risk Mitigation (From admission, onward)        Ordered     heparin (porcine) injection 5,000 Units  Every 12 hours      11/23/18 2679     heparin (porcine) injection 5,000 Units   As needed (PRN)      11/17/18 1111     IP VTE HIGH RISK PATIENT  Once      11/16/18 1644     Place sequential compression device  Until discontinued      11/16/18 1644              Audrey Riedr MD  Department of Hospital Medicine   [facility]

## 2018-11-27 NOTE — PROGRESS NOTES
[facility] Nephrology Progress Note    Admit Date: 11/16/2018   LOS: 6 days     SUBJECTIVE:     Follow-up For:  ESRD evaluation and management    Overnight Events:  Did well overnight with no acute events.  Otherwise denies recent CP, SOB, palp, abd pain, n/v/d, LE swelling.  Denies appetite disturbances or significant fatigue.  His RUE has improved in swelling and pain.      Scheduled Meds:  Continuous Infusions:  PRN Meds:    Review of patient's allergies indicates:   Allergen Reactions    Pcn [penicillins] Itching       Review of Systems  10 point ROS done and is neg besides what is listed above in HPI    OBJECTIVE:     Vital Signs (Most Recent)  Temp: 98.1 °F (36.7 °C) (11/27/18 0806)  Pulse: 81 (11/27/18 0806)  Resp: 14 (11/27/18 0806)  BP: 131/74 (11/27/18 0806)  SpO2: 95 % (11/27/18 0806)    Vital Signs Range (Last 24H):  Temp:  [98.1 °F (36.7 °C)-98.6 °F (37 °C)]   Pulse:  []   Resp:  [14-18]   BP: (119-131)/(70-77)   SpO2:  [93 %-95 %]     I & O (Last 24H):No intake or output data in the 24 hours ending 11/27/18 1556  Physical Exam:  GEN:  appears well, comfortably sittin up in bed, in no acute distress  HEENT/NECK:  anicteric sclera, MMM, no apparent JVD  CVS:  RRR with no murmurs or rubs  PULM:  CTAB  ABD:  soft, nontender, nondistended, normal bowel sounds  EXTR:  no edema  SKIN:  no rashes or lesions, warm and not diaphoretic  ACCESS:  L IJ TDC, clean; RUE wrapped    Laboratory:  Results for RUFINO GARCIARUTH SCHUMACHER (MRN [id number]) as of 11/27/2018 15:57   Ref. Range 11/26/2018 04:35 11/26/2018 15:24   WBC Latest Ref Range: 3.90 - 12.70 K/uL 4.78    RBC Latest Ref Range: 4.60 - 6.20 M/uL 2.94 (L)    Hemoglobin Latest Ref Range: 14.0 - 18.0 g/dL 8.1 (L)    Hematocrit Latest Ref Range: 40.0 - 54.0 % 26.5 (L)    MCV Latest Ref Range: 82 - 98 fL 90    MCH Latest Ref Range: 27.0 - 31.0 pg 27.6    MCHC Latest Ref Range: 32.0 - 36.0 g/dL 30.6 (L)    RDW Latest Ref Range: 11.5 - 14.5 % 15.2 (H)    Platelets Latest Ref  Range: 150 - 350 K/uL 240    MPV Latest Ref Range: 9.2 - 12.9 fL 9.4    Gran% Latest Ref Range: 38.0 - 73.0 % 56.9    Gran # (ANC) Latest Ref Range: 1.8 - 7.7 K/uL 2.7    Lymph% Latest Ref Range: 18.0 - 48.0 % 26.6    Lymph # Latest Ref Range: 1.0 - 4.8 K/uL 1.3    Mono% Latest Ref Range: 4.0 - 15.0 % 11.1    Mono # Latest Ref Range: 0.3 - 1.0 K/uL 0.5    Eosinophil% Latest Ref Range: 0.0 - 8.0 % 5.0    Eos # Latest Ref Range: 0.0 - 0.5 K/uL 0.2    Basophil% Latest Ref Range: 0.0 - 1.9 % 0.2    Baso # Latest Ref Range: 0.00 - 0.20 K/uL 0.01    Differential Method Unknown Automated    Sodium Latest Ref Range: 136 - 145 mmol/L 131 (L)    Potassium Latest Ref Range: 3.5 - 5.1 mmol/L 5.4 (H)    Chloride Latest Ref Range: 95 - 110 mmol/L 95    CO2 Latest Ref Range: 23 - 29 mmol/L 21 (L)    Anion Gap Latest Ref Range: 8 - 16 mmol/L 15    BUN, Bld Latest Ref Range: 6 - 20 mg/dL 63 (H)    Creatinine Latest Ref Range: 0.5 - 1.4 mg/dL 12.8 (H)    eGFR if non African American Latest Ref Range: >60 mL/min/1.73 m^2 4 (A)    eGFR if African American Latest Ref Range: >60 mL/min/1.73 m^2 5 (A)    Glucose Latest Ref Range: 70 - 110 mg/dL 82    Calcium Latest Ref Range: 8.7 - 10.5 mg/dL 9.1    Gentamicin, Random Latest Ref Range: Not established ug/mL  0.5   Vancomycin, Random Latest Ref Range: Not established ug/mL 19.8    GENTAMICIN, RANDOM Unknown  Rpt       Diagnostic Results:  Labs: Reviewed    ASSESSMENT/PLAN:     52 year old M with h/o ESRD (MWF at [facility] under my care), HTN, seizure d/o, HIV who presents with swelling in his RUE, 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 Apple, doing much better.     1. ESRD - as above  - will dialyze thrice weekly unless more urgent indications arise  - strict I/O, daily weights  - avoid gadolinium, fleets, other phos-based laxatives, NSAIDs, aminoglycosides, IV contrast only if necessary     2. HTN  - currently well controlled     3. Anemia ESRD and recent blood  loss  - cont to monitor, Hgb stable  - EPO with HD     4. MBD of ESRD  - binders with meals     5. Seizure d/o  - keppra dose after HD  - he was advised to limit his fluid intake so that we would be less aggressive on UF         James Torres MD

## 2018-11-27 NOTE — NURSING
Patient in agreement with discharge. IV removed from left forearm, catheter intact, placed gauze & coban dressing.  Patient tolerates PO intake without nausea or vomiting. Pain is well controlled. Patient is ambulating without difficulty. RUE AV fistula dressing C/D/I. Left IJ hemodialysis catheter dressing C/D/I. Reviewed all discharge instructions and patient verbalized understanding. Patient refused transport. I walked patient to [facility] elevators. Patient has steady gate and ambulates independently.

## 2018-11-27 NOTE — SUBJECTIVE & OBJECTIVE
Interval History:hd today , no acute issuesReview of Systems   Constitutional: Negative for chills and fever.   HENT: Negative for trouble swallowing.    Respiratory: Negative for cough and shortness of breath.    Cardiovascular: Negative for chest pain and leg swelling.   Gastrointestinal: Negative for abdominal pain, diarrhea and vomiting.   Genitourinary: Negative for dysuria and hematuria.   Musculoskeletal:        Right arm swelling   Skin: Negative for rash.   Neurological: Negative for numbness and headaches.   Psychiatric/Behavioral: Negative for confusion.     Objective:     Vital Signs (Most Recent):  Temp: 98.1 °F (36.7 °C) (11/26/18 1219)  Pulse: 80 (11/26/18 1800)  Resp: 16 (11/26/18 1219)  BP: 100/66 (11/26/18 1219)  SpO2: 95 % (11/26/18 0759) Vital Signs (24h Range):  Temp:  [97.6 °F (36.4 °C)-98.6 °F (37 °C)] 98.1 °F (36.7 °C)  Pulse:  [] 80  Resp:  [16-18] 16  SpO2:  [94 %-97 %] 95 %  BP: ()/(62-93) 100/66     Weight: 71.7 kg (158 lb 1.1 oz)  Body mass index is 25.51 kg/m².    Intake/Output Summary (Last 24 hours) at 11/26/2018 2008  Last data filed at 11/26/2018 1205  Gross per 24 hour   Intake 500 ml   Output 2515 ml   Net -2015 ml      Physical Exam   Constitutional: He is oriented to person, place, and time. No distress.   HENT:   Head: Normocephalic and atraumatic.   Eyes: EOM are normal. Pupils are equal, round, and reactive to light.   Neck: Normal range of motion. Neck supple.   Cardiovascular: Normal rate and regular rhythm.   Pulmonary/Chest: Effort normal and breath sounds normal. No respiratory distress.   Abdominal: Soft. Bowel sounds are normal. He exhibits no distension. There is no tenderness.   Musculoskeletal:   Right upper arm dressed with drain, forearm swelling better    Neurological: He is alert and oriented to person, place, and time. No cranial nerve deficit.   Skin: Skin is warm.   Psychiatric: He has a normal mood and affect.       Significant Labs:   CBC:    Recent Labs   Lab 11/26/18  0435   WBC 4.78   HGB 8.1*   HCT 26.5*        CMP:   Recent Labs   Lab 11/26/18  0435   *   K 5.4*   CL 95   CO2 21*   GLU 82   BUN 63*   CREATININE 12.8*   CALCIUM 9.1   ANIONGAP 15   EGFRNONAA 4*       Significant Imaging: I have reviewed all pertinent imaging results/findings within the past 24 hours.

## 2018-11-27 NOTE — PLAN OF CARE
11/27/18 1033   Final Note   Assessment Type Final Discharge Note   Anticipated Discharge Disposition Home   Hospital Follow Up  Appt(s) scheduled? Yes   Discharge plans and expectations educations in teach back method with documentation complete? Yes   Right Care Referral Info   Post Acute Recommendation No Care   Referral Type see AVS

## 2018-11-27 NOTE — PLAN OF CARE
D/C Planning:    Pt states he lives at [facility] and is assisted there by , Ms. Salinas.  [phone].      Pt attends HD MWF at [facility] in [facility] under Dr. Torres.  [phone] / fax [phone].  Spoke with the charge nurse at [facility] this morning.  Per [facility], Vanc and Gent are medications they have in their supply, so no need to provide an order at this time.      Per Dr. Apple and Dr. Rider pt to be discharged today.   Dr. Apple stated, Mr hudson will need Vanc and Gent for 2 weeks from Surgery date. (expected stop date 12/5/2018)     Pt states he will ride home with his dad.   Dad will pick him up around noon.   All discharge needs were discussed and reviewed with Mr Hudson. All questions were answered.      CM to continue to follow.

## 2018-11-28 ENCOUNTER — PATIENT OUTREACH (OUTPATIENT)
Dept: ADMINISTRATIVE | Facility: CLINIC | Age: 53
End: 2018-11-28

## 2018-11-28 NOTE — DISCHARGE SUMMARY
[facility] Hospital Medicine  Discharge Summary      Patient Name: Greyson Hudson III  MRN: [id number]  Admission Date: 11/16/2018  Hospital Length of Stay: 11 days  Discharge Date and Time: 11/27/2018 10:55 AM  Attending Physician: No att. providers found   Discharging Provider: Audrey Rider MD  Primary Care Provider: James Torres MD      HPI:   52 year old male with past medical history of htn, esrd on hd m/w/f, seizure disorder had av graft placement in right arm about 2 weeks ago.He came in c/o pain in right arm , more worse today.He went to HD today but did not do it as he was hurting in his arm.He denies fever, chills, nausea, vomiting.Says cannot move his arm much.He did not fever or wbc in er.Was given a dose of vanc and gentamycin.Patient had a left side tunneled catheter for hd.He aster said he ran out of his pain meds.    Procedure(s) (LRB):  repair of bleeding pseudoanuerysm with interpositional gortex graft (Right)  REMOVAL, GRAFT, gortex AV fistula (Right)      Hospital Course:   Still c/o pain and swelling in right arm, no fever, cx negative so far.Got HD on 11/17/18.US non vascular showed 11 cm hypoechoic avascular lesion in right axillary region that could reflect abscess, hematoma, soft tissue mass.Patients pain was  better and able to move arm better on 11/18/18.Last evening , he developed more swelling of the arm and worsened pain from earlier.He had doppler radial  pulse and was given iv dilaudid for pain.Discussed with Dr Apple 11/19 , he felt a pulsating mass in right upper axilla, and patient will need us to r/o pseudoaneurysm.His us showed right axillary artery pseudoaneurysm and sx was planned the next morning but the patient started bleeding from the aneurysm and had to be taken to sx at night.He had repair of pseudoaneurysm right brachial artery with gore alexia interposition graft and removal of gore alexia av fistula on 11/21/18.He received 4 units of blood and stable h/h  after that.He was getting HD while in hospital.Pain was controlled with pain meds.Sx recommended to continue antibiotics for 2 weeks post surgery due to emergent sx  which was vancomycin 500 mg and gentamicin 100 mg with HD and will need another week of it.His drain was removed before dc and will follow outpatient.He was continued on keppra and his hiv med.     Consults:   Consults (From admission, onward)        Status Ordering Provider     Inpatient consult to Nephrology  Once     Provider:  Clarisa Bazan MD    Completed [facility]     Pharmacy to renally dose medication  Once     Provider:  (Not yet assigned)    Completed [facility] MONTES [facility]          No new Assessment & Plan notes have been filed under this hospital service since the last note was generated.  Service: Hospital Medicine    Final Active Diagnoses:    Diagnosis Date Noted POA    PRINCIPAL PROBLEM:  Mechanical complication of arteriovenous fistula surgically created [T82.590A] 05/26/2018 Yes    Cellulitis of arm, right [L03.113] 11/21/2018 Yes    Acute blood loss anemia [D62] 11/21/2018 Yes    Seizure disorder [G40.909]  Yes    Hyperkalemia [E87.5]  Yes    Anemia in ESRD (end-stage renal disease) [N18.6, D63.1]  Yes    ESRD (end stage renal disease) on dialysis [N18.6, Z99.2] 08/03/2018 Not Applicable    HIV disease [B20] 01/07/2018 Yes     Chronic    Essential hypertension [I10]  Yes     Chronic      Problems Resolved During this Admission:    Diagnosis Date Noted Date Resolved POA    Bacteremia [R78.81] 11/18/2018 11/24/2018 No       Discharged Condition: stable    Disposition: Home or Self Care    Follow Up:  Follow-up Information     James Torres MD In 1 week.    Specialty:  Nephrology  Why:  See on your HD unit.  Contact information:  [address]  [address]  3RD FLOOR  [facility] [address]  [phone]             Jeremiah Apple Jr, MD In 2 days.    Specialties:  Vascular Surgery, General Surgery  Why:  See as scheduled  Contact  information:  [address]  [address]  [facility] [address]  [phone]             [facility] In 1 day.    Specialty:  Dialysis Center  Why:  Resume Hemodialysis on MWF   Contact information:  [address]  [facility] [address]  [phone]                 Patient Instructions:      Diet renal     Diet renal     Activity as tolerated     Activity as tolerated       Significant Diagnostic Studies:  Lab Results   Component Value Date    WBC 4.78 11/26/2018    HGB 8.1 (L) 11/26/2018    HCT 26.5 (L) 11/26/2018    MCV 90 11/26/2018     11/26/2018     BMP  Lab Results   Component Value Date     (L) 11/26/2018    K 5.4 (H) 11/26/2018    CL 95 11/26/2018    CO2 21 (L) 11/26/2018    BUN 63 (H) 11/26/2018    CREATININE 12.8 (H) 11/26/2018    CALCIUM 9.1 11/26/2018    ANIONGAP 15 11/26/2018    ESTGFRAFRICA 5 (A) 11/26/2018    EGFRNONAA 4 (A) 11/26/2018         Pending Diagnostic Studies:     None         Medications:  Reconciled Home Medications:      Medication List      START taking these medications    oxyCODONE-acetaminophen  mg per tablet  Commonly known as:  PERCOCET  Take 1 tablet by mouth every 6 (six) hours as needed for Pain.  Replaces:  oxyCODONE-acetaminophen 7.5-325 mg per tablet        CHANGE how you take these medications    FLUoxetine 40 MG capsule  Commonly known as:  PROZAC  Take 40 mg by mouth once daily.  What changed:  Another medication with the same name was removed. Continue taking this medication, and follow the directions you see here.     levETIRAcetam 500 MG Tab  Commonly known as:  KEPPRA  Take 1 tablet (500 mg total) by mouth 2 (two) times daily.  What changed:    · medication strength  · Another medication with the same name was removed. Continue taking this medication, and follow the directions you see here.        CONTINUE taking these medications    amLODIPine 10 MG tablet  Commonly known as:  NORVASC  Take 1 tablet (10 mg total) by  mouth once daily.     carvedilol 3.125 MG tablet  Commonly known as:  COREG  Take 3.125 mg by mouth 2 (two) times daily with meals.     dapsone 100 MG Tab  Take 100 mg by mouth once daily.     mirtazapine 15 MG tablet  Commonly known as:  REMERON  Take 15 mg by mouth every evening.     pantoprazole 40 MG tablet  Commonly known as:  PROTONIX  Take 1 tablet (40 mg total) by mouth once daily.     patiromer calcium sorbitex 8.4 gram Pwpk  Commonly known as:  VELTASSA  Take 8.4 g by mouth once daily.     pravastatin 40 MG tablet  Commonly known as:  PRAVACHOL  Take 40 mg by mouth once daily.     PREZCOBIX 800-150 mg-mg Tab  Generic drug:  darunavir-cobicistat  Take 1 tablet by mouth once daily.     sevelamer carbonate 800 mg Tab  Commonly known as:  RENVELA  Take 2 tablets (1,600 mg total) by mouth 3 (three) times daily with meals.     TIVICAY 50 mg Tab  Generic drug:  dolutegravir  Take 50 mg by mouth once daily.     triamcinolone acetonide 0.1% 0.1 % cream  Commonly known as:  KENALOG  Apply topically once daily.        STOP taking these medications    acyclovir 200 MG capsule  Commonly known as:  ZOVIRAX     clindamycin 1 % lotion  Commonly known as:  CLEOCIN T     HYDROcodone-acetaminophen 7.5-325 mg per tablet  Commonly known as:  NORCO     oxyCODONE-acetaminophen 7.5-325 mg per tablet  Commonly known as:  PERCOCET  Replaced by:  oxyCODONE-acetaminophen  mg per tablet            Indwelling Lines/Drains at time of discharge:   Lines/Drains/Airways     Central Venous Catheter Line                 Hemodialysis Catheter 08/03/18 1935 left internal jugular 116 days          Drain                 Hemodialysis AV Fistula Right upper arm -- days                Time spent on the discharge of patient: 40  minutes  Patient was seen and examined on the date of discharge and determined to be suitable for discharge.         Audrey Rider MD  Department of Hospital Medicine  [facility]

## 2018-11-28 NOTE — PATIENT INSTRUCTIONS
Recognizing and Treating Wound Infection  Wounds can become infected with harmful bacteria. This prevents healing and increases the risk of scars. In some cases, the infection may spread to other parts of the body. And infection with the bacteria that cause tetanus can be fatal. Know what to watch for and get prompt treatment for infection.     Risk Factors  A wound is more likely to become infected if it:  Results from a puncture, such as from a nail or piece of glass.  Results from a human or animal bite.  Isn't cleaned or treated within 8 hours.  Occurs in your hand, foot, leg, armpit, or groin.  Contains dirt or saliva.  Heals very slowly.  Occurs in a person with diabetes, alcoholism, or a compromised immune system.    Symptoms of Infection  Call your healthcare provider at the first sign of infection, such as:  Yellow, yellow-green, or foul-smelling drainage from a wound  Increased pain, swelling, or redness in or near a wound  A change in the color or size of a wound  Red streaks in the skin around the wound  Fever    Treatment  Treatment is likely to depend on the type and extent of your infection. Your healthcare provider may prescribe oral antibiotics to help fight bacteria. He or she may also flush the wound with an antibiotic solution or apply an antibiotic ointment. Sometimes an abscess (a pocket of pus) may form. In that case, the abscess will be opened and the fluid drained. You may need hospital care if the infection is very severe.    Preventing Wound Infection  Follow these steps to help keep wounds from developing infection:  Wash the wound right away with soap and water.  Apply a small amount of antibiotic ointment. You can buy this at the drugstore without a prescription.  Cover wounds with a bandage or gauze dressing.  Keep the wound clean and dry for the first 24hrs  Change the dressing daily using sterile gloves  © 8533-7368 [employer], [address] [place] [address], [place] [address]. All  rights reserved. This information is not intended as a substitute for professional medical care. Always follow your healthcare professional's instructions.

## 2018-12-18 ENCOUNTER — HOSPITAL ENCOUNTER (EMERGENCY)
Facility: OTHER | Age: 53
Discharge: HOME | End: 2018-12-18
Attending: EMERGENCY MEDICINE
Payer: MEDICAID

## 2018-12-18 VITALS
BODY MASS INDEX: 21.53 KG/M2 | HEIGHT: 66 IN | SYSTOLIC BLOOD PRESSURE: 136 MMHG | OXYGEN SATURATION: 94 % | HEART RATE: 96 BPM | DIASTOLIC BLOOD PRESSURE: 87 MMHG | WEIGHT: 134 LBS | TEMPERATURE: 98 F

## 2018-12-18 DIAGNOSIS — Z99.2 ESRD ON DIALYSIS: ICD-10-CM

## 2018-12-18 DIAGNOSIS — N18.6 ESRD ON DIALYSIS: ICD-10-CM

## 2018-12-18 DIAGNOSIS — E87.5 SERUM POTASSIUM ELEVATED: ICD-10-CM

## 2018-12-18 DIAGNOSIS — G40.909 SEIZURE DISORDER: Primary | ICD-10-CM

## 2018-12-18 DIAGNOSIS — D64.9 ANEMIA, UNSPECIFIED TYPE: ICD-10-CM

## 2018-12-18 LAB
ALBUMIN SERPL BCP-MCNC: 3.5 G/DL
ALP SERPL-CCNC: 82 U/L
ALT SERPL W/O P-5'-P-CCNC: 9 U/L
ANION GAP SERPL CALC-SCNC: 15 MMOL/L
AST SERPL-CCNC: 14 U/L
BASOPHILS # BLD AUTO: 0.01 K/UL
BASOPHILS NFR BLD: 0.1 %
BILIRUB SERPL-MCNC: 0.3 MG/DL
BUN SERPL-MCNC: 50 MG/DL
CALCIUM SERPL-MCNC: 10.1 MG/DL
CHLORIDE SERPL-SCNC: 101 MMOL/L
CO2 SERPL-SCNC: 22 MMOL/L
CREAT SERPL-MCNC: 14.5 MG/DL
DIFFERENTIAL METHOD: ABNORMAL
EOSINOPHIL # BLD AUTO: 0.2 K/UL
EOSINOPHIL NFR BLD: 2.9 %
ERYTHROCYTE [DISTWIDTH] IN BLOOD BY AUTOMATED COUNT: 16.3 %
EST. GFR  (AFRICAN AMERICAN): 4 ML/MIN/1.73 M^2
EST. GFR  (NON AFRICAN AMERICAN): 3 ML/MIN/1.73 M^2
GLUCOSE SERPL-MCNC: 124 MG/DL
HCT VFR BLD AUTO: 32.9 %
HGB BLD-MCNC: 10.1 G/DL
LYMPHOCYTES # BLD AUTO: 1.1 K/UL
LYMPHOCYTES NFR BLD: 16.7 %
MCH RBC QN AUTO: 28.2 PG
MCHC RBC AUTO-ENTMCNC: 30.7 G/DL
MCV RBC AUTO: 92 FL
MONOCYTES # BLD AUTO: 0.5 K/UL
MONOCYTES NFR BLD: 7.7 %
NEUTROPHILS # BLD AUTO: 4.9 K/UL
NEUTROPHILS NFR BLD: 72.5 %
PLATELET # BLD AUTO: 232 K/UL
PMV BLD AUTO: 9.8 FL
POTASSIUM SERPL-SCNC: 5.6 MMOL/L
PROT SERPL-MCNC: 9.5 G/DL
RBC # BLD AUTO: 3.58 M/UL
SODIUM SERPL-SCNC: 138 MMOL/L
WBC # BLD AUTO: 6.78 K/UL

## 2018-12-18 PROCEDURE — 25000003 PHARM REV CODE 250: Performed by: EMERGENCY MEDICINE

## 2018-12-18 PROCEDURE — 93010 ELECTROCARDIOGRAM REPORT: CPT | Mod: ,,, | Performed by: INTERNAL MEDICINE

## 2018-12-18 PROCEDURE — 96374 THER/PROPH/DIAG INJ IV PUSH: CPT

## 2018-12-18 PROCEDURE — 99284 EMERGENCY DEPT VISIT MOD MDM: CPT | Mod: 25

## 2018-12-18 PROCEDURE — 80053 COMPREHEN METABOLIC PANEL: CPT

## 2018-12-18 PROCEDURE — 80177 DRUG SCRN QUAN LEVETIRACETAM: CPT

## 2018-12-18 PROCEDURE — 85025 COMPLETE CBC W/AUTO DIFF WBC: CPT

## 2018-12-18 PROCEDURE — 63600175 PHARM REV CODE 636 W HCPCS: Performed by: EMERGENCY MEDICINE

## 2018-12-18 PROCEDURE — 93005 ELECTROCARDIOGRAM TRACING: CPT

## 2018-12-18 RX ORDER — LEVETIRACETAM 500 MG/1
500 TABLET ORAL
Status: COMPLETED | OUTPATIENT
Start: 2018-12-18 | End: 2018-12-18

## 2018-12-18 RX ORDER — DIPHENHYDRAMINE HYDROCHLORIDE 50 MG/ML
25 INJECTION INTRAMUSCULAR; INTRAVENOUS
Status: COMPLETED | OUTPATIENT
Start: 2018-12-18 | End: 2018-12-18

## 2018-12-18 RX ADMIN — LEVETIRACETAM 500 MG: 500 TABLET ORAL at 07:12

## 2018-12-18 RX ADMIN — DIPHENHYDRAMINE HYDROCHLORIDE 25 MG: 50 INJECTION, SOLUTION INTRAMUSCULAR; INTRAVENOUS at 07:12

## 2018-12-19 LAB — POCT GLUCOSE: 100 MG/DL (ref 70–110)

## 2018-12-19 NOTE — ED TRIAGE NOTES
"Pt arrives to Ed with c/o possible seizure and LOC. Pt states " they said I had blacked out and caught a seizure. I didn't know, I done blacked out and woke up on the floor. I had smoke a little weed earlier." Pt lying in bed, respirations even, unlabored, eyes open spontaneously, NAD noted. Pt denies chest pain, N/V/D. Pt placed on BP cycling,. Pulse ox, and cardiac monitoring.   "

## 2018-12-19 NOTE — ED NOTES
Pt lying in bed, respirations even, unlabored, eyes open spontaneously, NAD noted. Pt updated on plan of care, will continue to monitor with BP cycling, pulse ox, and cardiac monitoring
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
## 2018-12-21 LAB — LEVETIRACETAM SERPL-MCNC: 14.1 UG/ML (ref 3–60)

## 2019-03-25 ENCOUNTER — HOSPITAL ENCOUNTER (OUTPATIENT)
Facility: OTHER | Age: 54
Discharge: HOME | End: 2019-03-25
Attending: INTERNAL MEDICINE | Admitting: INTERNAL MEDICINE
Payer: MEDICAID

## 2019-03-25 VITALS
SYSTOLIC BLOOD PRESSURE: 155 MMHG | OXYGEN SATURATION: 97 % | RESPIRATION RATE: 16 BRPM | HEART RATE: 89 BPM | HEIGHT: 66 IN | TEMPERATURE: 98 F | DIASTOLIC BLOOD PRESSURE: 68 MMHG | BODY MASS INDEX: 22.98 KG/M2 | WEIGHT: 143 LBS

## 2019-03-25 DIAGNOSIS — N18.6 ESRD (END STAGE RENAL DISEASE): ICD-10-CM

## 2019-03-25 PROCEDURE — 36589 REMOVAL TUNNELED CV CATH: CPT | Performed by: INTERNAL MEDICINE

## 2019-03-25 PROCEDURE — 25000003 PHARM REV CODE 250: Performed by: INTERNAL MEDICINE

## 2019-03-25 RX ORDER — LIDOCAINE HYDROCHLORIDE 10 MG/ML
INJECTION INFILTRATION; PERINEURAL
Status: DISCONTINUED | OUTPATIENT
Start: 2019-03-25 | End: 2019-03-25 | Stop reason: HOSPADM
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
## 2019-03-25 NOTE — PROCEDURES
[facility]     Tunneled Dialysis Catheter Removal Procedure Note     Date of Procedure:  03/25/2019 12:48 PM    Primary Surgeon: James Torres MD  Assist: none    Procedure Performed:  1. Tunneled Dialysis Catheter Removal    Indication: Catheter not required; functioning AV Access     Location of catheter: L internal jugular vein     Patient was prepped and draped in a sterile fashion.  1% lidocaine was used for local anesthesia.  Blunt hemostat was used to dissect the exit site and fibrin sheath from the catheter cuff.  Once the cuff was freed, catheter was pulled and pressure was applied to the venotomy site for at least 5 minutes.  Hemostasis was achieved, tegaderm dressing applied.      Blood Loss: < 10 mL  Discharge instructions given verbally.    James Torres MD  [phone]

## 2019-03-25 NOTE — DISCHARGE INSTRUCTIONS
Home Care Instructions Pain Management:    1. DIET:   You may resume your normal diet today.   2. BATHING:   Do not shower until site completely healed, usually 2-3 days.   3. DRESSING:   Leave dressing on for 3 days  4. ACTIVITY LEVEL:   No heavy lifting  5. MEDICATIONS:   You may resume your normal medications today.   PLEASE CALL YOUR DOCTOR IF:  1. Redness or swelling around the injection site.  2. Fever of 101 degrees  3. Drainage (pus) from the injection site.  4. For any continuous bleeding (some dried blood over the incision is normal.)    FOR EMERGENCIES:   If any unusual problems or difficulties occur during clinic hours, call [phone] or [phone].

## 2019-03-25 NOTE — BRIEF OP NOTE
[facility]  Brief Operative Note     SUMMARY     Surgery Date: 3/25/2019     Surgeon(s) and Role:     * James Torres MD - Primary    Assisting Surgeon: None    Pre-op Diagnosis:  ESRD (end stage renal disease) [N18.6]    Post-op Diagnosis:  Post-Op Diagnosis Codes:     * ESRD (end stage renal disease) [N18.6]    Procedure(s) (LRB):  REMOVAL, CATHETER, CENTRAL VENOUS, TUNNELED (Left)    Anesthesia: 1% lidocaine    Description of the findings of the procedure: Patient was prepped and draped in a sterile fashion.  This was a successful removal of his L IJ tunneled dialysis catheter.  Patient tolerated the procedure well and no immediate complications noted.      Findings/Key Components: Patient was prepped and draped in a sterile fashion.  This was a successful removal of his L IJ tunneled dialysis catheter.  Patient tolerated the procedure well and no immediate complications noted.      Estimated Blood Loss: < 10 mL         Specimens:   Specimen (12h ago, onward)    None          Discharge Note    SUMMARY     Admit Date: 3/25/2019    Discharge Date and Time:  03/25/2019 12:44 PM    Hospital Course (synopsis of major diagnoses, care, treatment, and services provided during the course of the hospital stay): Patient was prepped and draped in a sterile fashion.  This was a successful removal of his L IJ tunneled dialysis catheter.  Patient tolerated the procedure well and no immediate complications noted.       Final Diagnosis: Post-Op Diagnosis Codes:     * ESRD (end stage renal disease) [N18.6]    Disposition: Home or Self Care    Follow Up/Patient Instructions:     Medications:  Reconciled Home Medications:      Medication List      CONTINUE taking these medications    amLODIPine 10 MG tablet  Commonly known as:  NORVASC  Take 1 tablet (10 mg total) by mouth once daily.     carvedilol 3.125 MG tablet  Commonly known as:  COREG  Take 3.125 mg by mouth 2 (two) times daily with meals.     dapsone  100 MG Tab  Take 100 mg by mouth once daily.     FLUoxetine 40 MG capsule  Take 40 mg by mouth once daily.     levETIRAcetam 500 MG Tab  Commonly known as:  KEPPRA  Take 1 tablet (500 mg total) by mouth 2 (two) times daily.     mirtazapine 15 MG tablet  Commonly known as:  REMERON  Take 15 mg by mouth every evening.     oxyCODONE-acetaminophen  mg per tablet  Commonly known as:  PERCOCET  Take 1 tablet by mouth every 6 (six) hours as needed for Pain.     pantoprazole 40 MG tablet  Commonly known as:  PROTONIX  Take 1 tablet (40 mg total) by mouth once daily.     patiromer calcium sorbitex 8.4 gram Pwpk  Commonly known as:  VELTASSA  Take 8.4 g by mouth once daily.     pravastatin 40 MG tablet  Commonly known as:  PRAVACHOL  Take 40 mg by mouth once daily.     PREZCOBIX 800-150 mg-mg Tab  Generic drug:  darunavir-cobicistat  Take 1 tablet by mouth once daily.     sevelamer carbonate 800 mg Tab  Commonly known as:  RENVELA  Take 2 tablets (1,600 mg total) by mouth 3 (three) times daily with meals.     TIVICAY 50 mg Tab  Generic drug:  dolutegravir  Take 50 mg by mouth once daily.     triamcinolone acetonide 0.1% 0.1 % cream  Commonly known as:  KENALOG  Apply topically once daily.          Discharge Procedure Orders   Ambulatory Referral to Pain Clinic   Referral Priority: Routine Referral Type: Consultation   Referral Reason: Specialty Services Required   Referred to Provider: VEENA REBOLLEDO Requested Specialty: Pain Medicine   Number of Visits Requested: 1     Call MD for:  temperature >100.4     Call MD for:  severe uncontrolled pain     Call MD for:  redness, tenderness, or signs of infection (pain, swelling, redness, odor or green/yellow discharge around incision site)     Call MD for:   Order Comments: Bleeding from the access site.     Activity as tolerated     Shower on day dressing removed (No bath)

## 2019-04-12 NOTE — HOSPITAL COURSE
Still c/o pain and swelling in right arm, no fever, cx negative so far.Got HD on 11/17/18.US non vascular showed 11 cm hypoechoic avascular lesion in right axillary region that could reflect abscess, hematoma, soft tissue mass.Patients pain was  better and able to move arm better on 11/18/18.Last evening , he developed more swelling of the arm and worsened pain from earlier.He had doppler radial  pulse and was given iv dilaudid for pain.Discussed with Dr Apple 11/19 , he felt a pulsating mass in right upper axilla, and patient will need us to r/o pseudoaneurysm.His us showed right axillary artery pseudoaneurysm and sx was planned the next morning but the patient started bleeding from the aneurysm and had to be taken to sx at night.He had repair of pseudoaneurysm right brachial artery with gore alexia interposition graft and removal of gore alexia av fistula on 11/21/18.He received 4 units of blood and stable h/h after that.He was getting HD while in hospital.Pain was controlled with pain meds.Sx recommended to continue antibiotics for 2 weeks post surgery due to emergent sx  which was vancomycin 500 mg and gentamicin 100 mg with HD and will need another week of it.His drain was removed before dc and will follow outpatient.He was continued on keppra and his hiv med.   not examined

## 2019-04-29 ENCOUNTER — HOSPITAL ENCOUNTER (OUTPATIENT)
Facility: HOSPITAL | Age: 54
Discharge: HOME | DRG: 091 | End: 2019-04-30
Attending: EMERGENCY MEDICINE | Admitting: HOSPITALIST
Payer: MEDICAID

## 2019-04-29 DIAGNOSIS — N18.6 ANEMIA IN ESRD (END-STAGE RENAL DISEASE): Primary | ICD-10-CM

## 2019-04-29 DIAGNOSIS — Z99.2 ESRD ON DIALYSIS: Chronic | ICD-10-CM

## 2019-04-29 DIAGNOSIS — R05.9 COUGH: ICD-10-CM

## 2019-04-29 DIAGNOSIS — D63.1 ANEMIA IN ESRD (END-STAGE RENAL DISEASE): Primary | ICD-10-CM

## 2019-04-29 DIAGNOSIS — D64.9 ANEMIA: ICD-10-CM

## 2019-04-29 DIAGNOSIS — N18.6 ESRD ON DIALYSIS: Chronic | ICD-10-CM

## 2019-04-29 DIAGNOSIS — M79.603 ARM PAIN: ICD-10-CM

## 2019-04-29 PROBLEM — M89.9 CHRONIC KIDNEY DISEASE-MINERAL AND BONE DISORDER: Status: ACTIVE | Noted: 2019-04-29

## 2019-04-29 PROBLEM — I13.11 BENIGN HYPERTENSIVE HEART AND KIDNEY DISEASE AND ESRD: Status: ACTIVE | Noted: 2019-04-29

## 2019-04-29 PROBLEM — L03.113 CELLULITIS OF ARM, RIGHT: Status: RESOLVED | Noted: 2018-11-21 | Resolved: 2019-04-29

## 2019-04-29 PROBLEM — L98.9 SKIN LESION: Status: RESOLVED | Noted: 2018-01-07 | Resolved: 2019-04-29

## 2019-04-29 PROBLEM — E83.9 CHRONIC KIDNEY DISEASE-MINERAL AND BONE DISORDER: Status: ACTIVE | Noted: 2019-04-29

## 2019-04-29 PROBLEM — G93.40 ACUTE ENCEPHALOPATHY: Status: ACTIVE | Noted: 2019-04-29

## 2019-04-29 PROBLEM — N18.9 CHRONIC KIDNEY DISEASE-MINERAL AND BONE DISORDER: Status: ACTIVE | Noted: 2019-04-29

## 2019-04-29 PROBLEM — T82.590A MECHANICAL COMPLICATION OF ARTERIOVENOUS FISTULA SURGICALLY CREATED: Status: RESOLVED | Noted: 2018-05-26 | Resolved: 2019-04-29

## 2019-04-29 PROBLEM — D62 ACUTE BLOOD LOSS ANEMIA: Status: RESOLVED | Noted: 2018-11-21 | Resolved: 2019-04-29

## 2019-04-29 LAB
ABO + RH BLD: NORMAL
ALBUMIN SERPL BCP-MCNC: 2.3 G/DL (ref 3.5–5.2)
ALP SERPL-CCNC: 89 U/L (ref 55–135)
ALT SERPL W/O P-5'-P-CCNC: 10 U/L (ref 10–44)
ANION GAP SERPL CALC-SCNC: 14 MMOL/L (ref 8–16)
AST SERPL-CCNC: 18 U/L (ref 10–40)
BACTERIA #/AREA URNS AUTO: NORMAL /HPF
BASOPHILS # BLD AUTO: 0.03 K/UL (ref 0–0.2)
BASOPHILS NFR BLD: 0.5 % (ref 0–1.9)
BILIRUB SERPL-MCNC: 0.3 MG/DL (ref 0.1–1)
BILIRUB UR QL STRIP: NEGATIVE
BLD GP AB SCN CELLS X3 SERPL QL: NORMAL
BLD PROD TYP BPU: NORMAL
BLOOD UNIT EXPIRATION DATE: NORMAL
BLOOD UNIT TYPE CODE: 5100
BLOOD UNIT TYPE: NORMAL
BUN SERPL-MCNC: 58 MG/DL (ref 6–20)
CALCIUM SERPL-MCNC: 8.8 MG/DL (ref 8.7–10.5)
CHLORIDE SERPL-SCNC: 98 MMOL/L (ref 95–110)
CLARITY UR REFRACT.AUTO: CLEAR
CO2 SERPL-SCNC: 25 MMOL/L (ref 23–29)
CODING SYSTEM: NORMAL
COLOR UR AUTO: ABNORMAL
CREAT SERPL-MCNC: 13.3 MG/DL (ref 0.5–1.4)
DIFFERENTIAL METHOD: ABNORMAL
DISPENSE STATUS: NORMAL
EOSINOPHIL # BLD AUTO: 0.4 K/UL (ref 0–0.5)
EOSINOPHIL NFR BLD: 6.2 % (ref 0–8)
ERYTHROCYTE [DISTWIDTH] IN BLOOD BY AUTOMATED COUNT: 15.9 % (ref 11.5–14.5)
EST. GFR  (AFRICAN AMERICAN): 4.4 ML/MIN/1.73 M^2
EST. GFR  (NON AFRICAN AMERICAN): 3.8 ML/MIN/1.73 M^2
GLUCOSE SERPL-MCNC: 86 MG/DL (ref 70–110)
GLUCOSE UR QL STRIP: ABNORMAL
HCT VFR BLD AUTO: 24.9 % (ref 40–54)
HGB BLD-MCNC: 7.4 G/DL (ref 14–18)
HGB UR QL STRIP: NEGATIVE
HYALINE CASTS UR QL AUTO: 0 /LPF
IMM GRANULOCYTES # BLD AUTO: 0.03 K/UL (ref 0–0.04)
IMM GRANULOCYTES NFR BLD AUTO: 0.5 % (ref 0–0.5)
KETONES UR QL STRIP: NEGATIVE
LEUKOCYTE ESTERASE UR QL STRIP: NEGATIVE
LYMPHOCYTES # BLD AUTO: 1.9 K/UL (ref 1–4.8)
LYMPHOCYTES NFR BLD: 32.5 % (ref 18–48)
MCH RBC QN AUTO: 29 PG (ref 27–31)
MCHC RBC AUTO-ENTMCNC: 29.7 G/DL (ref 32–36)
MCV RBC AUTO: 98 FL (ref 82–98)
MICROSCOPIC COMMENT: NORMAL
MONOCYTES # BLD AUTO: 0.7 K/UL (ref 0.3–1)
MONOCYTES NFR BLD: 11.9 % (ref 4–15)
NEUTROPHILS # BLD AUTO: 2.8 K/UL (ref 1.8–7.7)
NEUTROPHILS NFR BLD: 48.4 % (ref 38–73)
NITRITE UR QL STRIP: NEGATIVE
NRBC BLD-RTO: 0 /100 WBC
PH UR STRIP: >8 [PH] (ref 5–8)
PLATELET # BLD AUTO: 248 K/UL (ref 150–350)
PMV BLD AUTO: 10.5 FL (ref 9.2–12.9)
POTASSIUM SERPL-SCNC: 5.1 MMOL/L (ref 3.5–5.1)
PROT SERPL-MCNC: 7.8 G/DL (ref 6–8.4)
PROT UR QL STRIP: ABNORMAL
RBC # BLD AUTO: 2.55 M/UL (ref 4.6–6.2)
RBC #/AREA URNS AUTO: 1 /HPF (ref 0–4)
SODIUM SERPL-SCNC: 137 MMOL/L (ref 136–145)
SP GR UR STRIP: 1.01 (ref 1–1.03)
SQUAMOUS #/AREA URNS AUTO: 1 /HPF
TRANS ERYTHROCYTES VOL PATIENT: NORMAL ML
URN SPEC COLLECT METH UR: ABNORMAL
WBC # BLD AUTO: 5.79 K/UL (ref 3.9–12.7)
WBC #/AREA URNS AUTO: 2 /HPF (ref 0–5)

## 2019-04-29 PROCEDURE — P9021 RED BLOOD CELLS UNIT: HCPCS

## 2019-04-29 PROCEDURE — 93010 EKG 12-LEAD: ICD-10-PCS | Mod: ,,, | Performed by: INTERNAL MEDICINE

## 2019-04-29 PROCEDURE — 85025 COMPLETE CBC W/AUTO DIFF WBC: CPT

## 2019-04-29 PROCEDURE — 96375 TX/PRO/DX INJ NEW DRUG ADDON: CPT

## 2019-04-29 PROCEDURE — 86850 RBC ANTIBODY SCREEN: CPT

## 2019-04-29 PROCEDURE — 12000002 HC ACUTE/MED SURGE SEMI-PRIVATE ROOM

## 2019-04-29 PROCEDURE — 36430 TRANSFUSION BLD/BLD COMPNT: CPT

## 2019-04-29 PROCEDURE — 93005 ELECTROCARDIOGRAM TRACING: CPT

## 2019-04-29 PROCEDURE — 63600175 PHARM REV CODE 636 W HCPCS: Performed by: PHYSICIAN ASSISTANT

## 2019-04-29 PROCEDURE — 99285 EMERGENCY DEPT VISIT HI MDM: CPT | Mod: ,,, | Performed by: EMERGENCY MEDICINE

## 2019-04-29 PROCEDURE — G0378 HOSPITAL OBSERVATION PER HR: HCPCS

## 2019-04-29 PROCEDURE — 99223 PR INITIAL HOSPITAL CARE,LEVL III: ICD-10-PCS | Mod: ,,, | Performed by: HOSPITALIST

## 2019-04-29 PROCEDURE — 94640 AIRWAY INHALATION TREATMENT: CPT

## 2019-04-29 PROCEDURE — 99285 PR EMERGENCY DEPT VISIT,LEVEL V: ICD-10-PCS | Mod: ,,, | Performed by: EMERGENCY MEDICINE

## 2019-04-29 PROCEDURE — 86920 COMPATIBILITY TEST SPIN: CPT

## 2019-04-29 PROCEDURE — 99285 EMERGENCY DEPT VISIT HI MDM: CPT | Mod: 25

## 2019-04-29 PROCEDURE — 25000003 PHARM REV CODE 250: Performed by: HOSPITALIST

## 2019-04-29 PROCEDURE — 96374 THER/PROPH/DIAG INJ IV PUSH: CPT

## 2019-04-29 PROCEDURE — 99223 1ST HOSP IP/OBS HIGH 75: CPT | Mod: ,,, | Performed by: HOSPITALIST

## 2019-04-29 PROCEDURE — 80053 COMPREHEN METABOLIC PANEL: CPT

## 2019-04-29 PROCEDURE — 63600175 PHARM REV CODE 636 W HCPCS: Performed by: HOSPITALIST

## 2019-04-29 PROCEDURE — 25000242 PHARM REV CODE 250 ALT 637 W/ HCPCS: Performed by: PHYSICIAN ASSISTANT

## 2019-04-29 PROCEDURE — 81001 URINALYSIS AUTO W/SCOPE: CPT

## 2019-04-29 PROCEDURE — 93010 ELECTROCARDIOGRAM REPORT: CPT | Mod: ,,, | Performed by: INTERNAL MEDICINE

## 2019-04-29 RX ORDER — MIRTAZAPINE 15 MG/1
15 TABLET, FILM COATED ORAL ONCE
Status: COMPLETED | OUTPATIENT
Start: 2019-04-29 | End: 2019-04-29

## 2019-04-29 RX ORDER — MIRTAZAPINE 15 MG/1
15 TABLET, FILM COATED ORAL NIGHTLY
Status: DISCONTINUED | OUTPATIENT
Start: 2019-04-29 | End: 2019-04-30 | Stop reason: HOSPADM

## 2019-04-29 RX ORDER — SEVELAMER CARBONATE 800 MG/1
1600 TABLET, FILM COATED ORAL
Status: DISCONTINUED | OUTPATIENT
Start: 2019-04-29 | End: 2019-04-30 | Stop reason: HOSPADM

## 2019-04-29 RX ORDER — ACETAMINOPHEN 325 MG/1
650 TABLET ORAL EVERY 8 HOURS PRN
Status: DISCONTINUED | OUTPATIENT
Start: 2019-04-29 | End: 2019-04-30 | Stop reason: HOSPADM

## 2019-04-29 RX ORDER — ONDANSETRON 8 MG/1
8 TABLET, ORALLY DISINTEGRATING ORAL EVERY 8 HOURS PRN
Status: DISCONTINUED | OUTPATIENT
Start: 2019-04-29 | End: 2019-04-30 | Stop reason: HOSPADM

## 2019-04-29 RX ORDER — PRAVASTATIN SODIUM 40 MG/1
40 TABLET ORAL DAILY
Status: DISCONTINUED | OUTPATIENT
Start: 2019-04-30 | End: 2019-04-30 | Stop reason: HOSPADM

## 2019-04-29 RX ORDER — SODIUM CHLORIDE 0.9 % (FLUSH) 0.9 %
10 SYRINGE (ML) INJECTION
Status: DISCONTINUED | OUTPATIENT
Start: 2019-04-29 | End: 2019-04-30 | Stop reason: HOSPADM

## 2019-04-29 RX ORDER — SODIUM CHLORIDE 0.9 % (FLUSH) 0.9 %
5 SYRINGE (ML) INJECTION
Status: DISCONTINUED | OUTPATIENT
Start: 2019-04-29 | End: 2019-04-30 | Stop reason: HOSPADM

## 2019-04-29 RX ORDER — DIPHENHYDRAMINE HYDROCHLORIDE 50 MG/ML
25 INJECTION INTRAMUSCULAR; INTRAVENOUS
Status: COMPLETED | OUTPATIENT
Start: 2019-04-29 | End: 2019-04-29

## 2019-04-29 RX ORDER — PANTOPRAZOLE SODIUM 40 MG/1
40 TABLET, DELAYED RELEASE ORAL DAILY
Status: DISCONTINUED | OUTPATIENT
Start: 2019-04-30 | End: 2019-04-30 | Stop reason: HOSPADM

## 2019-04-29 RX ORDER — AMLODIPINE BESYLATE 10 MG/1
10 TABLET ORAL DAILY
Status: DISCONTINUED | OUTPATIENT
Start: 2019-04-30 | End: 2019-04-30 | Stop reason: HOSPADM

## 2019-04-29 RX ORDER — CARVEDILOL 3.12 MG/1
3.12 TABLET ORAL 2 TIMES DAILY WITH MEALS
Status: DISCONTINUED | OUTPATIENT
Start: 2019-04-29 | End: 2019-04-30 | Stop reason: HOSPADM

## 2019-04-29 RX ORDER — DAPSONE 100 MG/1
100 TABLET ORAL DAILY
Status: DISCONTINUED | OUTPATIENT
Start: 2019-04-30 | End: 2019-04-30 | Stop reason: HOSPADM

## 2019-04-29 RX ORDER — AMOXICILLIN 250 MG
1 CAPSULE ORAL 2 TIMES DAILY PRN
Status: DISCONTINUED | OUTPATIENT
Start: 2019-04-29 | End: 2019-04-30 | Stop reason: HOSPADM

## 2019-04-29 RX ORDER — IBUPROFEN 200 MG
24 TABLET ORAL
Status: DISCONTINUED | OUTPATIENT
Start: 2019-04-29 | End: 2019-04-30 | Stop reason: HOSPADM

## 2019-04-29 RX ORDER — IBUPROFEN 200 MG
16 TABLET ORAL
Status: DISCONTINUED | OUTPATIENT
Start: 2019-04-29 | End: 2019-04-30 | Stop reason: HOSPADM

## 2019-04-29 RX ORDER — FLUOXETINE HYDROCHLORIDE 20 MG/1
40 CAPSULE ORAL DAILY
Status: DISCONTINUED | OUTPATIENT
Start: 2019-04-30 | End: 2019-04-30 | Stop reason: HOSPADM

## 2019-04-29 RX ORDER — FUROSEMIDE 10 MG/ML
80 INJECTION INTRAMUSCULAR; INTRAVENOUS ONCE
Status: COMPLETED | OUTPATIENT
Start: 2019-04-29 | End: 2019-04-29

## 2019-04-29 RX ORDER — IPRATROPIUM BROMIDE AND ALBUTEROL SULFATE 2.5; .5 MG/3ML; MG/3ML
3 SOLUTION RESPIRATORY (INHALATION)
Status: COMPLETED | OUTPATIENT
Start: 2019-04-29 | End: 2019-04-29

## 2019-04-29 RX ORDER — GLUCAGON 1 MG
1 KIT INJECTION
Status: DISCONTINUED | OUTPATIENT
Start: 2019-04-29 | End: 2019-04-30 | Stop reason: HOSPADM

## 2019-04-29 RX ORDER — DARUNAVIR 800 MG/1
800 TABLET, FILM COATED ORAL
Status: DISCONTINUED | OUTPATIENT
Start: 2019-04-30 | End: 2019-04-30 | Stop reason: HOSPADM

## 2019-04-29 RX ORDER — HYDROCODONE BITARTRATE AND ACETAMINOPHEN 500; 5 MG/1; MG/1
TABLET ORAL
Status: DISCONTINUED | OUTPATIENT
Start: 2019-04-29 | End: 2019-04-30 | Stop reason: HOSPADM

## 2019-04-29 RX ORDER — LEVETIRACETAM 500 MG/1
500 TABLET ORAL 2 TIMES DAILY
Status: DISCONTINUED | OUTPATIENT
Start: 2019-04-29 | End: 2019-04-30 | Stop reason: HOSPADM

## 2019-04-29 RX ORDER — RAMELTEON 8 MG/1
8 TABLET ORAL NIGHTLY PRN
Status: DISCONTINUED | OUTPATIENT
Start: 2019-04-29 | End: 2019-04-30 | Stop reason: HOSPADM

## 2019-04-29 RX ADMIN — SEVELAMER CARBONATE 1600 MG: 800 TABLET, FILM COATED ORAL at 06:04

## 2019-04-29 RX ADMIN — DIPHENHYDRAMINE HYDROCHLORIDE 25 MG: 50 INJECTION INTRAMUSCULAR; INTRAVENOUS at 02:04

## 2019-04-29 RX ADMIN — LEVETIRACETAM 500 MG: 500 TABLET ORAL at 10:04

## 2019-04-29 RX ADMIN — FUROSEMIDE 80 MG: 10 INJECTION, SOLUTION INTRAMUSCULAR; INTRAVENOUS at 04:04

## 2019-04-29 RX ADMIN — MIRTAZAPINE 15 MG: 15 TABLET, FILM COATED ORAL at 11:04

## 2019-04-29 RX ADMIN — CARVEDILOL 3.12 MG: 3.12 TABLET, FILM COATED ORAL at 06:04

## 2019-04-29 RX ADMIN — IPRATROPIUM BROMIDE AND ALBUTEROL SULFATE 3 ML: .5; 3 SOLUTION RESPIRATORY (INHALATION) at 01:04

## 2019-04-29 NOTE — ASSESSMENT & PLAN NOTE
ESRD on HD  -Will plan for HD for metabolic clearance and fluid removal  -Obtain OP HD medical record  -maintain HD schedule while inpatient  -Hep B panel, daily CBC, renal function panel  -Renal diet  -1 liter fluid restrictions per day  -Daily standing weights and chart  -Strict I/O and chart  -Medications to renal parameter    Dialysis Information: [facility]  -Out patient HD unit: [facility]  -Nephrologist: Dr. Restrepo  -HD tx days: MWF  -HD tx time: 3 hours  -HD access: AVF  -Last OP HD: Sat (2/2 missed tx Fri 2/2 diarrhea)  -EDW: unsure  -RRF: oliguric    Anemia  -Hb > 7 gm/dL  -hold epogen    HTN  - Maintain MAP >65  - Keep target Bp <140/90
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
## 2019-04-29 NOTE — CONSULTS
Consult acknowledged. Chart reviewed. Patient seen and examined. ESRD on HD (MWF) with reported last HD on Sat 2/2 missed HD tx on Fri. No need for emergent dialysis today. Will plan for HD tomorrow. Patient with c/o L arm pain at AVG that began yesterday; recommend VAS US. Full note and recommendations to follow.
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
## 2019-04-29 NOTE — ED TRIAGE NOTES
States he was sent by his  for ed physician evaluation, states he has been disoriented since this morning, just started taking gabapentin today

## 2019-04-30 VITALS
DIASTOLIC BLOOD PRESSURE: 67 MMHG | OXYGEN SATURATION: 92 % | SYSTOLIC BLOOD PRESSURE: 129 MMHG | RESPIRATION RATE: 14 BRPM | BODY MASS INDEX: 26.57 KG/M2 | TEMPERATURE: 98 F | HEIGHT: 64 IN | HEART RATE: 101 BPM | WEIGHT: 155.63 LBS

## 2019-04-30 LAB
ALBUMIN SERPL BCP-MCNC: 2.4 G/DL (ref 3.5–5.2)
ALP SERPL-CCNC: 74 U/L (ref 55–135)
ALT SERPL W/O P-5'-P-CCNC: 9 U/L (ref 10–44)
AMPHET+METHAMPHET UR QL: NEGATIVE
ANION GAP SERPL CALC-SCNC: 15 MMOL/L (ref 8–16)
ANION GAP SERPL CALC-SCNC: 16 MMOL/L (ref 8–16)
AST SERPL-CCNC: 15 U/L (ref 10–40)
BARBITURATES UR QL SCN>200 NG/ML: NEGATIVE
BASOPHILS # BLD AUTO: 0.04 K/UL (ref 0–0.2)
BASOPHILS NFR BLD: 0.7 % (ref 0–1.9)
BENZODIAZ UR QL SCN>200 NG/ML: NEGATIVE
BILIRUB SERPL-MCNC: 0.4 MG/DL (ref 0.1–1)
BUN SERPL-MCNC: 67 MG/DL (ref 6–20)
BUN SERPL-MCNC: 72 MG/DL (ref 6–20)
BZE UR QL SCN: NORMAL
CALCIUM SERPL-MCNC: 9.3 MG/DL (ref 8.7–10.5)
CALCIUM SERPL-MCNC: 9.4 MG/DL (ref 8.7–10.5)
CANNABINOIDS UR QL SCN: NEGATIVE
CD3+CD4+ CELLS # BLD: 274 CELLS/UL (ref 300–1400)
CD3+CD4+ CELLS NFR BLD: 14.8 % (ref 28–57)
CHLORIDE SERPL-SCNC: 100 MMOL/L (ref 95–110)
CHLORIDE SERPL-SCNC: 99 MMOL/L (ref 95–110)
CO2 SERPL-SCNC: 23 MMOL/L (ref 23–29)
CO2 SERPL-SCNC: 25 MMOL/L (ref 23–29)
CREAT SERPL-MCNC: 14 MG/DL (ref 0.5–1.4)
CREAT SERPL-MCNC: 15.5 MG/DL (ref 0.5–1.4)
CREAT UR-MCNC: 37 MG/DL (ref 23–375)
DIFFERENTIAL METHOD: ABNORMAL
EOSINOPHIL # BLD AUTO: 0.4 K/UL (ref 0–0.5)
EOSINOPHIL NFR BLD: 6.4 % (ref 0–8)
ERYTHROCYTE [DISTWIDTH] IN BLOOD BY AUTOMATED COUNT: 17.2 % (ref 11.5–14.5)
EST. GFR  (AFRICAN AMERICAN): 3.6 ML/MIN/1.73 M^2
EST. GFR  (AFRICAN AMERICAN): 4.1 ML/MIN/1.73 M^2
EST. GFR  (NON AFRICAN AMERICAN): 3.2 ML/MIN/1.73 M^2
EST. GFR  (NON AFRICAN AMERICAN): 3.6 ML/MIN/1.73 M^2
ETHANOL UR-MCNC: <10 MG/DL
GLUCOSE SERPL-MCNC: 105 MG/DL (ref 70–110)
GLUCOSE SERPL-MCNC: 81 MG/DL (ref 70–110)
HCT VFR BLD AUTO: 30.9 % (ref 40–54)
HGB BLD-MCNC: 9.3 G/DL (ref 14–18)
IMM GRANULOCYTES # BLD AUTO: 0.02 K/UL (ref 0–0.04)
IMM GRANULOCYTES NFR BLD AUTO: 0.4 % (ref 0–0.5)
LYMPHOCYTES # BLD AUTO: 1.6 K/UL (ref 1–4.8)
LYMPHOCYTES NFR BLD: 27.8 % (ref 18–48)
MAGNESIUM SERPL-MCNC: 2.3 MG/DL (ref 1.6–2.6)
MCH RBC QN AUTO: 29.2 PG (ref 27–31)
MCHC RBC AUTO-ENTMCNC: 30.1 G/DL (ref 32–36)
MCV RBC AUTO: 97 FL (ref 82–98)
METHADONE UR QL SCN>300 NG/ML: NEGATIVE
MONOCYTES # BLD AUTO: 0.6 K/UL (ref 0.3–1)
MONOCYTES NFR BLD: 11.2 % (ref 4–15)
NEUTROPHILS # BLD AUTO: 3 K/UL (ref 1.8–7.7)
NEUTROPHILS NFR BLD: 53.5 % (ref 38–73)
NRBC BLD-RTO: 0 /100 WBC
OPIATES UR QL SCN: NEGATIVE
PCP UR QL SCN>25 NG/ML: NEGATIVE
PHOSPHATE SERPL-MCNC: 5.8 MG/DL (ref 2.7–4.5)
PLATELET # BLD AUTO: 261 K/UL (ref 150–350)
PMV BLD AUTO: 10.6 FL (ref 9.2–12.9)
POTASSIUM SERPL-SCNC: 5.4 MMOL/L (ref 3.5–5.1)
POTASSIUM SERPL-SCNC: 5.6 MMOL/L (ref 3.5–5.1)
PROT SERPL-MCNC: 7.9 G/DL (ref 6–8.4)
RBC # BLD AUTO: 3.18 M/UL (ref 4.6–6.2)
SODIUM SERPL-SCNC: 139 MMOL/L (ref 136–145)
SODIUM SERPL-SCNC: 139 MMOL/L (ref 136–145)
TOXICOLOGY INFORMATION: NORMAL
WBC # BLD AUTO: 5.61 K/UL (ref 3.9–12.7)

## 2019-04-30 PROCEDURE — 80307 DRUG TEST PRSMV CHEM ANLYZR: CPT

## 2019-04-30 PROCEDURE — G0378 HOSPITAL OBSERVATION PER HR: HCPCS

## 2019-04-30 PROCEDURE — 86361 T CELL ABSOLUTE COUNT: CPT

## 2019-04-30 PROCEDURE — 83735 ASSAY OF MAGNESIUM: CPT

## 2019-04-30 PROCEDURE — 84100 ASSAY OF PHOSPHORUS: CPT

## 2019-04-30 PROCEDURE — 99231 PR SUBSEQUENT HOSPITAL CARE,LEVL I: ICD-10-PCS | Mod: ,,, | Performed by: HOSPITALIST

## 2019-04-30 PROCEDURE — 36415 COLL VENOUS BLD VENIPUNCTURE: CPT

## 2019-04-30 PROCEDURE — 99233 PR SUBSEQUENT HOSPITAL CARE,LEVL III: ICD-10-PCS | Mod: ,,, | Performed by: NURSE PRACTITIONER

## 2019-04-30 PROCEDURE — 99231 SBSQ HOSP IP/OBS SF/LOW 25: CPT | Mod: ,,, | Performed by: HOSPITALIST

## 2019-04-30 PROCEDURE — 80053 COMPREHEN METABOLIC PANEL: CPT

## 2019-04-30 PROCEDURE — 85025 COMPLETE CBC W/AUTO DIFF WBC: CPT

## 2019-04-30 PROCEDURE — 80048 BASIC METABOLIC PNL TOTAL CA: CPT

## 2019-04-30 PROCEDURE — 63600175 PHARM REV CODE 636 W HCPCS: Performed by: HOSPITALIST

## 2019-04-30 PROCEDURE — 25000003 PHARM REV CODE 250: Performed by: HOSPITALIST

## 2019-04-30 PROCEDURE — 99233 SBSQ HOSP IP/OBS HIGH 50: CPT | Mod: ,,, | Performed by: NURSE PRACTITIONER

## 2019-04-30 RX ORDER — SODIUM CHLORIDE 9 MG/ML
INJECTION, SOLUTION INTRAVENOUS ONCE
Status: DISCONTINUED | OUTPATIENT
Start: 2019-04-30 | End: 2019-04-30 | Stop reason: HOSPADM

## 2019-04-30 RX ORDER — FUROSEMIDE 10 MG/ML
INJECTION INTRAMUSCULAR; INTRAVENOUS
Status: DISCONTINUED
Start: 2019-04-30 | End: 2019-04-30 | Stop reason: HOSPADM

## 2019-04-30 RX ORDER — SODIUM CHLORIDE 9 MG/ML
INJECTION, SOLUTION INTRAVENOUS
Status: DISCONTINUED | OUTPATIENT
Start: 2019-04-30 | End: 2019-04-30 | Stop reason: HOSPADM

## 2019-04-30 RX ORDER — LEVOFLOXACIN 250 MG/1
750 TABLET ORAL EVERY OTHER DAY
Status: DISCONTINUED | OUTPATIENT
Start: 2019-04-30 | End: 2019-04-30 | Stop reason: HOSPADM

## 2019-04-30 RX ORDER — SODIUM POLYSTYRENE SULFONATE 4.1 MEQ/G
45 POWDER, FOR SUSPENSION ORAL; RECTAL ONCE
Status: COMPLETED | OUTPATIENT
Start: 2019-04-30 | End: 2019-04-30

## 2019-04-30 RX ORDER — FUROSEMIDE 10 MG/ML
80 INJECTION INTRAMUSCULAR; INTRAVENOUS ONCE
Status: COMPLETED | OUTPATIENT
Start: 2019-04-30 | End: 2019-04-30

## 2019-04-30 RX ORDER — FUROSEMIDE 10 MG/ML
120 INJECTION INTRAMUSCULAR; INTRAVENOUS ONCE
Status: COMPLETED | OUTPATIENT
Start: 2019-04-30 | End: 2019-04-30

## 2019-04-30 RX ORDER — LEVOFLOXACIN 750 MG/1
750 TABLET ORAL EVERY OTHER DAY
Qty: 3 TABLET | Refills: 0 | Status: ON HOLD | OUTPATIENT
Start: 2019-05-02 | End: 2021-02-16 | Stop reason: HOSPADM

## 2019-04-30 RX ADMIN — AMLODIPINE BESYLATE 10 MG: 10 TABLET ORAL at 09:04

## 2019-04-30 RX ADMIN — DOLUTEGRAVIR SODIUM 50 MG: 50 TABLET, FILM COATED ORAL at 09:04

## 2019-04-30 RX ADMIN — SEVELAMER CARBONATE 1600 MG: 800 TABLET, FILM COATED ORAL at 01:04

## 2019-04-30 RX ADMIN — FUROSEMIDE 80 MG: 10 INJECTION, SOLUTION INTRAMUSCULAR; INTRAVENOUS at 08:04

## 2019-04-30 RX ADMIN — LEVETIRACETAM 500 MG: 500 TABLET ORAL at 09:04

## 2019-04-30 RX ADMIN — LEVOFLOXACIN 750 MG: 250 TABLET, FILM COATED ORAL at 09:04

## 2019-04-30 RX ADMIN — CARVEDILOL 3.12 MG: 3.12 TABLET, FILM COATED ORAL at 09:04

## 2019-04-30 RX ADMIN — SEVELAMER CARBONATE 1600 MG: 800 TABLET, FILM COATED ORAL at 09:04

## 2019-04-30 RX ADMIN — FUROSEMIDE 120 MG: 10 INJECTION, SOLUTION INTRAMUSCULAR; INTRAVENOUS at 01:04

## 2019-04-30 RX ADMIN — PRAVASTATIN SODIUM 40 MG: 40 TABLET ORAL at 09:04

## 2019-04-30 RX ADMIN — FLUOXETINE HYDROCHLORIDE 40 MG: 20 CAPSULE ORAL at 09:04

## 2019-04-30 RX ADMIN — SODIUM POLYSTYRENE SULFONATE 45 G: 1 POWDER ORAL; RECTAL at 04:04

## 2019-04-30 RX ADMIN — PANTOPRAZOLE SODIUM 40 MG: 40 TABLET, DELAYED RELEASE ORAL at 09:04

## 2019-04-30 RX ADMIN — DARUNAVIR 800 MG: 800 TABLET, FILM COATED ORAL at 09:04

## 2019-04-30 RX ADMIN — DAPSONE 100 MG: 100 TABLET ORAL at 09:04

## 2019-04-30 NOTE — PLAN OF CARE
Transportation arranged through [employer] [facility] ([phone]) transportation service.  Cezar reports that there is a 3 hr window (3:30pm - 6:30pm) for ride to come.  The confirmation # [id number].  Nurse'station # [phone] was given to Cezar .  Cezar will call SW w/name of transportation co.      [employer] is the assigned co that will provide ride home.  ETA for [employer] is 5:00pm.  BRIAN, nurse [id number] has been notified of ETA for transport.     Kenan Aparicio LMSW  [facility]  [id number]

## 2019-04-30 NOTE — HPI
52 y/o  male with PMHx ESRD on HD and AoCD presented for eval of confusion improved with initiation of PRBC infusion.    History obtained from the patient and the medical chart    Nephrology consulted for ESRD Management
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
## 2019-04-30 NOTE — NURSING
Discharge instructions given to patient.  Patient verbalized understanding and has no further questions.  Patient's Ivs, tele removed and vital signs within normal limits.  Patient now awaiting wheelchair for ride home.  Will continue to monitor.

## 2019-04-30 NOTE — NURSING
Patient arrived from ED on bed with transport.  Patient lethargic and falls asleep when holding a conversation.  Patient also tachypniec with respiratory rate of 25-28 and tachycardic with -108.  MD, charge nurse, and CCRN notified.  New orders were to administer 80mg IV lasix and patient will receive dialysis today.

## 2019-04-30 NOTE — PLAN OF CARE
PCP: James Torres MD CM met with patient to complete discharge assessment and planning. Patient currently lives at [facility]; plans to return post hospital stay. Patient receives dialysis via [facility], M/W/F,11:30am. Patient utilizes Medicaid transportation service for schaeduled dialysis appts. Pt request CM to  assist with transportation home post hospital stay. CM informed SW of pt's request for transportation assistance and previous services pt utilized. CM and SW will continue to follow for discharge needs.     Pharmacy:   [id number] - [place], [place] - [address]  [address] [address]  Phone: [phone] Fax: [phone]    [facility] - [place], [place] - [address] ([address]) [facility]  [address] [facility] [address]  Phone: [phone] Fax: [phone]    Payor: MEDICAID / Plan: [facility] COMMUNITY PLAN [facility] ([place] MEDICAID) / Product Type: Managed Medicaid /         04/30/19 1115   Discharge Assessment   Assessment Type Discharge Planning Assessment   Confirmed/corrected address and phone number on facesheet? Yes   Assessment information obtained from? Patient   Communicated expected length of stay with patient/caregiver yes   Prior to hospitilization cognitive status: Alert/Oriented   Prior to hospitalization functional status: Independent   Current cognitive status: Alert/Oriented   Current Functional Status: Independent   Lives With other (see comments)  (Assisted Living Facility)   Able to Return to Prior Arrangements yes   Is patient able to care for self after discharge? Yes   Patient's perception of discharge disposition assisted living  (return to Assisted Living facility)   Readmission Within the Last 30 Days no previous admission in last 30 days   Patient currently being followed by outpatient case management? Yes   If yes, name of outpatient case management  following: other (comments)  ([facility])   Patient currently receives any other outside agency services? Yes   Name and contact number of agency or person providing outside services [facility]   Equipment Currently Used at Home none   Do you have any problems affording any of your prescribed medications? TBD   Is the patient taking medications as prescribed? yes   Does the patient have transportation home? No   Dialysis Name and Scheduled days [facility] M-W-F 1130am   Does the patient receive services at the [facility]? No   Discharge Plan A Assisted Living  (return)   DME Needed Upon Discharge  none   Patient/Family in Agreement with Plan yes

## 2019-04-30 NOTE — NURSING
Patient now a,a,ox4, resting comfortably and requesting a breakfast tray.  Patient vital signs within normal limits and has no complaints at this time.  MD at bedside, and patient will be discharged today after dialysis.  Will continue to monitor.

## 2019-04-30 NOTE — ED NOTES
Pt care assumed. Report received from Anupama GANNON. Pt resting on stretcher in NAD. Pt ambulatory to bathroom independently. Pt refusing to keep monitoring equipment on. VS obtained intermittently. Call bell within reach. Side rails up X2. Bed low and locked.     Patient identifiers verified and correct for Greyson Tristan III.    LOC: The patient is awake, alert and aware of environment with an appropriate affect, the patient is oriented x 3 and speaking appropriately.  APPEARANCE: Patient resting comfortably and in no acute distress, patient is clean and well groomed, patient's clothing is properly fastened. Pt sleepy but arousable.   SKIN: The skin is warm and dry, color consistent with ethnicity, patient has normal skin turgor and moist mucus membranes, skin intact, no breakdown or bruising noted.  MUSCULOSKELETAL: Patient moving all extremities spontaneously, no obvious swelling or deformities noted.  RESPIRATORY: Airway is open and patent, respirations are spontaneous, patient has a normal effort and rate, no accessory muscle use noted  CARDIAC: Patient has a normal rate and regular rhythm, no periphreal edema noted, capillary refill < 3 seconds.  ABDOMEN: Soft and non tender to palpation, no distention noted, normoactive bowel sounds present in all four quadrants.  NEUROLOGIC: PERRL, 3mm bilaterally, eyes open spontaneously, behavior appropriate to situation, follows commands, facial expression symmetrical, bilateral hand grasp equal and even, purposeful motor response noted, normal sensation in all extremities when touched with a finger.

## 2019-04-30 NOTE — NURSING
Patient adamant about being discharged today and having dialysis tomorrow at his outpatient facility.  MD notified, new orders were to give 45g sodium polystyrene x1 and discharge patient.  Will administer and continue to monitor.

## 2019-04-30 NOTE — DISCHARGE SUMMARY
"Hospital Medicine  Discharge Summary      Patient Name: Greyson Hudson III  MRN:  [id number]  Hospital Medicine Team: [facility] S Anupama Campos MD  Date of Admission:  4/29/2019     Date of Discharge:  04/30/2019  Length of Stay:  LOS: 1 day   Principal Problem:  Acute encephalopathy     Active Hospital Problems    Diagnosis  POA    *Acute encephalopathy [G93.40]  Yes    Benign hypertensive heart and kidney disease and ESRD [I13.11, N18.6]  Yes    Chronic kidney disease-mineral and bone disorder [N18.9, E83.9, M89.9]  Yes    Anemia in ESRD (end-stage renal disease) [N18.6, D63.1]  Yes    Seizure disorder [G40.909]  Yes    HIV disease [B20]  Yes     Chronic    Anemia of chronic renal failure, stage 5 [N18.5, D63.1]  Yes     Chronic    ESRD on dialysis [N18.6, Z99.2]  Not Applicable     Chronic    Essential hypertension [I10]  Yes     Chronic      Resolved Hospital Problems   No resolved problems to display.          History of Present Illness:  Mr. Greyson Hudson III is a 53 y.o. male with ESRD on HD MWF, HIV on ART therapy (CD4 unknown), and anemia of chronic renal failure who presents to the ED via EMS for evaluation of confusion.  He endorses recently being started on Gabapentin, and mentions that that he hasn't been doing well since starting the medication.  He endorses feeling drowsy, and per his  he was found to be disoriented, "talking out of his head."  He endorses a dry cough, but no SOB, fevers, or chills.  Because of his confusion, he missed his HD session today, but has once scheduled for tomorrow.     In the ED, he was found to be satting 93% on room air.  Labs were notable for a hemoglobin 7.4.  He was transfused 1 unit PRBC and was admitted to Hospital Medicine for further management.  Of note, patient is requesting to go home after he gets his blood - claiming he is back to baseline and has HD scheduled for tomorrow.          Hospital Course of Principle Problem Addressed:  Mr. Rubi" Tirstan SCHUMACHER was admitted to Hospital Medicine for management of his acute encephalopathy.  UDS returned positive for cocaine.  He was given 1 unit PRBCs for a hemoglobin of 7.4 with an appropriate response to 9.4.  Nephro was consulted, who plan for HD on 4/30, however it wouldn't get performed until the evening of 4/30.  His mental status returned back to baseline and patient was agitated that he hasn't received HD, when he scheduled to get a session at his home center on 4/30.  His mental status returned to baseline.  He was given Kayexalate and Lasix 120mg IV for a K 5.4 to decrease his level before HD on 5/1 at 11am at his home center.          Other Medical Problems Addressed in the Hospital:      Acute Encephalopathy  · Likely 2/2 Gabapentin - Hold medication - but UDS positive for cocaine  · Reports being back to baseline and requesting to go home, but nursing reports some somnolence and lethargy this morning.  · Concern for uremia - plan for HD today  · Monitor mental status     ESRD on HD  · HD MW, last session 4/26.  Plan for HD today with concern for uremia  · Nephro consulted     Anemia of Chronic Renal Failure, Stage 45  Anemia in ESRD  · Hgb 7.4 on admit, gradually trending down.  S/p 1 unit PRBC with appropriate response with hemoglobin 9.3     Chronic Kidney Disease-Mineral and Bone Disorder  · Chronic and stable  · Continue Renvela 1600mg PO TID     HIV  · Check CD4 count  · Continue Darunavir, Tivicay  · Continue Dapsone     Seizure Disorder  · Chronic and stable  · Continue Keppra 500mg PO BID  · Seizure precautions     Essential HTN  · Chronic and stable  · Continue Norvasc 10mg PO daily  · Continue Coreg 3.125mg PO BID     Depression  · Chronic and stable  · Continue Prozac 40mg PO daily  · Continue Remeron 15mg PO qHS     Benign Hypertensive Heart and Kidney Disease with ESRD  · As above          Significant Diagnostic Tests/Imaging:    Recent Labs   Lab 04/29/19  1315 04/30/19  0342   WBC 5.79  5.61   HGB 7.4* 9.3*   HCT 24.9* 30.9*    261     Recent Labs   Lab 04/29/19  1315 04/30/19  0342 04/30/19  1404    139 139   K 5.1 5.6* 5.4*   CL 98 100 99   CO2 25 23 25   BUN 58* 67* 72*   CREATININE 13.3* 14.0* 15.5*   GLU 86 81 105   CALCIUM 8.8 9.3 9.4   MG  --  2.3  --    PHOS  --  5.8*  --      Recent Labs   Lab 04/29/19  1315 04/30/19  0342   ALKPHOS 89 74   ALT 10 9*   AST 18 15   ALBUMIN 2.3* 2.4*   PROT 7.8 7.9   BILITOT 0.3 0.4      No results for input(s): POCTGLUCOSE in the last 168 hours.  No results for input(s): CPK, CPKMB, MB, TROPONINI in the last 72 hours.    No results for input(s): LACTATE in the last 72 hours.       Consultants:  Nephro        Discharge Medications:    Discharge Medication List as of 4/30/2019 10:57 AM      START taking these medications    Details   levoFLOXacin (LEVAQUIN) 750 MG tablet Take 1 tablet (750 mg total) by mouth every other day., Starting u 5/2/2019, Normal         CONTINUE these medications which have NOT CHANGED    Details   amlodipine (NORVASC) 10 MG tablet Take 1 tablet (10 mg total) by mouth once daily., Starting 11/5/2016, Until Discontinued, Print      carvedilol (COREG) 3.125 MG tablet Take 3.125 mg by mouth 2 (two) times daily with meals., Historical Med      dapsone 100 MG Tab Take 100 mg by mouth once daily., Historical Med      darunavir-cobicistat (PREZCOBIX) 800-150 mg-mg Tab Take 1 tablet by mouth once daily., Historical Med      dolutegravir (TIVICAY) 50 mg Tab Take 50 mg by mouth once daily., Historical Med      FLUoxetine (PROZAC) 40 MG capsule Take 40 mg by mouth once daily., Historical Med      levETIRAcetam (KEPPRA) 500 MG Tab Take 1 tablet (500 mg total) by mouth 2 (two) times daily., Starting Mon 11/26/2018, Until Wed 12/26/2018, Normal      mirtazapine (REMERON) 15 MG tablet Take 15 mg by mouth every evening., Historical Med      pantoprazole (PROTONIX) 40 MG tablet Take 1 tablet (40 mg total) by mouth once daily., Starting  Wed 1/10/2018, Until Thu 1/10/2019, Normal      patiromer calcium sorbitex (VELTASSA) 8.4 gram PwPk Take 8.4 g by mouth once daily., Historical Med      pravastatin (PRAVACHOL) 40 MG tablet Take 40 mg by mouth once daily., Historical Med      sevelamer carbonate (RENVELA) 800 mg Tab Take 2 tablets (1,600 mg total) by mouth 3 (three) times daily with meals., Starting 11/5/2016, Until Discontinued, Print      triamcinolone acetonide 0.1% (KENALOG) 0.1 % cream Apply topically once daily., Historical Med         STOP taking these medications       oxyCODONE-acetaminophen (PERCOCET)  mg per tablet Comments:   Reason for Stopping:               Discharge Diet:  renal diet     Activity: activity as tolerated    Discharge Condition: Stable    Disposition: Home or Self Care     Time spent on the discharge of the patient including review of hospital course with the patient. reviewing discharge medications and arranging follow-up care 35 minutes.  Patient was seen and examined on the date of discharge and determined to be suitable for discharge.    No future appointments.    Anupama Campos MD  [facility]  Cell:  [phone]  Spectra:  [phone]  Pager:  [phone]

## 2019-04-30 NOTE — PROGRESS NOTES
Hospital Medicine  Progress Note      Patient Name: Greyson Hudson III  MRN:  [id number]  [facility] Medicine Team: [facility] MED S Anupama Campos MD  Date of Admission:  4/29/2019     Length of Stay:  LOS: 1 day   Expected Discharge Date: 4/30/2019  Principal Problem:  Acute encephalopathy      Subjective:    Interval History/Overnight Events:  Admitted to Hospital Medicine overnight.  Received a unit of PRBCs with appropriate response.  Didn't receive HD overnight.  This morning with continued confusion and somnolence per nursing.  Patient requesting to go home today after HD.      Review of Systems:  Constitutional: Negative for chills, fatigue, fever.   Respiratory: + cough  Cardiovascular: Negative for chest pain, palpitations, leg swelling.   Gastrointestinal: Negative for abdominal pain, constipation, diarrhea, nausea, vomiting.   Genitourinary: Negative for dysuria, frequency.   All other systems reviewed and are negative.      Objective:    Physical Exam:  Temp:  [98 °F (36.7 °C)-98.7 °F (37.1 °C)]   Pulse:  []   Resp:  [14-20]   BP: (113-167)/(60-86)   SpO2:  [88 %-100 %]     Intake/Output Summary (Last 24 hours) at 4/30/2019 1104  Last data filed at 4/29/2019 1731  Gross per 24 hour   Intake 802 ml   Output --   Net 802 ml       Constitutional: Appears well-developed and well-nourished.   Eyes: No scleral icterus or eye discharge  Cardiovascular: Normal heart rate.  Regular heart rhythm.  No murmur heard.  Pulmonary/Chest: Tachypnic.  Comfortable on oxygen.  Rales.  Abdominal: Soft. Bowel sounds are normal.  No distension.  No tenderness  Musculoskeletal: No deformities.  No edema.   Neurological: Alert.  Oriented to person, place, and time.   Skin: Skin is warm and dry.       Assessment and Plan:    Mr. Greyson Hudson III is a 53 y.o. male who presented to [facility] on 4/29/2019 with acute encephalopathy.    Hospital Course:    Mr. Greyson Hudson III was admitted to Hospital Medicine for management of his acute  encephalopathy.  He was given 1 unit PRBCs for a hemoglobin of 7.4 with an appropriate response.  Nephro was consulted, who plan for HD on 4/30.    Acute Encephalopathy  · Likely 2/2 Gabapentin - Hold medication - but UDS positive for cocaine  · Reports being back to baseline and requesting to go home, but nursing reports some somnolence and lethargy this morning.  · Concern for uremia - plan for HD today  · Monitor mental status     ESRD on HD  · HD MWF, last session 4/26.  Plan for HD today with concern for uremia  · Nephro consulted     Anemia of Chronic Renal Failure, Stage 45  Anemia in ESRD  · Hgb 7.4 on admit, gradually trending down.  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

## 2019-04-30 NOTE — CARE UPDATE
"RAPID RESPONSE NURSE PROACTIVE ROUNDING NOTE     Time of Visit: 09      Admit Date: 2019  LOS: 1  Code Status: Full Code   Date of Visit: 2019  : 1965  Age: 53 y.o.  Sex: male  Race: Black or   Bed: 7069/7069 A:   MRN: [id number]  Was the patient discharged from an ICU this admission? no   Was the patient discharged from a PACU within last 24 hours?  no  Did the patient receive conscious sedation/general anesthesia in last 24 hours?  no  Was the patient in the ED within the past 24 hours?  yes  Was the patient started on NIPPV within the past 24 hours?  no  Attending Physician: Anupama Campos MD  Primary Service: [facility] [place] [facility]    ASSESSMENT     Abnormal Vital Signs: /66   Pulse 106   Temp 98.5 °F (36.9 °C)   Resp 14   Ht 5' 4" (1.626 m)   Wt 70.6 kg (155 lb 10.3 oz)   SpO2 95%   BMI 26.72 kg/m²    Clinical Issues: Neuro    Pt AAOX4. RASS 0. No complaints at this time.      INTERVENTIONS/ RECOMMENDATIONS     None at this time.     Discussed plan of care with RNBRIAN.    PHYSICIAN ESCALATION     Yes/No  no    Orders received and case discussed with .    Disposition: Remain in room 3295.    FOLLOW-UP     Call back the Rapid Response Nurse, Anisa Lan RN at [phone] for additional questions or concerns.            "

## 2019-04-30 NOTE — PLAN OF CARE
CM informed patient of today's discharge to [facility]. Patient in agreement with return to [facility]. Patient reports he has transportation arrangement for dialysis treatment scheduled tomorrow. SW will set up pt's transportation to [facility].       04/30/19 1400   Final Note   Assessment Type Final Discharge Note   Anticipated Discharge Disposition   ([facility])   What phone number can be called within the next 1-3 days to see how you are doing after discharge? [phone]   Discharge plans and expectations educations in teach back method with documentation complete? Yes

## 2019-04-30 NOTE — ASSESSMENT & PLAN NOTE
ESRD on HD  -HD TTS while IP  -Hep B panel, daily CBC, renal function panel  -Renal diet with 1 L FR/day  -Strict I/Os, daily standing weights, and chart  -Medications to renal parameter    Anemia  -Hgb 7.4  -1u PRBC infusing  -maintain Hgb >7  -managed by Emergency Medicine    HTN  - Maintain MAP >65  - Keep target Bp <140/90    MBD  -corrected Ca 10.16; Ph; PTH    Diet: renal with 1 L FR/day    Dialysis Information: iHD  -Out patient HD unit: [facility]  -Nephrologist: Dr. Restrepo  -HD tx days: MWF  -HD tx time: 3 hours  -HD access: AVG  -Last OP HD: Sat (2/2 missed tx Fri 2/2 diarrhea)  -EDW: unsure  -RRF: oliguric    AVG pain  -recommend VAS US  -discussed with Emergency Medicine    Possible AMA  Per Emergency Medicine, pt may be leaving AMA.  Patient was amenable to recommendations discussed regarding his nephrology care during this admission; discussed with Emergency Medicine

## 2019-04-30 NOTE — ED NOTES
Telemetry Verification   Patient placed on Telemetry Box  Verified with War Room  Box # [id number]   Monitor Tech Ibis   Rate 108   Rhythm Sinus Tach

## 2019-08-08 ENCOUNTER — HOSPITAL ENCOUNTER (OUTPATIENT)
Facility: HOSPITAL | Age: 54
Discharge: HOME | End: 2019-08-09
Attending: EMERGENCY MEDICINE | Admitting: INTERNAL MEDICINE
Payer: MEDICAID

## 2019-08-08 DIAGNOSIS — D64.9 ANEMIA, UNSPECIFIED TYPE: Primary | ICD-10-CM

## 2019-08-08 DIAGNOSIS — N18.6 ANEMIA IN ESRD (END-STAGE RENAL DISEASE): ICD-10-CM

## 2019-08-08 DIAGNOSIS — R56.9 SEIZURE: ICD-10-CM

## 2019-08-08 DIAGNOSIS — R05.9 COUGH: ICD-10-CM

## 2019-08-08 DIAGNOSIS — N18.6 ESRD (END STAGE RENAL DISEASE): ICD-10-CM

## 2019-08-08 DIAGNOSIS — D63.1 ANEMIA IN ESRD (END-STAGE RENAL DISEASE): ICD-10-CM

## 2019-08-08 DIAGNOSIS — K92.1 HEMATOCHEZIA: ICD-10-CM

## 2019-08-08 DIAGNOSIS — M79.606 LEG PAIN: ICD-10-CM

## 2019-08-08 LAB
ABO + RH BLD: NORMAL
ALBUMIN SERPL BCP-MCNC: 2.5 G/DL (ref 3.5–5.2)
ALP SERPL-CCNC: 99 U/L (ref 55–135)
ALT SERPL W/O P-5'-P-CCNC: 18 U/L (ref 10–44)
ANION GAP SERPL CALC-SCNC: 12 MMOL/L (ref 8–16)
AST SERPL-CCNC: 35 U/L (ref 10–40)
BASOPHILS # BLD AUTO: 0.01 K/UL (ref 0–0.2)
BASOPHILS NFR BLD: 0.2 % (ref 0–1.9)
BILIRUB SERPL-MCNC: 0.3 MG/DL (ref 0.1–1)
BLD GP AB SCN CELLS X3 SERPL QL: NORMAL
BLD PROD TYP BPU: NORMAL
BLOOD UNIT EXPIRATION DATE: NORMAL
BLOOD UNIT TYPE CODE: 5100
BLOOD UNIT TYPE: NORMAL
BUN SERPL-MCNC: 48 MG/DL (ref 6–20)
CALCIUM SERPL-MCNC: 7.6 MG/DL (ref 8.7–10.5)
CHLORIDE SERPL-SCNC: 100 MMOL/L (ref 95–110)
CK SERPL-CCNC: 409 U/L (ref 20–200)
CO2 SERPL-SCNC: 28 MMOL/L (ref 23–29)
CODING SYSTEM: NORMAL
CREAT SERPL-MCNC: 10 MG/DL (ref 0.5–1.4)
DIFFERENTIAL METHOD: ABNORMAL
DISPENSE STATUS: NORMAL
EOSINOPHIL # BLD AUTO: 0.2 K/UL (ref 0–0.5)
EOSINOPHIL NFR BLD: 4.9 % (ref 0–8)
ERYTHROCYTE [DISTWIDTH] IN BLOOD BY AUTOMATED COUNT: 17.7 % (ref 11.5–14.5)
EST. GFR  (AFRICAN AMERICAN): 6 ML/MIN/1.73 M^2
EST. GFR  (NON AFRICAN AMERICAN): 5 ML/MIN/1.73 M^2
GLUCOSE SERPL-MCNC: 104 MG/DL (ref 70–110)
HCT VFR BLD AUTO: 22 % (ref 40–54)
HGB BLD-MCNC: 6.7 G/DL (ref 14–18)
INR PPP: 1.2 (ref 0.8–1.2)
LACTATE SERPL-SCNC: 1.4 MMOL/L (ref 0.5–2.2)
LIPASE SERPL-CCNC: 143 U/L (ref 4–60)
LYMPHOCYTES # BLD AUTO: 1.1 K/UL (ref 1–4.8)
LYMPHOCYTES NFR BLD: 26 % (ref 18–48)
MAGNESIUM SERPL-MCNC: 2.3 MG/DL (ref 1.6–2.6)
MCH RBC QN AUTO: 29.1 PG (ref 27–31)
MCHC RBC AUTO-ENTMCNC: 30.5 G/DL (ref 32–36)
MCV RBC AUTO: 96 FL (ref 82–98)
MONOCYTES # BLD AUTO: 0.6 K/UL (ref 0.3–1)
MONOCYTES NFR BLD: 14 % (ref 4–15)
NEUTROPHILS # BLD AUTO: 2.2 K/UL (ref 1.8–7.7)
NEUTROPHILS NFR BLD: 55.1 % (ref 38–73)
PHOSPHATE SERPL-MCNC: 5.1 MG/DL (ref 2.7–4.5)
PLATELET # BLD AUTO: 141 K/UL (ref 150–350)
PMV BLD AUTO: 11.3 FL (ref 9.2–12.9)
POCT GLUCOSE: 98 MG/DL (ref 70–110)
POTASSIUM SERPL-SCNC: 4.4 MMOL/L (ref 3.5–5.1)
PROT SERPL-MCNC: 8.3 G/DL (ref 6–8.4)
PROTHROMBIN TIME: 12.2 SEC (ref 9–12.5)
RBC # BLD AUTO: 2.3 M/UL (ref 4.6–6.2)
SODIUM SERPL-SCNC: 140 MMOL/L (ref 136–145)
TRANS ERYTHROCYTES VOL PATIENT: NORMAL ML
WBC # BLD AUTO: 4.08 K/UL (ref 3.9–12.7)

## 2019-08-08 PROCEDURE — 84100 ASSAY OF PHOSPHORUS: CPT

## 2019-08-08 PROCEDURE — 93010 EKG 12-LEAD: ICD-10-PCS | Mod: ,,, | Performed by: INTERNAL MEDICINE

## 2019-08-08 PROCEDURE — P9021 RED BLOOD CELLS UNIT: HCPCS

## 2019-08-08 PROCEDURE — 25000003 PHARM REV CODE 250: Performed by: INTERNAL MEDICINE

## 2019-08-08 PROCEDURE — 83605 ASSAY OF LACTIC ACID: CPT

## 2019-08-08 PROCEDURE — 93005 ELECTROCARDIOGRAM TRACING: CPT

## 2019-08-08 PROCEDURE — 82550 ASSAY OF CK (CPK): CPT

## 2019-08-08 PROCEDURE — 83690 ASSAY OF LIPASE: CPT

## 2019-08-08 PROCEDURE — 82272 OCCULT BLD FECES 1-3 TESTS: CPT

## 2019-08-08 PROCEDURE — G0378 HOSPITAL OBSERVATION PER HR: HCPCS

## 2019-08-08 PROCEDURE — 99285 EMERGENCY DEPT VISIT HI MDM: CPT | Mod: 25

## 2019-08-08 PROCEDURE — 83735 ASSAY OF MAGNESIUM: CPT

## 2019-08-08 PROCEDURE — G0257 UNSCHED DIALYSIS ESRD PT HOS: HCPCS

## 2019-08-08 PROCEDURE — 80177 DRUG SCRN QUAN LEVETIRACETAM: CPT

## 2019-08-08 PROCEDURE — 86920 COMPATIBILITY TEST SPIN: CPT

## 2019-08-08 PROCEDURE — 85610 PROTHROMBIN TIME: CPT

## 2019-08-08 PROCEDURE — 82962 GLUCOSE BLOOD TEST: CPT

## 2019-08-08 PROCEDURE — 80053 COMPREHEN METABOLIC PANEL: CPT

## 2019-08-08 PROCEDURE — 96365 THER/PROPH/DIAG IV INF INIT: CPT | Mod: 59

## 2019-08-08 PROCEDURE — 86850 RBC ANTIBODY SCREEN: CPT

## 2019-08-08 PROCEDURE — 25000003 PHARM REV CODE 250: Performed by: EMERGENCY MEDICINE

## 2019-08-08 PROCEDURE — 85025 COMPLETE CBC W/AUTO DIFF WBC: CPT

## 2019-08-08 PROCEDURE — 94761 N-INVAS EAR/PLS OXIMETRY MLT: CPT

## 2019-08-08 PROCEDURE — 93010 ELECTROCARDIOGRAM REPORT: CPT | Mod: ,,, | Performed by: INTERNAL MEDICINE

## 2019-08-08 PROCEDURE — 63600175 PHARM REV CODE 636 W HCPCS: Performed by: EMERGENCY MEDICINE

## 2019-08-08 RX ORDER — LEVETIRACETAM 500 MG/1
500 TABLET ORAL 2 TIMES DAILY
Status: DISCONTINUED | OUTPATIENT
Start: 2019-08-08 | End: 2019-08-09 | Stop reason: HOSPADM

## 2019-08-08 RX ORDER — BENZONATATE 100 MG/1
200 CAPSULE ORAL 3 TIMES DAILY PRN
Status: DISCONTINUED | OUTPATIENT
Start: 2019-08-08 | End: 2019-08-09 | Stop reason: HOSPADM

## 2019-08-08 RX ORDER — ACETAMINOPHEN 500 MG
500 TABLET ORAL EVERY 6 HOURS PRN
Status: DISCONTINUED | OUTPATIENT
Start: 2019-08-08 | End: 2019-08-09 | Stop reason: HOSPADM

## 2019-08-08 RX ORDER — CARVEDILOL 3.12 MG/1
3.12 TABLET ORAL 2 TIMES DAILY WITH MEALS
Status: DISCONTINUED | OUTPATIENT
Start: 2019-08-08 | End: 2019-08-09 | Stop reason: HOSPADM

## 2019-08-08 RX ORDER — AMLODIPINE BESYLATE 5 MG/1
10 TABLET ORAL DAILY
Status: DISCONTINUED | OUTPATIENT
Start: 2019-08-09 | End: 2019-08-09 | Stop reason: HOSPADM

## 2019-08-08 RX ORDER — PRAVASTATIN SODIUM 40 MG/1
40 TABLET ORAL DAILY
Status: DISCONTINUED | OUTPATIENT
Start: 2019-08-09 | End: 2019-08-09 | Stop reason: HOSPADM

## 2019-08-08 RX ORDER — SODIUM CHLORIDE 9 MG/ML
INJECTION, SOLUTION INTRAVENOUS ONCE
Status: DISCONTINUED | OUTPATIENT
Start: 2019-08-08 | End: 2019-08-08

## 2019-08-08 RX ORDER — HYDROCODONE BITARTRATE AND ACETAMINOPHEN 500; 5 MG/1; MG/1
TABLET ORAL
Status: DISCONTINUED | OUTPATIENT
Start: 2019-08-08 | End: 2019-08-09 | Stop reason: HOSPADM

## 2019-08-08 RX ORDER — SODIUM CHLORIDE 0.9 % (FLUSH) 0.9 %
10 SYRINGE (ML) INJECTION
Status: DISCONTINUED | OUTPATIENT
Start: 2019-08-08 | End: 2019-08-09 | Stop reason: HOSPADM

## 2019-08-08 RX ORDER — MUPIROCIN 20 MG/G
OINTMENT TOPICAL 2 TIMES DAILY
Status: DISCONTINUED | OUTPATIENT
Start: 2019-08-08 | End: 2019-08-09 | Stop reason: HOSPADM

## 2019-08-08 RX ORDER — PANTOPRAZOLE SODIUM 40 MG/1
40 TABLET, DELAYED RELEASE ORAL DAILY
Status: DISCONTINUED | OUTPATIENT
Start: 2019-08-09 | End: 2019-08-09 | Stop reason: HOSPADM

## 2019-08-08 RX ORDER — FLUOXETINE HYDROCHLORIDE 20 MG/1
40 CAPSULE ORAL DAILY
Status: DISCONTINUED | OUTPATIENT
Start: 2019-08-09 | End: 2019-08-09 | Stop reason: HOSPADM

## 2019-08-08 RX ORDER — SEVELAMER CARBONATE 800 MG/1
1600 TABLET, FILM COATED ORAL
Status: DISCONTINUED | OUTPATIENT
Start: 2019-08-08 | End: 2019-08-09 | Stop reason: HOSPADM

## 2019-08-08 RX ORDER — SODIUM CHLORIDE 9 MG/ML
INJECTION, SOLUTION INTRAVENOUS
Status: DISCONTINUED | OUTPATIENT
Start: 2019-08-08 | End: 2019-08-09 | Stop reason: HOSPADM

## 2019-08-08 RX ORDER — ONDANSETRON 2 MG/ML
4 INJECTION INTRAMUSCULAR; INTRAVENOUS EVERY 6 HOURS PRN
Status: DISCONTINUED | OUTPATIENT
Start: 2019-08-08 | End: 2019-08-09 | Stop reason: HOSPADM

## 2019-08-08 RX ADMIN — MUPIROCIN: 20 OINTMENT TOPICAL at 09:08

## 2019-08-08 RX ADMIN — LEVETIRACETAM 500 MG: 500 TABLET ORAL at 09:08

## 2019-08-08 RX ADMIN — SEVELAMER CARBONATE 1600 MG: 800 TABLET, FILM COATED ORAL at 04:08

## 2019-08-08 RX ADMIN — BENZONATATE 200 MG: 100 CAPSULE ORAL at 11:08

## 2019-08-08 RX ADMIN — PROMETHAZINE HYDROCHLORIDE 12.5 MG: 25 INJECTION INTRAMUSCULAR; INTRAVENOUS at 02:08
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
## 2019-08-08 NOTE — ASSESSMENT & PLAN NOTE
Patient unsure of what medications he takes at home. Unable to be verified with his pharmacy as he has not filled in over 1 year. Instructed patient to bring home medications to verify. CD4 count of 278 in April 2019

## 2019-08-08 NOTE — ASSESSMENT & PLAN NOTE
Hemoglobin today of 6.7. Will transfuse 1 unit of blood on HD. Type and screen  Iron studies pending
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
## 2019-08-08 NOTE — NURSING
Lab called to notify of blood ready, Patient will be receiving blood in dialysis. Will  blood when patient sent to dialysis and dialysis nurse ready for blood.

## 2019-08-08 NOTE — NURSING
Patient arrived to unit via wheelchair. Patient very sleepy, falling asleep in wheelchair. Awakens easily. Assisted to bed with standby assistance. Complains of hunger. No acute distress noted. Will continue to monitor.

## 2019-08-08 NOTE — ASSESSMENT & PLAN NOTE
Fair control. Appears patient is on amlodipine/coreg, but unable to be verified as he is unsure. His pharmacy on file with [facility] has not filled since 5/2018, other pharmacy listed does not answer.

## 2019-08-08 NOTE — CONSULTS
Dictation #1  MRN:[id number]  CSN:[id number]  [id number]    esrd tts Latanya Pool  htn  Anemia 2nd to esrd/hiv  sz  2nd pth

## 2019-08-08 NOTE — ED TRIAGE NOTES
Pt presents to ED via EMS by self; pt c/o seizure that occurred 20 minutes into dialysis today; pt has hx of seizures and states he is a compliant with his seizure meds; pt is aaoX4; in NAD; VSS; will continue to monitor
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
## 2019-08-08 NOTE — HOSPITAL COURSE
Greyson Hudson III 53 y.o. male admitted to observation for seizures during dialysis and anemia anemia. Question compliance with keppra at home. In the ED, hemoglobin 6.7, potassium 4.4, Cr of 10, , lactate 1.4, chest x-ray without acute process. Patient underwent hemodialysis 3 hrs with 2.9 L removed with no issues. Received 1 unit of PRBC during dialysis. ODILIA AVG good bruit. Levetiracetam level pending on discharge. Encourage compliance with all medications. Keppra Rx sent to pharmacy on discharge. Instruct to avoid taking Keppra before dialysis on dialysis days. Patient remained with no neuro deficit and no occurrence during stay. Patient stable for discharge home.

## 2019-08-09 VITALS
OXYGEN SATURATION: 93 % | HEART RATE: 88 BPM | TEMPERATURE: 98 F | WEIGHT: 162.06 LBS | DIASTOLIC BLOOD PRESSURE: 87 MMHG | HEIGHT: 66 IN | SYSTOLIC BLOOD PRESSURE: 160 MMHG | BODY MASS INDEX: 26.05 KG/M2 | RESPIRATION RATE: 18 BRPM

## 2019-08-09 LAB
ALBUMIN SERPL BCP-MCNC: 2.6 G/DL (ref 3.5–5.2)
ALP SERPL-CCNC: 91 U/L (ref 55–135)
ALT SERPL W/O P-5'-P-CCNC: 14 U/L (ref 10–44)
ANION GAP SERPL CALC-SCNC: 12 MMOL/L (ref 8–16)
AST SERPL-CCNC: 31 U/L (ref 10–40)
BASOPHILS # BLD AUTO: 0.02 K/UL (ref 0–0.2)
BASOPHILS NFR BLD: 0.3 % (ref 0–1.9)
BILIRUB SERPL-MCNC: 0.4 MG/DL (ref 0.1–1)
BUN SERPL-MCNC: 30 MG/DL (ref 6–20)
CALCIUM SERPL-MCNC: 8.2 MG/DL (ref 8.7–10.5)
CHLORIDE SERPL-SCNC: 101 MMOL/L (ref 95–110)
CK SERPL-CCNC: 391 U/L (ref 20–200)
CO2 SERPL-SCNC: 26 MMOL/L (ref 23–29)
CREAT SERPL-MCNC: 7.1 MG/DL (ref 0.5–1.4)
DIFFERENTIAL METHOD: ABNORMAL
EOSINOPHIL # BLD AUTO: 0.2 K/UL (ref 0–0.5)
EOSINOPHIL NFR BLD: 4.1 % (ref 0–8)
ERYTHROCYTE [DISTWIDTH] IN BLOOD BY AUTOMATED COUNT: 16.8 % (ref 11.5–14.5)
EST. GFR  (AFRICAN AMERICAN): 9 ML/MIN/1.73 M^2
EST. GFR  (NON AFRICAN AMERICAN): 8 ML/MIN/1.73 M^2
FERRITIN SERPL-MCNC: 735 NG/ML (ref 20–300)
GLUCOSE SERPL-MCNC: 87 MG/DL (ref 70–110)
HCT VFR BLD AUTO: 31.1 % (ref 40–54)
HGB BLD-MCNC: 9.6 G/DL (ref 14–18)
IRON SERPL-MCNC: 41 UG/DL (ref 45–160)
LYMPHOCYTES # BLD AUTO: 1.2 K/UL (ref 1–4.8)
LYMPHOCYTES NFR BLD: 20 % (ref 18–48)
MCH RBC QN AUTO: 29.1 PG (ref 27–31)
MCHC RBC AUTO-ENTMCNC: 30.9 G/DL (ref 32–36)
MCV RBC AUTO: 94 FL (ref 82–98)
MONOCYTES # BLD AUTO: 0.9 K/UL (ref 0.3–1)
MONOCYTES NFR BLD: 14.5 % (ref 4–15)
NEUTROPHILS # BLD AUTO: 3.6 K/UL (ref 1.8–7.7)
NEUTROPHILS NFR BLD: 61.1 % (ref 38–73)
PHOSPHATE SERPL-MCNC: 4.3 MG/DL (ref 2.7–4.5)
PLATELET # BLD AUTO: 154 K/UL (ref 150–350)
PMV BLD AUTO: 10.8 FL (ref 9.2–12.9)
POTASSIUM SERPL-SCNC: 3.7 MMOL/L (ref 3.5–5.1)
PROT SERPL-MCNC: 8.4 G/DL (ref 6–8.4)
RBC # BLD AUTO: 3.3 M/UL (ref 4.6–6.2)
SATURATED IRON: 17 % (ref 20–50)
SODIUM SERPL-SCNC: 139 MMOL/L (ref 136–145)
TOTAL IRON BINDING CAPACITY: 235 UG/DL (ref 250–450)
TRANSFERRIN SERPL-MCNC: 159 MG/DL (ref 200–375)
WBC # BLD AUTO: 5.85 K/UL (ref 3.9–12.7)

## 2019-08-09 PROCEDURE — 96375 TX/PRO/DX INJ NEW DRUG ADDON: CPT

## 2019-08-09 PROCEDURE — 84100 ASSAY OF PHOSPHORUS: CPT

## 2019-08-09 PROCEDURE — 36415 COLL VENOUS BLD VENIPUNCTURE: CPT

## 2019-08-09 PROCEDURE — 25000003 PHARM REV CODE 250: Performed by: EMERGENCY MEDICINE

## 2019-08-09 PROCEDURE — 82728 ASSAY OF FERRITIN: CPT

## 2019-08-09 PROCEDURE — 80053 COMPREHEN METABOLIC PANEL: CPT

## 2019-08-09 PROCEDURE — 99203 PR OFFICE/OUTPT VISIT, NEW, LEVL III, 30-44 MIN: ICD-10-PCS | Mod: ,,, | Performed by: PSYCHIATRY & NEUROLOGY

## 2019-08-09 PROCEDURE — 25000003 PHARM REV CODE 250: Performed by: INTERNAL MEDICINE

## 2019-08-09 PROCEDURE — G0378 HOSPITAL OBSERVATION PER HR: HCPCS

## 2019-08-09 PROCEDURE — 85025 COMPLETE CBC W/AUTO DIFF WBC: CPT

## 2019-08-09 PROCEDURE — 82550 ASSAY OF CK (CPK): CPT

## 2019-08-09 PROCEDURE — 83540 ASSAY OF IRON: CPT

## 2019-08-09 PROCEDURE — 63600175 PHARM REV CODE 636 W HCPCS: Performed by: PHYSICIAN ASSISTANT

## 2019-08-09 PROCEDURE — 25000003 PHARM REV CODE 250: Performed by: PHYSICIAN ASSISTANT

## 2019-08-09 PROCEDURE — 99203 OFFICE O/P NEW LOW 30 MIN: CPT | Mod: ,,, | Performed by: PSYCHIATRY & NEUROLOGY

## 2019-08-09 RX ORDER — LEVETIRACETAM 500 MG/1
500 TABLET ORAL 2 TIMES DAILY
Qty: 60 TABLET | Refills: 11 | Status: SHIPPED | OUTPATIENT
Start: 2019-08-09 | End: 2021-06-25

## 2019-08-09 RX ADMIN — AMLODIPINE BESYLATE 10 MG: 5 TABLET ORAL at 08:08

## 2019-08-09 RX ADMIN — NEPHROCAP 1 CAPSULE: 1 CAP ORAL at 08:08

## 2019-08-09 RX ADMIN — SEVELAMER CARBONATE 1600 MG: 800 TABLET, FILM COATED ORAL at 11:08

## 2019-08-09 RX ADMIN — CARVEDILOL 3.12 MG: 3.12 TABLET, FILM COATED ORAL at 08:08

## 2019-08-09 RX ADMIN — ONDANSETRON 4 MG: 2 INJECTION INTRAMUSCULAR; INTRAVENOUS at 06:08

## 2019-08-09 RX ADMIN — FLUOXETINE 40 MG: 20 CAPSULE ORAL at 08:08

## 2019-08-09 RX ADMIN — LEVETIRACETAM 500 MG: 500 TABLET ORAL at 08:08

## 2019-08-09 RX ADMIN — SEVELAMER CARBONATE 1600 MG: 800 TABLET, FILM COATED ORAL at 08:08

## 2019-08-09 RX ADMIN — PANTOPRAZOLE SODIUM 40 MG: 40 TABLET, DELAYED RELEASE ORAL at 08:08

## 2019-08-09 RX ADMIN — PRAVASTATIN SODIUM 40 MG: 40 TABLET ORAL at 08:08

## 2019-08-09 NOTE — DISCHARGE SUMMARY
[facility] Hospital Medicine  Discharge Summary      Patient Name: Greyson Hudson III  MRN: [id number]  Admission Date: 8/8/2019  Hospital Length of Stay: 0 days  Discharge Date and Time:  08/09/2019 5:56 PM  Attending Physician: No att. providers found   Discharging Provider: Caterina Rowe NP  Primary Care Provider: James Torres MD      HPI:   Greyson Hudson III 53 y.o. male with HIV, ESRD T/TH/S, HIV, HTN, HLD presents to the hospital with a chief complaint of seizure. He reports experiencing a seizure on HD today. He is unsure of how long it lasted, he reports he returned to baseline after. He denies any complaints currently. He repors he immediately returned to baseline after seizure. He denies frequent seizure activity. He had one seizure 2 weeks ago, but reports prior to that he did not have one for many years. He is unsure of what medications he takes. He dialyzes for 3.5hrs at [facility]. He reports his access is a left arm graft. He still makes urine. He denies any benadryl use. He reports he does not sleep at night. He is unsure of his pre/post weight and fluid removal. He endorses seizure. He denies any other complaints. He denies any active bleeding.    In the ED, hemoglobin 6.7, potassium 4.4, Cr of 10, , lactate 1.4, chest x-ray without acute process.     * No surgery found *      Hospital Course:   Greyson Hudson III 53 y.o. male admitted to observation for seizures during dialysis and anemia anemia. Question compliance with keppra at home. In the ED, hemoglobin 6.7, potassium 4.4, Cr of 10, , lactate 1.4, chest x-ray without acute process. Patient underwent hemodialysis 3 hrs with 2.9 L removed with no issues. Received 1 unit of PRBC during dialysis. ODILIA AVG good bruit. Levetiracetam level pending on discharge. Encourage compliance with all medications. Keppra Rx sent to pharmacy on discharge. Instruct to avoid taking Keppra before dialysis on dialysis days.  Patient remained with no neuro deficit and no occurrence during stay. Patient stable for discharge home.      Consults:   Consults (From admission, onward)        Status Ordering Provider     Inpatient consult to Nephrology  Once     Provider:  Faith Espitia MD    Completed NAVI BOYLE          No new Assessment & Plan notes have been filed under this hospital service since the last note was generated.  Service: Hospital Medicine    Final Active Diagnoses:    Diagnosis Date Noted POA    PRINCIPAL PROBLEM:  Anemia in ESRD (end-stage renal disease) [N18.6, D63.1]  Yes    Seizure disorder [G40.909]  Yes    HIV disease [B20] 01/07/2018 Yes     Chronic    ESRD on dialysis [N18.6, Z99.2] 10/30/2016 Not Applicable     Chronic    Essential hypertension [I10]  Yes     Chronic      Problems Resolved During this Admission:       Discharged Condition: good    Disposition: Home or Self Care    Follow Up:  Follow-up Information     Breanne Veras MD On 8/29/2019.    Specialty:  Internal Medicine  Why:  Please keep appointment scheduled prior to coming to hospital for Thursday August 29th   Contact information:  [facility]  [facility] [address]  [phone]             [facility] .    Why:  please report to normally scheduled dialysis unit on scheduled days Tuesday/Thursday/Saturday               Patient Instructions:      Diet Cardiac     Diet renal     Notify your health care provider if you experience any of the following:  temperature >100.4     Notify your health care provider if you experience any of the following:  increased confusion or weakness     Notify your health care provider if you experience any of the following:  persistent dizziness, light-headedness, or visual disturbances     Activity as tolerated       Significant Diagnostic Studies: Labs:   BMP:   Recent Labs   Lab 08/08/19  1330 08/09/19  0344    87    139   K 4.4 3.7    101   CO2 28 26   BUN 48*  30*   CREATININE 10.0* 7.1*   CALCIUM 7.6* 8.2*   MG 2.3  --    , CMP   Recent Labs   Lab 08/08/19  1330 08/09/19  0344    139   K 4.4 3.7    101   CO2 28 26    87   BUN 48* 30*   CREATININE 10.0* 7.1*   CALCIUM 7.6* 8.2*   PROT 8.3 8.4   ALBUMIN 2.5* 2.6*   BILITOT 0.3 0.4   ALKPHOS 99 91   AST 35 31   ALT 18 14   ANIONGAP 12 12   ESTGFRAFRICA 6* 9*   EGFRNONAA 5* 8*   , CBC   Recent Labs   Lab 08/08/19  1330 08/09/19  0344   WBC 4.08 5.85   HGB 6.7* 9.6*   HCT 22.0* 31.1*   * 154   , INR   Lab Results   Component Value Date    INR 1.2 08/08/2019    INR 0.9 11/16/2018    INR 1.0 01/07/2018   , Lipid Panel   Lab Results   Component Value Date    TRIG 106 01/09/2018   , Troponin No results for input(s): TROPONINI in the last 168 hours. and A1C: No results for input(s): HGBA1C in the last 4320 hours.    Pending Diagnostic Studies:     Procedure Component Value Units Date/Time    Levetiracetam level [[id number]] Collected:  08/08/19 1330    Order Status:  Sent Lab Status:  In process Updated:  08/09/19 1055    Specimen:  Blood          Medications:  Reconciled Home Medications:      Medication List      CONTINUE taking these medications    amLODIPine 10 MG tablet  Commonly known as:  NORVASC  Take 1 tablet (10 mg total) by mouth once daily.     carvedilol 3.125 MG tablet  Commonly known as:  COREG  Take 3.125 mg by mouth 2 (two) times daily with meals.     dapsone 100 MG Tab  Take 100 mg by mouth once daily.     FLUoxetine 40 MG capsule  Take 40 mg by mouth once daily.     levETIRAcetam 500 MG Tab  Commonly known as:  KEPPRA  Take 1 tablet (500 mg total) by mouth 2 (two) times daily.     levoFLOXacin 750 MG tablet  Commonly known as:  LEVAQUIN  Take 1 tablet (750 mg total) by mouth every other day.     mirtazapine 15 MG tablet  Commonly known as:  REMERON  Take 15 mg by mouth every evening.     pantoprazole 40 MG tablet  Commonly known as:  PROTONIX  Take 1 tablet (40 mg total) by mouth once  daily.     patiromer calcium sorbitex 8.4 gram Pwpk  Commonly known as:  VELTASSA  Take 8.4 g by mouth once daily.     pravastatin 40 MG tablet  Commonly known as:  PRAVACHOL  Take 40 mg by mouth once daily.     PREZCOBIX 800-150 mg-mg Tab  Generic drug:  darunavir-cobicistat  Take 1 tablet by mouth once daily.     sevelamer carbonate 800 mg Tab  Commonly known as:  RENVELA  Take 2 tablets (1,600 mg total) by mouth 3 (three) times daily with meals.     TIVICAY 50 mg Tab  Generic drug:  dolutegravir  Take 50 mg by mouth once daily.     triamcinolone acetonide 0.1% 0.1 % cream  Commonly known as:  KENALOG  Apply topically once daily.            Indwelling Lines/Drains at time of discharge:   Lines/Drains/Airways     Drain                 Hemodialysis AV Fistula Left upper arm -- days                Time spent on the discharge of patient: 35 minutes  Patient was seen and examined on the date of discharge and determined to be suitable for discharge.         Caterina Rowe NP  Department of Hospital Medicine  [facility]

## 2019-08-09 NOTE — PLAN OF CARE
"   08/09/19 1428   Final Note   Assessment Type Final Discharge Note   Anticipated Discharge Disposition Home  ([facility])   Hospital Follow Up  Appt(s) scheduled? Yes   Discharge plans and expectations educations in teach back method with documentation complete? Yes   Right Care Referral Info   Post Acute Recommendation No Care   pts nurse Saida notified that pt can d/c from CM standpoint.    EDUCATION:   provided with educational information on seizures.  Information reviewed and placed in :My Healthcare Packet" to be brought home  to use as resource after discharge.  Information included:  signs and symptoms to look for and call the doctor if experiencing, and symptoms that may indicate a medical emergency: CALL 911.      All questions answered.  Teach back method used.    Patient stated, " will go to scheduled follow up and take medications as prescribed ".        "

## 2019-08-09 NOTE — PLAN OF CARE
Problem: Fall Injury Risk  Goal: Absence of Fall and Fall-Related Injury  Outcome: Ongoing (interventions implemented as appropriate)  Intervention: Identify and Manage Contributors to Fall Injury Risk     08/08/19 1941   Manage Acute Allergic Reaction   Medication Review/Management medications reviewed   Identify and Manage Contributors to Fall Injury Risk   Self-Care Promotion independence encouraged;BADL personal routines maintained     Intervention: Promote Injury-Free Environment     08/08/19 1941   Optimize [place] and Functional Mobility   Environmental Safety Modification assistive device/personal items within reach;clutter free environment maintained;lighting adjusted;room near unit station;room organization consistent   Optimize Balance and Safe Activity   Safety Promotion/Fall Prevention bed alarm refused;bed alarm set;assistive device/personal item within reach;Fall Risk reviewed with patient/family;lighting adjusted;medications reviewed;nonskid shoes/socks when out of bed;room near unit station;instructed to call staff for mobility

## 2019-08-09 NOTE — PROGRESS NOTES
"Recurrent Seizure (Adult)    You have had another seizure today. A common cause of seizures that keep happening (recurrent seizures) is missing doses of seizure medicine. But sometimes seizures are hard to control even when you take the medicine correctly. If this is the case for you, your healthcare provider may need to increase your dosage. Or you may need to add or change to another medicine.  Home care  Follow these tips when caring for yourself at home. For this seizure:  · Seizures arent predictable. So avoid doing anything that might cause danger to you or other people if you have another seizure. Until the seizures are under good control, take these precautions:  ? Dont drive, ride a motorcycle, or ride a bike.  ? Dont operate dangerous equipment such as power tools  ? Take showers instead of baths.  ? Dont swim or climb ladders, trees, or roofs.  · Tell your close friends and relatives about your seizure. Teach them what to do for you if it happens again.  · If medicine was prescribed to prevent seizures, take it exactly as directed. It does not work when taken "as needed." Missing doses will increase the risk of having another seizure.  · If you miss a dose, take the missed dose as soon as you remember. If it is almost time for your next dose, skip the missed dose. Restart the medicine at your next scheduled time. Dont take extra medicine to make up the missed dose.  · Wear a "Medic-Alert" bracelet to let emergency personnel know about your condition.  · Follow a regular sleep schedule such that you get at least 6 to 8 hours of restful sleep every night. This is especially important when you are sick with a cold or flu and/or another type of infection.  For future seizures, if you are alone:  If you feel a seizure coming on, lie down on a bed or on the floor with something soft under your head. Lie on your left side, not on your back. This will keep you from falling. It will also let fluid drain out " of your mouth and prevent choking. Be sure you are clear of any objects that might injure you during the seizure. Call for help if there is time.  For future seizures, if someone is with you:  The person should help you get into a safe position and call for help. The person shouldnt try to force anything in your mouth once the seizure begins. This could harm your teeth or jaw.  Follow-up care  Follow up with your healthcare provider. Keep a seizure calendar to record how often you have a seizure. If you are being started on anti-seizure medicine, make sure that you use additional birth control. Seizure medicine can affect how well birth control pills work, and you could become pregnant. Avoid alcohol until your doctor tells you its OK.  Note: For the safety of yourself and others on the road, certain states require that the treating doctor tell the Public Health Department about any adult who is treated for a seizure and is at risk of more seizures. In this case, the Department of Motor Vehicles will be told. A restriction will be put on your s license until a doctor gives you medical clearance to drive again. Contact your treating doctor to find out if your state requires the reporting of patients with a seizures condition.  When to seek medical advice  Call your healthcare provider right away if any of these occur:  · Seizures happen more often or last longer than usual  · A seizure lasts over 5 minutes  · You dont wake up between seizures  · Confusion that lasts more than 30 minutes after a seizure  · Injury during a seizure  · Fever over 100.4ºF (38.0ºC), or as advised  · Unusual irritability, drowsiness, or confusion  · Stiff or painful neck  · Headache that gets worse

## 2019-08-09 NOTE — NURSING
Discharge instructions given to patient at bedside. Patient  verbalized understanding and states willingness to comply. Saline lock removed. Tele monitoring removed. Educated and stressed importance of taking medications as directed.

## 2019-08-09 NOTE — PLAN OF CARE
Problem: Fall Injury Risk  Goal: Absence of Fall and Fall-Related Injury  Outcome: Ongoing (interventions implemented as appropriate)  Intervention: Identify and Manage Contributors to Fall Injury Risk     08/09/19 0556   Manage Acute Allergic Reaction   Medication Review/Management medications reviewed   Identify and Manage Contributors to Fall Injury Risk   Self-Care Promotion independence encouraged;BADL personal routines maintained;BADL personal objects within reach     Intervention: Promote Injury-Free Environment     08/09/19 0556   Optimize [place] and Functional Mobility   Environmental Safety Modification assistive device/personal items within reach;clutter free environment maintained;room near unit station;mobility aid in reach;mattress on floor;lighting adjusted;room organization consistent   Optimize Balance and Safe Activity   Safety Promotion/Fall Prevention assistive device/personal item within reach;bed alarm set;side rails raised x 2;instructed to call staff for mobility;nonskid shoes/socks when out of bed;commode/urinal/bedpan at bedside

## 2019-08-09 NOTE — PLAN OF CARE
Problem: Hemodynamic Instability (Hemodialysis)  Goal: Vital Signs Remain in Desired Range  Outcome: Ongoing (interventions implemented as appropriate)  Hemodialysis x 3 hours with 2050 ml volume removal. 1 unit PRBC transfused. Tolerated well, VSS throughout treatment.

## 2019-08-09 NOTE — PROGRESS NOTES
WRITTEN HEALTHCARE DISCHARGE INFORMATION      Things that YOU are RESPONSIBLE for to Manage Your Care At Home:     1. Getting your prescriptions filled.  2. Taking you medications as directed. DO NOT MISS ANY DOSES!  3. Going to your follow-up doctor appointments. This is important because it allows the doctor to monitor your progress and to determine if any changes need to be made to your treatment plan.     If you are unable to make your follow up appointments, please call the number listed and reschedule this appointment.      ____________HELP AT HOME____________________     Experiencing any SIGNS or SYMPTOMS: YOU CAN     Schedule a same day appopintment with your Primary Care Doctor or  you can call [facility] On Call Nurse Care Line for 24/7 assistance at [phone]     If you are experience any signs or symptoms that have become severe, Call 911 and come to your nearest Emergency Room.     Thank you for choosing [facility] and allowing us to care for you.   From your care management team:      You should receive a call from [facility] Discharge Department within 48-72 hours to help manage your care after discharge. Please try to make sure that you answer your phone for this important phone call.    Follow-up Information     Breanne Veras MD On 8/29/2019.    Specialty:  Internal Medicine  Why:  Please keep appointment scheduled prior to coming to hospital for Thursday August 29th   Contact information:  [address] [place] [address]  [facility] [address]  [phone]             [facility] .    Why:  please report to normally scheduled dialysis unit on scheduled days Tuesday/Thursday/Saturday
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
## 2019-08-09 NOTE — NURSING
21:29 Received report from TERESSA Rush. Patient finished dialysis and on the way back to the unit.      21:50 Patient arrived from Dialysis. Alert and oriented. Patient eat his cereals on arrival. No pain. No sign of distress. IV line intact over Rt hand and rt FA  - saline locked. Patient difficult to understand most of the time. Walked to the toilet, a bit unsteady - instructed to call every time he want to get out of bed. Bed alarm on. Bed on lowest position. Safety maintained.

## 2019-08-09 NOTE — PLAN OF CARE
08/09/19 1236   Discharge Assessment   Assessment Type Discharge Planning Assessment   Assessment information obtained from? Medical Record   Prior to hospitilization cognitive status: Alert/Oriented   Prior to hospitalization functional status: Independent   Current cognitive status: Alert/Oriented   Current Functional Status: Independent   [facility]   Lives With other (see comments)  ([facility])   Able to Return to Prior Arrangements yes   Is patient able to care for self after discharge? Yes   Who are your caregiver(s) and their phone number(s)? Jayme- [phone]   Patient's perception of discharge disposition other (comments)  ([facility])   Readmission Within the Last 30 Days unable to assess  (pt recently at [facility] for same diagnosis)   Patient currently being followed by outpatient case management? No   Patient currently receives any other outside agency services? No   Equipment Currently Used at Home none   Do you have any problems affording any of your prescribed medications? No   Is the patient taking medications as prescribed?   (compliance is questioned)   Does the patient have transportation home?   (has family but unsure if will come get him)   Does the patient receive services at the [facility]? No   Discharge Plan A Other  (return to [facility])   DME Needed Upon Discharge  none   Patient/Family in Agreement with Plan yes     [id number] - [address] [address]  Phone: [phone] Fax: [phone]    [facility] - [address]  [address] [place] [address]  Phone: [phone] Fax: [phone]
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
## 2019-08-12 LAB — LEVETIRACETAM SERPL-MCNC: 8.9 UG/ML (ref 3–60)

## 2021-02-07 PROBLEM — F32.9 MAJOR DEPRESSIVE DISORDER: Status: ACTIVE | Noted: 2021-02-07

## 2021-02-16 PROBLEM — Z99.2 ESRD ON HEMODIALYSIS: Status: ACTIVE | Noted: 2017-06-14

## 2021-02-16 PROBLEM — B20 HIV (HUMAN IMMUNODEFICIENCY VIRUS INFECTION): Status: ACTIVE | Noted: 2017-06-29

## 2021-02-16 PROBLEM — N25.81 SECONDARY HYPERPARATHYROIDISM OF RENAL ORIGIN: Status: ACTIVE | Noted: 2021-02-16

## 2021-02-16 PROBLEM — K92.2 UGIB (UPPER GASTROINTESTINAL BLEED): Status: ACTIVE | Noted: 2017-08-10

## 2021-02-16 PROBLEM — I50.22 CHRONIC SYSTOLIC HEART FAILURE: Status: ACTIVE | Noted: 2019-09-13

## 2021-02-16 PROBLEM — Z78.9 IMMUNE TO HEPATITIS B: Status: ACTIVE | Noted: 2017-10-12

## 2021-02-16 PROBLEM — Z86.19 HISTORY OF SYPHILIS: Status: ACTIVE | Noted: 2021-02-16

## 2021-02-16 PROBLEM — R93.3 ABNORMAL COLONOSCOPY: Status: ACTIVE | Noted: 2019-09-30

## 2021-02-16 PROBLEM — N18.6 ESRD ON HEMODIALYSIS: Status: ACTIVE | Noted: 2017-06-14

## 2021-06-25 ENCOUNTER — HOSPITAL ENCOUNTER (INPATIENT)
Facility: HOSPITAL | Age: 56
LOS: 2 days | Discharge: HOME | DRG: 640 | End: 2021-06-27
Attending: EMERGENCY MEDICINE | Admitting: HOSPITALIST
Payer: MEDICAID

## 2021-06-25 DIAGNOSIS — B20 HIV INFECTION, UNSPECIFIED SYMPTOM STATUS: ICD-10-CM

## 2021-06-25 DIAGNOSIS — R09.02 HYPOXIA: ICD-10-CM

## 2021-06-25 DIAGNOSIS — R07.9 CHEST PAIN: ICD-10-CM

## 2021-06-25 DIAGNOSIS — K62.6 ULCER OF ANUS: ICD-10-CM

## 2021-06-25 DIAGNOSIS — R19.7 DIARRHEA, UNSPECIFIED TYPE: ICD-10-CM

## 2021-06-25 DIAGNOSIS — N18.6 END STAGE RENAL DISEASE: ICD-10-CM

## 2021-06-25 DIAGNOSIS — E87.70 VOLUME OVERLOAD: ICD-10-CM

## 2021-06-25 DIAGNOSIS — E87.70 HYPERVOLEMIA, UNSPECIFIED HYPERVOLEMIA TYPE: Primary | ICD-10-CM

## 2021-06-25 DIAGNOSIS — R06.02 SOB (SHORTNESS OF BREATH): ICD-10-CM

## 2021-06-25 LAB
ABO + RH BLD: NORMAL
ALBUMIN SERPL BCP-MCNC: 3 G/DL (ref 3.5–5.2)
ALP SERPL-CCNC: 142 U/L (ref 55–135)
ALT SERPL W/O P-5'-P-CCNC: 12 U/L (ref 10–44)
ANION GAP SERPL CALC-SCNC: 13 MMOL/L (ref 8–16)
AST SERPL-CCNC: 22 U/L (ref 10–40)
BASOPHILS # BLD AUTO: 0.02 K/UL (ref 0–0.2)
BASOPHILS NFR BLD: 0.5 % (ref 0–1.9)
BILIRUB SERPL-MCNC: 1 MG/DL (ref 0.1–1)
BLD GP AB SCN CELLS X3 SERPL QL: NORMAL
BNP SERPL-MCNC: >4900 PG/ML (ref 0–99)
BUN SERPL-MCNC: 65 MG/DL (ref 6–20)
CALCIUM SERPL-MCNC: 9 MG/DL (ref 8.7–10.5)
CHLORIDE SERPL-SCNC: 106 MMOL/L (ref 95–110)
CO2 SERPL-SCNC: 25 MMOL/L (ref 23–29)
CREAT SERPL-MCNC: 14 MG/DL (ref 0.5–1.4)
CTP QC/QA: YES
DIFFERENTIAL METHOD: ABNORMAL
EOSINOPHIL # BLD AUTO: 0.2 K/UL (ref 0–0.5)
EOSINOPHIL NFR BLD: 5.4 % (ref 0–8)
ERYTHROCYTE [DISTWIDTH] IN BLOOD BY AUTOMATED COUNT: 20.1 % (ref 11.5–14.5)
EST. GFR  (AFRICAN AMERICAN): 4 ML/MIN/1.73 M^2
EST. GFR  (NON AFRICAN AMERICAN): 4 ML/MIN/1.73 M^2
GLUCOSE SERPL-MCNC: 93 MG/DL (ref 70–110)
HCT VFR BLD AUTO: 32.1 % (ref 40–54)
HGB BLD-MCNC: 9.9 G/DL (ref 14–18)
IMM GRANULOCYTES # BLD AUTO: 0.01 K/UL (ref 0–0.04)
IMM GRANULOCYTES NFR BLD AUTO: 0.2 % (ref 0–0.5)
LYMPHOCYTES # BLD AUTO: 1.1 K/UL (ref 1–4.8)
LYMPHOCYTES NFR BLD: 26.1 % (ref 18–48)
MAGNESIUM SERPL-MCNC: 2.7 MG/DL (ref 1.6–2.6)
MCH RBC QN AUTO: 28.4 PG (ref 27–31)
MCHC RBC AUTO-ENTMCNC: 30.8 G/DL (ref 32–36)
MCV RBC AUTO: 92 FL (ref 82–98)
MONOCYTES # BLD AUTO: 0.7 K/UL (ref 0.3–1)
MONOCYTES NFR BLD: 16.2 % (ref 4–15)
NEUTROPHILS # BLD AUTO: 2.2 K/UL (ref 1.8–7.7)
NEUTROPHILS NFR BLD: 51.6 % (ref 38–73)
NRBC BLD-RTO: 0 /100 WBC
PHOSPHATE SERPL-MCNC: 6 MG/DL (ref 2.7–4.5)
PLATELET # BLD AUTO: 159 K/UL (ref 150–450)
PMV BLD AUTO: 10.1 FL (ref 9.2–12.9)
POTASSIUM SERPL-SCNC: 4.5 MMOL/L (ref 3.5–5.1)
PROT SERPL-MCNC: 8.6 G/DL (ref 6–8.4)
RBC # BLD AUTO: 3.49 M/UL (ref 4.6–6.2)
SARS-COV-2 RDRP RESP QL NAA+PROBE: NEGATIVE
SODIUM SERPL-SCNC: 144 MMOL/L (ref 136–145)
TROPONIN I SERPL DL<=0.01 NG/ML-MCNC: 0.06 NG/ML (ref 0–0.03)
WBC # BLD AUTO: 4.25 K/UL (ref 3.9–12.7)

## 2021-06-25 PROCEDURE — 83735 ASSAY OF MAGNESIUM: CPT | Performed by: EMERGENCY MEDICINE

## 2021-06-25 PROCEDURE — 86592 SYPHILIS TEST NON-TREP QUAL: CPT | Performed by: EMERGENCY MEDICINE

## 2021-06-25 PROCEDURE — 12000002 HC ACUTE/MED SURGE SEMI-PRIVATE ROOM

## 2021-06-25 PROCEDURE — 84484 ASSAY OF TROPONIN QUANT: CPT | Performed by: EMERGENCY MEDICINE

## 2021-06-25 PROCEDURE — 83880 ASSAY OF NATRIURETIC PEPTIDE: CPT | Performed by: EMERGENCY MEDICINE

## 2021-06-25 PROCEDURE — 25000003 PHARM REV CODE 250: Performed by: EMERGENCY MEDICINE

## 2021-06-25 PROCEDURE — 84100 ASSAY OF PHOSPHORUS: CPT | Performed by: EMERGENCY MEDICINE

## 2021-06-25 PROCEDURE — 99285 EMERGENCY DEPT VISIT HI MDM: CPT | Mod: 25

## 2021-06-25 PROCEDURE — 85025 COMPLETE CBC W/AUTO DIFF WBC: CPT | Performed by: EMERGENCY MEDICINE

## 2021-06-25 PROCEDURE — 93010 ELECTROCARDIOGRAM REPORT: CPT | Mod: ,,, | Performed by: INTERNAL MEDICINE

## 2021-06-25 PROCEDURE — U0002 COVID-19 LAB TEST NON-CDC: HCPCS | Performed by: EMERGENCY MEDICINE

## 2021-06-25 PROCEDURE — 93005 ELECTROCARDIOGRAM TRACING: CPT

## 2021-06-25 PROCEDURE — 86900 BLOOD TYPING SEROLOGIC ABO: CPT | Performed by: EMERGENCY MEDICINE

## 2021-06-25 PROCEDURE — 80053 COMPREHEN METABOLIC PANEL: CPT | Performed by: EMERGENCY MEDICINE

## 2021-06-25 PROCEDURE — 93010 EKG 12-LEAD: ICD-10-PCS | Mod: ,,, | Performed by: INTERNAL MEDICINE

## 2021-06-25 PROCEDURE — 25500020 PHARM REV CODE 255: Performed by: EMERGENCY MEDICINE

## 2021-06-25 RX ORDER — DOLUTEGRAVIR SODIUM 50 MG/1
50 TABLET, FILM COATED ORAL
COMMUNITY
End: 2021-11-17 | Stop reason: SDUPTHER

## 2021-06-25 RX ORDER — DARUNAVIR ETHANOLATE AND COBICISTAT 800; 150 MG/1; MG/1
1 TABLET, FILM COATED ORAL
COMMUNITY
End: 2021-11-17 | Stop reason: SDUPTHER

## 2021-06-25 RX ORDER — DICYCLOMINE HYDROCHLORIDE 10 MG/1
10 CAPSULE ORAL
COMMUNITY

## 2021-06-25 RX ORDER — HYDROCODONE BITARTRATE AND ACETAMINOPHEN 5; 325 MG/1; MG/1
1 TABLET ORAL
Status: COMPLETED | OUTPATIENT
Start: 2021-06-25 | End: 2021-06-25

## 2021-06-25 RX ADMIN — IOHEXOL 75 ML: 350 INJECTION, SOLUTION INTRAVENOUS at 08:06

## 2021-06-25 RX ADMIN — HYDROCODONE BITARTRATE AND ACETAMINOPHEN 1 TABLET: 5; 325 TABLET ORAL at 10:06

## 2021-06-26 PROBLEM — J96.01 ACUTE RESPIRATORY FAILURE WITH HYPOXIA: Status: ACTIVE | Noted: 2021-06-26

## 2021-06-26 PROBLEM — R19.7 DIARRHEA: Status: ACTIVE | Noted: 2021-06-26

## 2021-06-26 PROBLEM — E87.70 HYPERVOLEMIA: Status: ACTIVE | Noted: 2021-06-26

## 2021-06-26 PROCEDURE — 25000003 PHARM REV CODE 250: Performed by: INTERNAL MEDICINE

## 2021-06-26 PROCEDURE — 63600175 PHARM REV CODE 636 W HCPCS: Performed by: INTERNAL MEDICINE

## 2021-06-26 PROCEDURE — 90935 HEMODIALYSIS ONE EVALUATION: CPT

## 2021-06-26 PROCEDURE — 21400001 HC TELEMETRY ROOM

## 2021-06-26 RX ORDER — SODIUM CHLORIDE 0.9 % (FLUSH) 0.9 %
10 SYRINGE (ML) INJECTION
Status: DISCONTINUED | OUTPATIENT
Start: 2021-06-26 | End: 2021-06-27 | Stop reason: HOSPADM

## 2021-06-26 RX ORDER — ONDANSETRON 2 MG/ML
4 INJECTION INTRAMUSCULAR; INTRAVENOUS EVERY 8 HOURS PRN
Status: DISCONTINUED | OUTPATIENT
Start: 2021-06-26 | End: 2021-06-27 | Stop reason: HOSPADM

## 2021-06-26 RX ORDER — DICYCLOMINE HYDROCHLORIDE 10 MG/1
10 CAPSULE ORAL
Status: DISCONTINUED | OUTPATIENT
Start: 2021-06-26 | End: 2021-06-27 | Stop reason: HOSPADM

## 2021-06-26 RX ORDER — PROMETHAZINE HYDROCHLORIDE 25 MG/1
25 TABLET ORAL EVERY 6 HOURS PRN
Status: DISCONTINUED | OUTPATIENT
Start: 2021-06-26 | End: 2021-06-27 | Stop reason: HOSPADM

## 2021-06-26 RX ORDER — IBUPROFEN 200 MG
16 TABLET ORAL
Status: DISCONTINUED | OUTPATIENT
Start: 2021-06-26 | End: 2021-06-27 | Stop reason: HOSPADM

## 2021-06-26 RX ORDER — TALC
6 POWDER (GRAM) TOPICAL NIGHTLY PRN
Status: DISCONTINUED | OUTPATIENT
Start: 2021-06-26 | End: 2021-06-27 | Stop reason: HOSPADM

## 2021-06-26 RX ORDER — SEVELAMER CARBONATE 800 MG/1
1600 TABLET, FILM COATED ORAL
Status: DISCONTINUED | OUTPATIENT
Start: 2021-06-26 | End: 2021-06-27 | Stop reason: HOSPADM

## 2021-06-26 RX ORDER — LEVETIRACETAM 500 MG/1
500 TABLET ORAL 2 TIMES DAILY
Status: DISCONTINUED | OUTPATIENT
Start: 2021-06-26 | End: 2021-06-27 | Stop reason: HOSPADM

## 2021-06-26 RX ORDER — PANTOPRAZOLE SODIUM 40 MG/1
40 TABLET, DELAYED RELEASE ORAL DAILY
Status: DISCONTINUED | OUTPATIENT
Start: 2021-06-26 | End: 2021-06-27 | Stop reason: HOSPADM

## 2021-06-26 RX ORDER — IBUPROFEN 200 MG
24 TABLET ORAL
Status: DISCONTINUED | OUTPATIENT
Start: 2021-06-26 | End: 2021-06-27 | Stop reason: HOSPADM

## 2021-06-26 RX ORDER — HEPARIN SODIUM 5000 [USP'U]/ML
5000 INJECTION, SOLUTION INTRAVENOUS; SUBCUTANEOUS
Status: DISCONTINUED | OUTPATIENT
Start: 2021-06-26 | End: 2021-06-27 | Stop reason: HOSPADM

## 2021-06-26 RX ORDER — FLUOXETINE HYDROCHLORIDE 20 MG/1
40 CAPSULE ORAL DAILY
Status: DISCONTINUED | OUTPATIENT
Start: 2021-06-26 | End: 2021-06-27 | Stop reason: HOSPADM

## 2021-06-26 RX ORDER — GLUCAGON 1 MG
1 KIT INJECTION
Status: DISCONTINUED | OUTPATIENT
Start: 2021-06-26 | End: 2021-06-27 | Stop reason: HOSPADM

## 2021-06-26 RX ORDER — AMLODIPINE BESYLATE 5 MG/1
10 TABLET ORAL DAILY
Status: DISCONTINUED | OUTPATIENT
Start: 2021-06-26 | End: 2021-06-27 | Stop reason: HOSPADM

## 2021-06-26 RX ORDER — SULFAMETHOXAZOLE AND TRIMETHOPRIM 200; 40 MG/5ML; MG/5ML
10 SUSPENSION ORAL
Status: DISCONTINUED | OUTPATIENT
Start: 2021-06-28 | End: 2021-06-27 | Stop reason: HOSPADM

## 2021-06-26 RX ORDER — HEPARIN SODIUM 5000 [USP'U]/ML
5000 INJECTION, SOLUTION INTRAVENOUS; SUBCUTANEOUS EVERY 8 HOURS
Status: DISCONTINUED | OUTPATIENT
Start: 2021-06-26 | End: 2021-06-27 | Stop reason: HOSPADM

## 2021-06-26 RX ORDER — CARVEDILOL 3.12 MG/1
3.12 TABLET ORAL 2 TIMES DAILY WITH MEALS
Status: DISCONTINUED | OUTPATIENT
Start: 2021-06-26 | End: 2021-06-27 | Stop reason: HOSPADM

## 2021-06-26 RX ORDER — TRAZODONE HYDROCHLORIDE 50 MG/1
50 TABLET ORAL NIGHTLY
Status: DISCONTINUED | OUTPATIENT
Start: 2021-06-26 | End: 2021-06-27 | Stop reason: HOSPADM

## 2021-06-26 RX ORDER — ACETAMINOPHEN 325 MG/1
650 TABLET ORAL EVERY 4 HOURS PRN
Status: DISCONTINUED | OUTPATIENT
Start: 2021-06-26 | End: 2021-06-27 | Stop reason: HOSPADM

## 2021-06-26 RX ORDER — IPRATROPIUM BROMIDE AND ALBUTEROL SULFATE 2.5; .5 MG/3ML; MG/3ML
3 SOLUTION RESPIRATORY (INHALATION) EVERY 4 HOURS PRN
Status: DISCONTINUED | OUTPATIENT
Start: 2021-06-26 | End: 2021-06-27 | Stop reason: HOSPADM

## 2021-06-26 RX ADMIN — CARVEDILOL 3.12 MG: 3.12 TABLET, FILM COATED ORAL at 09:06

## 2021-06-26 RX ADMIN — AMLODIPINE BESYLATE 10 MG: 5 TABLET ORAL at 09:06

## 2021-06-26 RX ADMIN — DICYCLOMINE HYDROCHLORIDE 10 MG: 10 CAPSULE ORAL at 09:06

## 2021-06-26 RX ADMIN — TRAZODONE HYDROCHLORIDE 50 MG: 50 TABLET ORAL at 08:06

## 2021-06-26 RX ADMIN — DICYCLOMINE HYDROCHLORIDE 10 MG: 10 CAPSULE ORAL at 08:06

## 2021-06-26 RX ADMIN — HEPARIN SODIUM 5000 UNITS: 5000 INJECTION INTRAVENOUS; SUBCUTANEOUS at 03:06

## 2021-06-26 RX ADMIN — DOLUTEGRAVIR SODIUM 50 MG: 50 TABLET, FILM COATED ORAL at 09:06

## 2021-06-26 RX ADMIN — PANTOPRAZOLE SODIUM 40 MG: 40 TABLET, DELAYED RELEASE ORAL at 09:06

## 2021-06-26 RX ADMIN — SEVELAMER CARBONATE 1600 MG: 800 TABLET, FILM COATED ORAL at 04:06

## 2021-06-26 RX ADMIN — LEVETIRACETAM 500 MG: 500 TABLET, FILM COATED ORAL at 09:06

## 2021-06-26 RX ADMIN — CARVEDILOL 3.12 MG: 3.12 TABLET, FILM COATED ORAL at 05:06

## 2021-06-26 RX ADMIN — FLUOXETINE 40 MG: 20 CAPSULE ORAL at 09:06

## 2021-06-26 RX ADMIN — DICYCLOMINE HYDROCHLORIDE 10 MG: 10 CAPSULE ORAL at 04:06

## 2021-06-26 RX ADMIN — NEPHROCAP 1 CAPSULE: 1 CAP ORAL at 09:06

## 2021-06-26 RX ADMIN — HEPARIN SODIUM 5000 UNITS: 5000 INJECTION INTRAVENOUS; SUBCUTANEOUS at 07:06

## 2021-06-26 RX ADMIN — LEVETIRACETAM 500 MG: 500 TABLET, FILM COATED ORAL at 08:06

## 2021-06-27 VITALS
TEMPERATURE: 97 F | HEART RATE: 68 BPM | SYSTOLIC BLOOD PRESSURE: 133 MMHG | OXYGEN SATURATION: 93 % | BODY MASS INDEX: 27.1 KG/M2 | DIASTOLIC BLOOD PRESSURE: 66 MMHG | HEIGHT: 66 IN | WEIGHT: 168.63 LBS | RESPIRATION RATE: 17 BRPM

## 2021-06-27 PROBLEM — E87.70 VOLUME OVERLOAD: Status: RESOLVED | Noted: 2021-06-26 | Resolved: 2021-06-27

## 2021-06-27 PROBLEM — J96.01 ACUTE RESPIRATORY FAILURE WITH HYPOXIA: Status: RESOLVED | Noted: 2021-06-26 | Resolved: 2021-06-27

## 2021-06-27 PROBLEM — R19.7 DIARRHEA: Status: RESOLVED | Noted: 2021-06-26 | Resolved: 2021-06-27

## 2021-06-27 LAB
AORTIC ROOT ANNULUS: 2.67 CM
AORTIC VALVE CUSP SEPERATION: 2.19 CM
ASCENDING AORTA: 2.96 CM
AV INDEX (PROSTH): 0.57
AV MEAN GRADIENT: 6 MMHG
AV PEAK GRADIENT: 12 MMHG
AV VALVE AREA: 1.86 CM2
AV VELOCITY RATIO: 0.58
BSA FOR ECHO PROCEDURE: 1.96 M2
CV ECHO LV RWT: 0.57 CM
DOP CALC AO PEAK VEL: 1.74 M/S
DOP CALC AO VTI: 36.4 CM
DOP CALC LVOT AREA: 3.2 CM2
DOP CALC LVOT DIAMETER: 2.03 CM
DOP CALC LVOT PEAK VEL: 1.01 M/S
DOP CALC LVOT STROKE VOLUME: 67.54 CM3
DOP CALCLVOT PEAK VEL VTI: 20.88 CM
E WAVE DECELERATION TIME: 161.61 MSEC
E/A RATIO: 1.24
ECHO LV POSTERIOR WALL: 1.36 CM (ref 0.6–1.1)
EJECTION FRACTION: 45 %
FRACTIONAL SHORTENING: 22 % (ref 28–44)
INTERVENTRICULAR SEPTUM: 1.65 CM (ref 0.6–1.1)
IVRT: 59.98 MSEC
LA MAJOR: 6.03 CM
LA MINOR: 6.14 CM
LA WIDTH: 5.46 CM
LEFT ATRIUM SIZE: 4.58 CM
LEFT ATRIUM VOLUME INDEX: 69.5 ML/M2
LEFT ATRIUM VOLUME: 129.33 CM3
LEFT INTERNAL DIMENSION IN SYSTOLE: 3.72 CM (ref 2.1–4)
LEFT VENTRICLE DIASTOLIC VOLUME INDEX: 56.87 ML/M2
LEFT VENTRICLE DIASTOLIC VOLUME: 105.78 ML
LEFT VENTRICLE MASS INDEX: 162 G/M2
LEFT VENTRICLE SYSTOLIC VOLUME INDEX: 31.7 ML/M2
LEFT VENTRICLE SYSTOLIC VOLUME: 58.96 ML
LEFT VENTRICULAR INTERNAL DIMENSION IN DIASTOLE: 4.77 CM (ref 3.5–6)
LEFT VENTRICULAR MASS: 302.1 G
MV PEAK A VEL: 1.02 M/S
MV PEAK E VEL: 1.26 M/S
MV STENOSIS PRESSURE HALF TIME: 46.87 MS
MV VALVE AREA P 1/2 METHOD: 4.69 CM2
PISA MRMAX VEL: 0.04 M/S
PISA TR MAX VEL: 2.17 M/S
PV PEAK VELOCITY: 0.76 CM/S
RA MAJOR: 5.6 CM
RA PRESSURE: 15 MMHG
RA WIDTH: 5.5 CM
RIGHT VENTRICULAR END-DIASTOLIC DIMENSION: 4.61 CM
SINUS: 3.53 CM
STJ: 2.57 CM
TR MAX PG: 19 MMHG
TV REST PULMONARY ARTERY PRESSURE: 34 MMHG

## 2021-06-27 PROCEDURE — 25000003 PHARM REV CODE 250: Performed by: INTERNAL MEDICINE

## 2021-06-27 PROCEDURE — 94761 N-INVAS EAR/PLS OXIMETRY MLT: CPT

## 2021-06-27 PROCEDURE — 99900035 HC TECH TIME PER 15 MIN (STAT)

## 2021-06-27 RX ADMIN — CARVEDILOL 3.12 MG: 3.12 TABLET, FILM COATED ORAL at 08:06

## 2021-06-27 RX ADMIN — DOLUTEGRAVIR SODIUM 50 MG: 50 TABLET, FILM COATED ORAL at 08:06

## 2021-06-27 RX ADMIN — SEVELAMER CARBONATE 1600 MG: 800 TABLET, FILM COATED ORAL at 08:06

## 2021-06-27 RX ADMIN — AMLODIPINE BESYLATE 10 MG: 5 TABLET ORAL at 08:06

## 2021-06-27 RX ADMIN — NEPHROCAP 1 CAPSULE: 1 CAP ORAL at 08:06

## 2021-06-27 RX ADMIN — PANTOPRAZOLE SODIUM 40 MG: 40 TABLET, DELAYED RELEASE ORAL at 08:06

## 2021-06-27 RX ADMIN — LEVETIRACETAM 500 MG: 500 TABLET, FILM COATED ORAL at 08:06

## 2021-06-27 RX ADMIN — FLUOXETINE 40 MG: 20 CAPSULE ORAL at 08:06

## 2021-06-28 LAB — RPR SER QL: NORMAL

## 2021-11-11 PROBLEM — R41.9 NEUROCOGNITIVE DISORDER: Status: ACTIVE | Noted: 2021-11-11

## 2021-12-08 PROBLEM — F14.11 HX OF COCAINE ABUSE: Status: ACTIVE | Noted: 2021-12-08

## 2021-12-08 PROBLEM — I07.1 TRICUSPID REGURGITATION: Status: ACTIVE | Noted: 2021-12-08

## 2021-12-08 PROBLEM — R18.8 ASCITES: Status: ACTIVE | Noted: 2021-12-08

## 2021-12-08 PROBLEM — I27.20 PULMONARY HTN: Status: ACTIVE | Noted: 2021-12-08

## 2021-12-08 PROBLEM — F10.10 ALCOHOL ABUSE: Status: ACTIVE | Noted: 2021-12-08

## 2021-12-08 PROBLEM — I34.0 MITRAL REGURGITATION: Status: ACTIVE | Noted: 2021-12-08

## 2023-03-01 NOTE — ED TRIAGE NOTES
Pt presents with c/o R arm pain post-op. Pt had fistula placed Thursday. Pt states his hands/fingers are tingling and numb. Pt states his arm feels swollen and painful. Swelling noted in the pt's hands and fingers. No discoloration. Pt had dialysis Monday and was due today. Pt AAOx4, RR even and unlabored, NAD noted.    no
